# Patient Record
Sex: FEMALE | Race: WHITE | Employment: OTHER | URBAN - METROPOLITAN AREA
[De-identification: names, ages, dates, MRNs, and addresses within clinical notes are randomized per-mention and may not be internally consistent; named-entity substitution may affect disease eponyms.]

---

## 2017-06-21 ENCOUNTER — HOSPITAL ENCOUNTER (OUTPATIENT)
Dept: CT IMAGING | Age: 57
Discharge: HOME | End: 2017-06-21
Attending: FAMILY MEDICINE
Payer: COMMERCIAL

## 2017-06-21 DIAGNOSIS — R22.1 NECK MASS: ICD-10-CM

## 2017-06-21 PROCEDURE — 74011636320 HC RX REV CODE- 636/320: Performed by: FAMILY MEDICINE

## 2017-06-21 PROCEDURE — 70491 CT SOFT TISSUE NECK W/DYE: CPT

## 2017-06-21 RX ADMIN — IOPAMIDOL 83 ML: 612 INJECTION, SOLUTION INTRAVENOUS at 14:08

## 2018-09-05 ENCOUNTER — HOSPITAL ENCOUNTER (EMERGENCY)
Age: 58
Discharge: HOME | End: 2018-09-05
Attending: EMERGENCY MEDICINE
Payer: COMMERCIAL

## 2018-09-05 ENCOUNTER — APPOINTMENT (OUTPATIENT)
Dept: CT IMAGING | Age: 58
End: 2018-09-05
Attending: EMERGENCY MEDICINE
Payer: COMMERCIAL

## 2018-09-05 VITALS
OXYGEN SATURATION: 98 % | BODY MASS INDEX: 42.49 KG/M2 | WEIGHT: 255 LBS | DIASTOLIC BLOOD PRESSURE: 93 MMHG | SYSTOLIC BLOOD PRESSURE: 151 MMHG | RESPIRATION RATE: 18 BRPM | TEMPERATURE: 97.3 F | HEIGHT: 65 IN | HEART RATE: 75 BPM

## 2018-09-05 DIAGNOSIS — R51.9 ACUTE NONINTRACTABLE HEADACHE, UNSPECIFIED HEADACHE TYPE: Primary | ICD-10-CM

## 2018-09-05 LAB
ALBUMIN SERPL-MCNC: 4.4 G/DL (ref 3.4–5)
ALBUMIN/GLOB SERPL: 1.2 {RATIO} (ref 0.8–1.7)
ALP SERPL-CCNC: 135 U/L (ref 45–117)
ALT SERPL-CCNC: 32 U/L (ref 13–56)
ANION GAP SERPL CALC-SCNC: 11 MMOL/L (ref 3–18)
AST SERPL-CCNC: 26 U/L (ref 15–37)
BASOPHILS # BLD: 0 K/UL (ref 0–0.1)
BASOPHILS NFR BLD: 0 % (ref 0–2)
BILIRUB SERPL-MCNC: 0.6 MG/DL (ref 0.2–1)
BUN SERPL-MCNC: 21 MG/DL (ref 7–18)
BUN/CREAT SERPL: 21 (ref 12–20)
CALCIUM SERPL-MCNC: 10 MG/DL (ref 8.5–10.1)
CHLORIDE SERPL-SCNC: 104 MMOL/L (ref 100–108)
CO2 SERPL-SCNC: 26 MMOL/L (ref 21–32)
CREAT SERPL-MCNC: 1.02 MG/DL (ref 0.6–1.3)
DIFFERENTIAL METHOD BLD: ABNORMAL
EOSINOPHIL # BLD: 0.1 K/UL (ref 0–0.4)
EOSINOPHIL NFR BLD: 0 % (ref 0–5)
ERYTHROCYTE [DISTWIDTH] IN BLOOD BY AUTOMATED COUNT: 13.6 % (ref 11.6–14.5)
GLOBULIN SER CALC-MCNC: 3.7 G/DL (ref 2–4)
GLUCOSE SERPL-MCNC: 147 MG/DL (ref 74–99)
HCT VFR BLD AUTO: 47.4 % (ref 35–45)
HGB BLD-MCNC: 16.2 G/DL (ref 12–16)
LYMPHOCYTES # BLD: 5.3 K/UL (ref 0.9–3.6)
LYMPHOCYTES NFR BLD: 31 % (ref 21–52)
MCH RBC QN AUTO: 30.5 PG (ref 24–34)
MCHC RBC AUTO-ENTMCNC: 34.2 G/DL (ref 31–37)
MCV RBC AUTO: 89.3 FL (ref 74–97)
MONOCYTES # BLD: 1 K/UL (ref 0.05–1.2)
MONOCYTES NFR BLD: 6 % (ref 3–10)
NEUTS SEG # BLD: 10.7 K/UL (ref 1.8–8)
NEUTS SEG NFR BLD: 63 % (ref 40–73)
PLATELET # BLD AUTO: 356 K/UL (ref 135–420)
PMV BLD AUTO: 10.2 FL (ref 9.2–11.8)
POTASSIUM SERPL-SCNC: 3.6 MMOL/L (ref 3.5–5.5)
PROT SERPL-MCNC: 8.1 G/DL (ref 6.4–8.2)
RBC # BLD AUTO: 5.31 M/UL (ref 4.2–5.3)
SODIUM SERPL-SCNC: 141 MMOL/L (ref 136–145)
WBC # BLD AUTO: 17.1 K/UL (ref 4.6–13.2)

## 2018-09-05 PROCEDURE — 70450 CT HEAD/BRAIN W/O DYE: CPT

## 2018-09-05 PROCEDURE — 99283 EMERGENCY DEPT VISIT LOW MDM: CPT

## 2018-09-05 PROCEDURE — 96375 TX/PRO/DX INJ NEW DRUG ADDON: CPT

## 2018-09-05 PROCEDURE — 80053 COMPREHEN METABOLIC PANEL: CPT

## 2018-09-05 PROCEDURE — 85025 COMPLETE CBC W/AUTO DIFF WBC: CPT

## 2018-09-05 PROCEDURE — 74011250636 HC RX REV CODE- 250/636: Performed by: EMERGENCY MEDICINE

## 2018-09-05 PROCEDURE — 96374 THER/PROPH/DIAG INJ IV PUSH: CPT

## 2018-09-05 RX ORDER — ESCITALOPRAM OXALATE 20 MG/1
20 TABLET ORAL DAILY
COMMUNITY
End: 2018-09-18

## 2018-09-05 RX ORDER — KETOROLAC TROMETHAMINE 30 MG/ML
30 INJECTION, SOLUTION INTRAMUSCULAR; INTRAVENOUS
Status: COMPLETED | OUTPATIENT
Start: 2018-09-05 | End: 2018-09-05

## 2018-09-05 RX ORDER — PREGABALIN 50 MG/1
50 CAPSULE ORAL 3 TIMES DAILY
COMMUNITY
End: 2018-09-18

## 2018-09-05 RX ORDER — METOCLOPRAMIDE HYDROCHLORIDE 5 MG/ML
10 INJECTION INTRAMUSCULAR; INTRAVENOUS
Status: COMPLETED | OUTPATIENT
Start: 2018-09-05 | End: 2018-09-05

## 2018-09-05 RX ADMIN — METOCLOPRAMIDE 10 MG: 5 INJECTION, SOLUTION INTRAMUSCULAR; INTRAVENOUS at 12:59

## 2018-09-05 RX ADMIN — KETOROLAC TROMETHAMINE 30 MG: 30 INJECTION, SOLUTION INTRAMUSCULAR at 12:58

## 2018-09-05 RX ADMIN — SODIUM CHLORIDE 1000 ML: 900 INJECTION, SOLUTION INTRAVENOUS at 12:58

## 2018-09-05 NOTE — DISCHARGE INSTRUCTIONS

## 2018-09-05 NOTE — ED NOTES
I have reviewed discharge instructions with the patient. The patient verbalized understanding. Patient has no further questions. Patient was ambulatory upon discharge. Patient received no prescription. Patient armband removed and shredded.

## 2018-09-05 NOTE — ED PROVIDER NOTES
EMERGENCY DEPARTMENT HISTORY AND PHYSICAL EXAM 
 
1:07 PM 
 
 
Date: 9/5/2018 Patient Name: Zander West History of Presenting Illness Chief Complaint Patient presents with  
 Headache  Vomiting  Nausea History Provided By: Patient Chief Complaint: Headache Duration:  Hours Timing:  Acute Location: Left parietal 
Quality: Stabbing Severity: Severe Modifying Factors: None Associated Symptoms: N/V Additional History (Context): Zander West is a 60 y.o. female with a h/o HTN and COPD who presents to the ED via EMS complaining of a severe, acute, stabbing left parietal headache that began this morning with associated N/V. The patient explains that her HA began suddenly while sitting on the toilet. She states that she has never had a HA like this before. EMS notes that the patient reported taking her HTN medication. Patient's daughter notes that her mother has a h/o migraines. No exacerbating or alleviating factors noted. No other complaints or concerns at this time. Pt is crying and reluctant to answer questions. PCP: Cj Pagan MD 
 
Current Outpatient Prescriptions Medication Sig Dispense Refill  escitalopram oxalate (LEXAPRO) 20 mg tablet Take 20 mg by mouth daily.  pregabalin (LYRICA) 50 mg capsule Take 50 mg by mouth three (3) times daily.  HYDROcodone-acetaminophen (NORCO) 5-325 mg per tablet Take 1 Tab by mouth every eight (8) hours as needed for Pain. 10 Tab 0  
 fluticasone-salmeterol (ADVAIR DISKUS) 250-50 mcg/dose diskus inhaler Take 1 Puff by inhalation every twelve (12) hours.  omega-3 fatty acids-vitamin e (FISH OIL) 1,000 mg cap Take 1 Cap by mouth.  cyclobenzaprine (FLEXERIL) 10 mg tablet Take  by mouth three (3) times daily as needed.  losartan-hydrochlorothiazide (HYZAAR) 100-25 mg per tablet Take 1 Tab by mouth daily.  ALPRAZolam (XANAX) 0.5 mg tablet Take  by mouth.  ondansetron (ZOFRAN ODT) 8 mg disintegrating tablet Take 1 Tab by mouth every eight (8) hours as needed for Nausea. 12 Tab none Past History Past Medical History: 
Past Medical History:  
Diagnosis Date  Abdominal pain  Anxiety  COPD (chronic obstructive pulmonary disease) (HCC)  DJD (degenerative joint disease) of cervical spine  Elevated cholesterol  GERD (gastroesophageal reflux disease) H/O--NOT ON MEDICATION AT PRESENT  
 Hypertension  Nausea and vomiting Past Surgical History: 
Past Surgical History:  
Procedure Laterality Date  EXCISION TUMOR SOFT TISSUE FOOT/TOE SUBQ <1.5CM  2011  
 2 mccartney neuroma right foot  HX CARPAL TUNNEL RELEASE  2002  HX CHOLECYSTECTOMY  10/14/2013  HX HYSTERECTOMY  1994  
 vaginal (ovaries still in) [address]  CT REMOVAL 1ST/CERVICAL RIB  1984  
 right side 1st rib  REMOVAL OF RIB(S)  1976  
 left side 1st rib Family History: 
Family History Problem Relation Age of Onset  Heart Disease Mother  Hypertension Mother  Diabetes Mother  Hypertension Maternal Aunt  Diabetes Maternal Aunt  Thyroid Disease Sister Social History: 
Social History Substance Use Topics  Smoking status: Former Smoker  Smokeless tobacco: Never Used Comment: stop 4/2013; uses electronic cigarette  Alcohol use No  
 
 
Allergies: Allergies Allergen Reactions  Lisinopril Cough Review of Systems Review of Systems Constitutional: Negative for chills and fever. Respiratory: Negative for shortness of breath. Cardiovascular: Negative for chest pain. Gastrointestinal: Positive for nausea and vomiting. Neurological: Positive for headaches. All other systems reviewed and are negative. Physical Exam  
 
Visit Vitals  BP (!) 151/93  Pulse 75  Temp 97.3 °F (36.3 °C)  Resp 18  Ht 5' 5\" (1.651 m)  Wt 115.7 kg (255 lb)  SpO2 98%  BMI 42.43 kg/m2 Physical Exam  
Constitutional: She is oriented to person, place, and time. She appears well-developed and well-nourished. She appears distressed. HENT:  
Head: Normocephalic and atraumatic. Eyes: Conjunctivae and EOM are normal. Right eye exhibits no discharge. Left eye exhibits no discharge. No scleral icterus. Neck: Normal range of motion. Neck supple. No tracheal deviation present. Cardiovascular: Normal rate, regular rhythm and normal heart sounds. No murmur heard. Pulmonary/Chest: Effort normal and breath sounds normal. No respiratory distress. She has no wheezes. She has no rales. Abdominal: Soft. She exhibits no distension. There is no tenderness. There is no rebound and no guarding. Musculoskeletal: Normal range of motion. She exhibits no edema or deformity. Neurological: She is alert and oriented to person, place, and time. No cranial nerve deficit. Skin: Skin is warm and dry. She is not diaphoretic. Psychiatric: Judgment and thought content normal.  
 
 
 
Diagnostic Study Results Labs - Recent Results (from the past 12 hour(s)) CBC WITH AUTOMATED DIFF Collection Time: 09/05/18 12:55 PM  
Result Value Ref Range WBC 17.1 (H) 4.6 - 13.2 K/uL  
 RBC 5.31 (H) 4.20 - 5.30 M/uL  
 HGB 16.2 (H) 12.0 - 16.0 g/dL HCT 47.4 (H) 35.0 - 45.0 % MCV 89.3 74.0 - 97.0 FL  
 MCH 30.5 24.0 - 34.0 PG  
 MCHC 34.2 31.0 - 37.0 g/dL  
 RDW 13.6 11.6 - 14.5 % PLATELET 398 524 - 925 K/uL MPV 10.2 9.2 - 11.8 FL  
 NEUTROPHILS 63 40 - 73 % LYMPHOCYTES 31 21 - 52 % MONOCYTES 6 3 - 10 % EOSINOPHILS 0 0 - 5 % BASOPHILS 0 0 - 2 %  
 ABS. NEUTROPHILS 10.7 (H) 1.8 - 8.0 K/UL  
 ABS. LYMPHOCYTES 5.3 (H) 0.9 - 3.6 K/UL  
 ABS. MONOCYTES 1.0 0.05 - 1.2 K/UL  
 ABS. EOSINOPHILS 0.1 0.0 - 0.4 K/UL  
 ABS. BASOPHILS 0.0 0.0 - 0.1 K/UL  
 DF AUTOMATED METABOLIC PANEL, COMPREHENSIVE Collection Time: 09/05/18 12:55 PM  
Result Value Ref Range Sodium 141 136 - 145 mmol/L Potassium 3.6 3.5 - 5.5 mmol/L Chloride 104 100 - 108 mmol/L  
 CO2 26 21 - 32 mmol/L Anion gap 11 3.0 - 18 mmol/L Glucose 147 (H) 74 - 99 mg/dL BUN 21 (H) 7.0 - 18 MG/DL Creatinine 1.02 0.6 - 1.3 MG/DL  
 BUN/Creatinine ratio 21 (H) 12 - 20 GFR est AA >60 >60 ml/min/1.73m2 GFR est non-AA 55 (L) >60 ml/min/1.73m2 Calcium 10.0 8.5 - 10.1 MG/DL Bilirubin, total 0.6 0.2 - 1.0 MG/DL  
 ALT (SGPT) 32 13 - 56 U/L  
 AST (SGOT) 26 15 - 37 U/L Alk. phosphatase 135 (H) 45 - 117 U/L Protein, total 8.1 6.4 - 8.2 g/dL Albumin 4.4 3.4 - 5.0 g/dL Globulin 3.7 2.0 - 4.0 g/dL A-G Ratio 1.2 0.8 - 1.7 Radiologic Studies -  
CT HEAD WO CONT Final Result IMPRESSION: 
 
No acute intracranial abnormalities. Medical Decision Making I am the first provider for this patient. I reviewed the vital signs, available nursing notes, past medical history, past surgical history, family history and social history. Vital Signs-Reviewed the patient's vital signs. Pulse Oximetry Analysis -  98% on room air (Interpretation) WNL Records Reviewed: Nursing Notes and Old Medical Records (Time of Review: 1:07 PM) ED Course: Progress Notes, Reevaluation, and Consults: 
 
Provider Notes (Medical Decision Making): Patient with acute HA and elevated WBC, which is chronic for her. She has seen a hematologist and had a complete workup for leukocytosis which revealed no pathology. HA is improved after medication and no acute findings on labs or imaging. [facility] She will be d/c home with PCP follow up. Diagnosis Clinical Impression: 1. Acute nonintractable headache, unspecified headache type Disposition: Discharged Follow-up Information Follow up With Details Comments Contact Info Rock Washington MD Schedule an appointment as soon as possible for a visit  [address] [place] [id number] [phone] [facility] EMERGENCY DEPT  If symptoms worsen [address] [facility]. 
[phone] Discharge Medication List as of 9/5/2018  2:51 PM  
  
CONTINUE these medications which have NOT CHANGED Details  
escitalopram oxalate (LEXAPRO) 20 mg tablet Take 20 mg by mouth daily. , Historical Med  
  
pregabalin (LYRICA) 50 mg capsule Take 50 mg by mouth three (3) times daily. , Historical Med HYDROcodone-acetaminophen (NORCO) 5-325 mg per tablet Take 1 Tab by mouth every eight (8) hours as needed for Pain. Print, 1 Tab, Disp-10 Tab, R-0  
  
fluticasone-salmeterol (ADVAIR DISKUS) 250-50 mcg/dose diskus inhaler Not for PRN use  Not for use as a rescue inhaler. Take 1 Puff by inhalation every twelve (12) hours. Historical Med, 1 Puff  
  
omega-3 fatty acids-vitamin e (FISH OIL) 1,000 mg cap Take 1 Cap by mouth. Historical Med, 1 Cap  
  
cyclobenzaprine (FLEXERIL) 10 mg tablet Take  by mouth three (3) times daily as needed. Historical Med  
  
losartan-hydrochlorothiazide (HYZAAR) 100-25 mg per tablet Take 1 Tab by mouth daily. Historical Med, 1 Tab ALPRAZolam (XANAX) 0.5 mg tablet Take  by mouth. Historical Med  
  
ondansetron (ZOFRAN ODT) 8 mg disintegrating tablet Take 1 Tab by mouth every eight (8) hours as needed for Nausea. Print, 8 mg, Disp-12 Tab, R-none  
  
  
 
_______________________________ Attestations: 
Scribe Attestation Odella Schwab acting as a scribe for and in the presence of Brinda Almaguer MD     
September 05, 2018 at 1:07 PM 
    
Provider Attestation:     
I personally performed the services described in the documentation, reviewed the documentation, as recorded by the scribe in my presence, and it accurately and completely records my words and actions. September 05, 2018 at 1:07 PM - Brinda Almaguer MD   
_______________________________

## 2018-09-05 NOTE — ED TRIAGE NOTES
Patient states she had this sudden onset of the headache today, arrived to the ED via EMS, patient was dry heaving, did vomit some emesis. Per EMS the patient did take her BP meds today.

## 2018-09-05 NOTE — ED NOTES
Per the daughters, the patient does have a Hx of Migraines but to their knowledge she does not take any specific medication for the migraines.

## 2018-09-05 NOTE — ED TRIAGE NOTES
Patient moaning very loudly, \"please help me, my head hurts\", nurse in the room with the patient obtaining an IV

## 2018-09-09 ENCOUNTER — APPOINTMENT (OUTPATIENT)
Dept: CT IMAGING | Age: 58
DRG: 064 | End: 2018-09-09
Attending: EMERGENCY MEDICINE
Payer: COMMERCIAL

## 2018-09-09 ENCOUNTER — APPOINTMENT (OUTPATIENT)
Dept: GENERAL RADIOLOGY | Age: 58
DRG: 064 | End: 2018-09-09
Attending: EMERGENCY MEDICINE
Payer: COMMERCIAL

## 2018-09-09 ENCOUNTER — APPOINTMENT (OUTPATIENT)
Dept: GENERAL RADIOLOGY | Age: 58
DRG: 064 | End: 2018-09-09
Attending: INTERNAL MEDICINE
Payer: COMMERCIAL

## 2018-09-09 ENCOUNTER — APPOINTMENT (OUTPATIENT)
Dept: MRI IMAGING | Age: 58
DRG: 064 | End: 2018-09-09
Attending: HOSPITALIST
Payer: COMMERCIAL

## 2018-09-09 ENCOUNTER — APPOINTMENT (OUTPATIENT)
Dept: MRI IMAGING | Age: 58
DRG: 064 | End: 2018-09-09
Attending: PSYCHIATRY & NEUROLOGY
Payer: COMMERCIAL

## 2018-09-09 ENCOUNTER — HOSPITAL ENCOUNTER (INPATIENT)
Age: 58
LOS: 9 days | Discharge: NURSING, REHAB OR LONG-TERM CARE | DRG: 064 | End: 2018-09-18
Attending: EMERGENCY MEDICINE | Admitting: HOSPITALIST
Payer: COMMERCIAL

## 2018-09-09 DIAGNOSIS — I67.83 PRES (POSTERIOR REVERSIBLE ENCEPHALOPATHY SYNDROME): ICD-10-CM

## 2018-09-09 DIAGNOSIS — G40.901 STATUS EPILEPTICUS (HCC): ICD-10-CM

## 2018-09-09 DIAGNOSIS — R41.82 ALTERED MENTAL STATUS, UNSPECIFIED ALTERED MENTAL STATUS TYPE: Primary | ICD-10-CM

## 2018-09-09 PROBLEM — R82.5 POSITIVE URINE DRUG SCREEN: Status: ACTIVE | Noted: 2018-09-09

## 2018-09-09 PROBLEM — D72.820 LYMPHOCYTOSIS: Status: ACTIVE | Noted: 2018-09-09

## 2018-09-09 PROBLEM — N17.9 ACUTE KIDNEY FAILURE (HCC): Status: ACTIVE | Noted: 2018-09-09

## 2018-09-09 PROBLEM — D75.1 ERYTHROCYTOSIS: Status: ACTIVE | Noted: 2018-09-09

## 2018-09-09 PROBLEM — R56.9 POSTICTAL STATE (HCC): Status: ACTIVE | Noted: 2018-09-09

## 2018-09-09 PROBLEM — F41.9 ANXIETY: Status: ACTIVE | Noted: 2018-09-09

## 2018-09-09 PROBLEM — K21.9 GERD (GASTROESOPHAGEAL REFLUX DISEASE): Status: ACTIVE | Noted: 2018-09-09

## 2018-09-09 PROBLEM — R00.0 TACHYCARDIA: Status: ACTIVE | Noted: 2018-09-09

## 2018-09-09 LAB
ALBUMIN SERPL-MCNC: 4.4 G/DL (ref 3.4–5)
ALBUMIN/GLOB SERPL: 1.2 {RATIO} (ref 0.8–1.7)
ALP SERPL-CCNC: 131 U/L (ref 45–117)
ALT SERPL-CCNC: 33 U/L (ref 13–56)
AMPHET UR QL SCN: NEGATIVE
ANION GAP SERPL CALC-SCNC: 14 MMOL/L (ref 3–18)
APPEARANCE CSF: CLEAR
APPEARANCE CSF: CLEAR
APPEARANCE UR: CLEAR
APTT PPP: 27.7 SEC (ref 23–36.4)
AST SERPL-CCNC: 24 U/L (ref 15–37)
BARBITURATES UR QL SCN: NEGATIVE
BASOPHILS # BLD: 0 K/UL (ref 0–0.1)
BASOPHILS NFR BLD: 0 % (ref 0–3)
BENZODIAZ UR QL: POSITIVE
BILIRUB SERPL-MCNC: 0.7 MG/DL (ref 0.2–1)
BILIRUB UR QL: NEGATIVE
BUN SERPL-MCNC: 19 MG/DL (ref 7–18)
BUN/CREAT SERPL: 13 (ref 12–20)
CALCIUM SERPL-MCNC: 10.3 MG/DL (ref 8.5–10.1)
CANNABINOIDS UR QL SCN: POSITIVE
CHLORIDE SERPL-SCNC: 104 MMOL/L (ref 100–108)
CK SERPL-CCNC: 68 U/L (ref 26–192)
CO2 SERPL-SCNC: 21 MMOL/L (ref 21–32)
COCAINE UR QL SCN: NEGATIVE
COLOR CSF: COLORLESS
COLOR CSF: COLORLESS
COLOR SPUN CSF: COLORLESS
COLOR SPUN CSF: COLORLESS
COLOR UR: YELLOW
CREAT SERPL-MCNC: 1.49 MG/DL (ref 0.6–1.3)
CRYPTOC AG CSF QL LA: NEGATIVE
DIFFERENTIAL METHOD BLD: ABNORMAL
EOSINOPHIL # BLD: 0.7 K/UL (ref 0–0.4)
EOSINOPHIL NFR BLD: 3 % (ref 0–5)
ERYTHROCYTE [DISTWIDTH] IN BLOOD BY AUTOMATED COUNT: 13.1 % (ref 11.6–14.5)
ETHANOL SERPL-MCNC: <3 MG/DL (ref 0–3)
GLOBULIN SER CALC-MCNC: 3.8 G/DL (ref 2–4)
GLUCOSE CSF-MCNC: 71 MG/DL (ref 40–70)
GLUCOSE SERPL-MCNC: 162 MG/DL (ref 74–99)
GLUCOSE UR STRIP.AUTO-MCNC: NEGATIVE MG/DL
HCT VFR BLD AUTO: 49.9 % (ref 35–45)
HDSCOM,HDSCOM: ABNORMAL
HGB BLD-MCNC: 17 G/DL (ref 12–16)
HGB UR QL STRIP: NEGATIVE
INR PPP: 0.9 (ref 0.8–1.2)
KETONES UR QL STRIP.AUTO: NEGATIVE MG/DL
LEUKOCYTE ESTERASE UR QL STRIP.AUTO: NEGATIVE
LYMPHOCYTES # BLD: 14.2 K/UL (ref 0.8–3.5)
LYMPHOCYTES NFR BLD: 59 % (ref 20–51)
MAGNESIUM SERPL-MCNC: 2.1 MG/DL (ref 1.6–2.6)
MCH RBC QN AUTO: 30.6 PG (ref 24–34)
MCHC RBC AUTO-ENTMCNC: 34.1 G/DL (ref 31–37)
MCV RBC AUTO: 89.9 FL (ref 74–97)
METHADONE UR QL: NEGATIVE
MONOCYTES # BLD: 2.2 K/UL (ref 0–1)
MONOCYTES NFR BLD: 9 % (ref 2–9)
MYELOCYTES NFR BLD MANUAL: 1 %
NEUTS SEG # BLD: 6.7 K/UL (ref 1.8–8)
NEUTS SEG NFR BLD: 28 % (ref 42–75)
NITRITE UR QL STRIP.AUTO: NEGATIVE
OPIATES UR QL: NEGATIVE
PCP UR QL: NEGATIVE
PH UR STRIP: 5 [PH] (ref 5–8)
PLATELET # BLD AUTO: 389 K/UL (ref 135–420)
PMV BLD AUTO: 10.6 FL (ref 9.2–11.8)
POTASSIUM SERPL-SCNC: 5 MMOL/L (ref 3.5–5.5)
PROT CSF-MCNC: 40 MG/DL (ref 15–45)
PROT SERPL-MCNC: 8.2 G/DL (ref 6.4–8.2)
PROT UR STRIP-MCNC: NEGATIVE MG/DL
PROTHROMBIN TIME: 12.1 SEC (ref 11.5–15.2)
RBC # BLD AUTO: 5.55 M/UL (ref 4.2–5.3)
RBC # CSF: 111 /CU MM
RBC # CSF: 21 /CU MM
RBC MORPH BLD: ABNORMAL
SODIUM SERPL-SCNC: 139 MMOL/L (ref 136–145)
SP GR UR REFRACTOMETRY: 1.01 (ref 1–1.03)
T4 FREE SERPL-MCNC: 1.3 NG/DL (ref 0.7–1.5)
TROPONIN I SERPL-MCNC: <0.02 NG/ML (ref 0–0.04)
TSH SERPL DL<=0.05 MIU/L-ACNC: 4.9 UIU/ML (ref 0.36–3.74)
TUBE # CSF: 3
TUBE # CSF: 4
UROBILINOGEN UR QL STRIP.AUTO: 0.2 EU/DL (ref 0.2–1)
VALPROATE SERPL-MCNC: 64 UG/ML (ref 50–100)
WBC # BLD AUTO: 24.1 K/UL (ref 4.6–13.2)
WBC # CSF: 0 /CU MM
WBC # CSF: 0 /CU MM

## 2018-09-09 PROCEDURE — 80164 ASSAY DIPROPYLACETIC ACD TOT: CPT | Performed by: PSYCHIATRY & NEUROLOGY

## 2018-09-09 PROCEDURE — 74011250636 HC RX REV CODE- 250/636

## 2018-09-09 PROCEDURE — 88108 CYTOPATH CONCENTRATE TECH: CPT | Performed by: HOSPITALIST

## 2018-09-09 PROCEDURE — 80307 DRUG TEST PRSMV CHEM ANLYZR: CPT | Performed by: EMERGENCY MEDICINE

## 2018-09-09 PROCEDURE — 87327 CRYPTOCOCCUS NEOFORM AG IA: CPT | Performed by: PSYCHIATRY & NEUROLOGY

## 2018-09-09 PROCEDURE — 84443 ASSAY THYROID STIM HORMONE: CPT | Performed by: EMERGENCY MEDICINE

## 2018-09-09 PROCEDURE — 99285 EMERGENCY DEPT VISIT HI MDM: CPT

## 2018-09-09 PROCEDURE — 80053 COMPREHEN METABOLIC PANEL: CPT | Performed by: EMERGENCY MEDICINE

## 2018-09-09 PROCEDURE — 74011000250 HC RX REV CODE- 250: Performed by: INTERNAL MEDICINE

## 2018-09-09 PROCEDURE — 74011250636 HC RX REV CODE- 250/636: Performed by: INTERNAL MEDICINE

## 2018-09-09 PROCEDURE — A9575 INJ GADOTERATE MEGLUMI 0.1ML: HCPCS | Performed by: HOSPITALIST

## 2018-09-09 PROCEDURE — 87529 HSV DNA AMP PROBE: CPT | Performed by: PSYCHIATRY & NEUROLOGY

## 2018-09-09 PROCEDURE — 85025 COMPLETE CBC W/AUTO DIFF WBC: CPT | Performed by: EMERGENCY MEDICINE

## 2018-09-09 PROCEDURE — 74011000258 HC RX REV CODE- 258: Performed by: HOSPITALIST

## 2018-09-09 PROCEDURE — 85730 THROMBOPLASTIN TIME PARTIAL: CPT | Performed by: EMERGENCY MEDICINE

## 2018-09-09 PROCEDURE — 009U3ZX DRAINAGE OF SPINAL CANAL, PERCUTANEOUS APPROACH, DIAGNOSTIC: ICD-10-PCS | Performed by: HOSPITALIST

## 2018-09-09 PROCEDURE — 77030037878 HC DRSG MEPILEX >48IN BORD MOLN -B

## 2018-09-09 PROCEDURE — 82945 GLUCOSE OTHER FLUID: CPT | Performed by: PSYCHIATRY & NEUROLOGY

## 2018-09-09 PROCEDURE — 85610 PROTHROMBIN TIME: CPT | Performed by: EMERGENCY MEDICINE

## 2018-09-09 PROCEDURE — 84157 ASSAY OF PROTEIN OTHER: CPT | Performed by: PSYCHIATRY & NEUROLOGY

## 2018-09-09 PROCEDURE — 96374 THER/PROPH/DIAG INJ IV PUSH: CPT

## 2018-09-09 PROCEDURE — 74011250636 HC RX REV CODE- 250/636: Performed by: HOSPITALIST

## 2018-09-09 PROCEDURE — 87070 CULTURE OTHR SPECIMN AEROBIC: CPT | Performed by: PSYCHIATRY & NEUROLOGY

## 2018-09-09 PROCEDURE — 87116 MYCOBACTERIA CULTURE: CPT | Performed by: PSYCHIATRY & NEUROLOGY

## 2018-09-09 PROCEDURE — 86255 FLUORESCENT ANTIBODY SCREEN: CPT | Performed by: INTERNAL MEDICINE

## 2018-09-09 PROCEDURE — 88185 FLOWCYTOMETRY/TC ADD-ON: CPT | Performed by: HOSPITALIST

## 2018-09-09 PROCEDURE — 74011000250 HC RX REV CODE- 250: Performed by: PSYCHIATRY & NEUROLOGY

## 2018-09-09 PROCEDURE — 74011000258 HC RX REV CODE- 258: Performed by: PSYCHIATRY & NEUROLOGY

## 2018-09-09 PROCEDURE — 80184 ASSAY OF PHENOBARBITAL: CPT | Performed by: INTERNAL MEDICINE

## 2018-09-09 PROCEDURE — 94664 DEMO&/EVAL PT USE INHALER: CPT

## 2018-09-09 PROCEDURE — 81003 URINALYSIS AUTO W/O SCOPE: CPT | Performed by: EMERGENCY MEDICINE

## 2018-09-09 PROCEDURE — 89050 BODY FLUID CELL COUNT: CPT | Performed by: PSYCHIATRY & NEUROLOGY

## 2018-09-09 PROCEDURE — 81001 URINALYSIS AUTO W/SCOPE: CPT | Performed by: HOSPITALIST

## 2018-09-09 PROCEDURE — 65610000009 HC RM ICU NEURO

## 2018-09-09 PROCEDURE — 87102 FUNGUS ISOLATION CULTURE: CPT | Performed by: PSYCHIATRY & NEUROLOGY

## 2018-09-09 PROCEDURE — 70450 CT HEAD/BRAIN W/O DYE: CPT

## 2018-09-09 PROCEDURE — 84439 ASSAY OF FREE THYROXINE: CPT | Performed by: EMERGENCY MEDICINE

## 2018-09-09 PROCEDURE — 71045 X-RAY EXAM CHEST 1 VIEW: CPT

## 2018-09-09 PROCEDURE — 82550 ASSAY OF CK (CPK): CPT | Performed by: EMERGENCY MEDICINE

## 2018-09-09 PROCEDURE — 74011250636 HC RX REV CODE- 250/636: Performed by: EMERGENCY MEDICINE

## 2018-09-09 PROCEDURE — 74011250636 HC RX REV CODE- 250/636: Performed by: PSYCHIATRY & NEUROLOGY

## 2018-09-09 PROCEDURE — 96361 HYDRATE IV INFUSION ADD-ON: CPT

## 2018-09-09 PROCEDURE — 36415 COLL VENOUS BLD VENIPUNCTURE: CPT | Performed by: INTERNAL MEDICINE

## 2018-09-09 PROCEDURE — 96376 TX/PRO/DX INJ SAME DRUG ADON: CPT

## 2018-09-09 PROCEDURE — 74011000250 HC RX REV CODE- 250: Performed by: HOSPITALIST

## 2018-09-09 PROCEDURE — 74018 RADEX ABDOMEN 1 VIEW: CPT

## 2018-09-09 PROCEDURE — 96375 TX/PRO/DX INJ NEW DRUG ADDON: CPT

## 2018-09-09 PROCEDURE — 88184 FLOWCYTOMETRY/ TC 1 MARKER: CPT | Performed by: HOSPITALIST

## 2018-09-09 PROCEDURE — 94640 AIRWAY INHALATION TREATMENT: CPT

## 2018-09-09 PROCEDURE — 83735 ASSAY OF MAGNESIUM: CPT | Performed by: EMERGENCY MEDICINE

## 2018-09-09 PROCEDURE — 0DH673Z INSERTION OF INFUSION DEVICE INTO STOMACH, VIA NATURAL OR ARTIFICIAL OPENING: ICD-10-PCS | Performed by: HOSPITALIST

## 2018-09-09 PROCEDURE — 70544 MR ANGIOGRAPHY HEAD W/O DYE: CPT

## 2018-09-09 PROCEDURE — 93005 ELECTROCARDIOGRAM TRACING: CPT

## 2018-09-09 PROCEDURE — 84484 ASSAY OF TROPONIN QUANT: CPT | Performed by: EMERGENCY MEDICINE

## 2018-09-09 PROCEDURE — 70553 MRI BRAIN STEM W/O & W/DYE: CPT

## 2018-09-09 PROCEDURE — 77010033678 HC OXYGEN DAILY

## 2018-09-09 RX ORDER — LABETALOL HCL 20 MG/4 ML
20 SYRINGE (ML) INTRAVENOUS
Status: DISCONTINUED | OUTPATIENT
Start: 2018-09-09 | End: 2018-09-18 | Stop reason: HOSPADM

## 2018-09-09 RX ORDER — LEVETIRACETAM 10 MG/ML
INJECTION INTRAVASCULAR
Status: COMPLETED
Start: 2018-09-09 | End: 2018-09-09

## 2018-09-09 RX ORDER — FOSPHENYTOIN SODIUM 50 MG/ML
15 INJECTION, SOLUTION INTRAMUSCULAR; INTRAVENOUS ONCE
Status: DISCONTINUED | OUTPATIENT
Start: 2018-09-09 | End: 2018-09-09 | Stop reason: DRUGHIGH

## 2018-09-09 RX ORDER — PREGABALIN 50 MG/1
50 CAPSULE ORAL 3 TIMES DAILY
Status: CANCELLED | OUTPATIENT
Start: 2018-09-09

## 2018-09-09 RX ORDER — GADOTERATE MEGLUMINE 376.9 MG/ML
20 INJECTION INTRAVENOUS
Status: COMPLETED | OUTPATIENT
Start: 2018-09-09 | End: 2018-09-09

## 2018-09-09 RX ORDER — ALPRAZOLAM 0.5 MG/1
0.5 TABLET ORAL
Status: CANCELLED | OUTPATIENT
Start: 2018-09-09

## 2018-09-09 RX ORDER — LORAZEPAM 2 MG/ML
2 INJECTION INTRAMUSCULAR
Status: COMPLETED | OUTPATIENT
Start: 2018-09-09 | End: 2018-09-09

## 2018-09-09 RX ORDER — PROPOFOL 10 MG/ML
0-50 VIAL (ML) INTRAVENOUS
Status: DISCONTINUED | OUTPATIENT
Start: 2018-09-09 | End: 2018-09-09

## 2018-09-09 RX ORDER — ROCURONIUM BROMIDE 10 MG/ML
100 INJECTION, SOLUTION INTRAVENOUS
Status: DISCONTINUED | OUTPATIENT
Start: 2018-09-09 | End: 2018-09-09

## 2018-09-09 RX ORDER — SODIUM CHLORIDE 0.9 % (FLUSH) 0.9 %
5-10 SYRINGE (ML) INJECTION AS NEEDED
Status: DISCONTINUED | OUTPATIENT
Start: 2018-09-09 | End: 2018-09-18 | Stop reason: HOSPADM

## 2018-09-09 RX ORDER — HEPARIN SODIUM 5000 [USP'U]/ML
5000 INJECTION, SOLUTION INTRAVENOUS; SUBCUTANEOUS EVERY 8 HOURS
Status: DISCONTINUED | OUTPATIENT
Start: 2018-09-09 | End: 2018-09-09

## 2018-09-09 RX ORDER — ETOMIDATE 2 MG/ML
15 INJECTION INTRAVENOUS
Status: DISCONTINUED | OUTPATIENT
Start: 2018-09-09 | End: 2018-09-09

## 2018-09-09 RX ORDER — SODIUM CHLORIDE 0.9 % (FLUSH) 0.9 %
5-10 SYRINGE (ML) INJECTION EVERY 8 HOURS
Status: DISCONTINUED | OUTPATIENT
Start: 2018-09-09 | End: 2018-09-14

## 2018-09-09 RX ORDER — PHENOBARBITAL SODIUM 130 MG/ML
1500 INJECTION INTRAMUSCULAR ONCE
Status: DISCONTINUED | OUTPATIENT
Start: 2018-09-09 | End: 2018-09-09

## 2018-09-09 RX ORDER — LORAZEPAM 2 MG/ML
INJECTION INTRAMUSCULAR
Status: COMPLETED
Start: 2018-09-09 | End: 2018-09-09

## 2018-09-09 RX ORDER — LORAZEPAM 2 MG/ML
1 INJECTION INTRAMUSCULAR AS NEEDED
Status: DISCONTINUED | OUTPATIENT
Start: 2018-09-09 | End: 2018-09-11

## 2018-09-09 RX ORDER — SODIUM CHLORIDE 0.9 % (FLUSH) 0.9 %
5-10 SYRINGE (ML) INJECTION EVERY 8 HOURS
Status: DISCONTINUED | OUTPATIENT
Start: 2018-09-09 | End: 2018-09-18 | Stop reason: HOSPADM

## 2018-09-09 RX ORDER — ROCURONIUM BROMIDE 10 MG/ML
INJECTION, SOLUTION INTRAVENOUS
Status: DISPENSED
Start: 2018-09-09 | End: 2018-09-09

## 2018-09-09 RX ORDER — FLUTICASONE PROPIONATE AND SALMETEROL 250; 50 UG/1; UG/1
1 POWDER RESPIRATORY (INHALATION) EVERY 12 HOURS
Status: CANCELLED | OUTPATIENT
Start: 2018-09-09

## 2018-09-09 RX ORDER — ACETAMINOPHEN 650 MG/1
650 SUPPOSITORY RECTAL
Status: DISCONTINUED | OUTPATIENT
Start: 2018-09-09 | End: 2018-09-18 | Stop reason: HOSPADM

## 2018-09-09 RX ORDER — HEPARIN SODIUM 5000 [USP'U]/ML
5000 INJECTION, SOLUTION INTRAVENOUS; SUBCUTANEOUS EVERY 8 HOURS
Status: CANCELLED | OUTPATIENT
Start: 2018-09-09

## 2018-09-09 RX ORDER — PHENOBARBITAL SODIUM 130 MG/ML
130 INJECTION INTRAMUSCULAR EVERY EVENING
Status: DISCONTINUED | OUTPATIENT
Start: 2018-09-10 | End: 2018-09-10

## 2018-09-09 RX ORDER — BUDESONIDE 0.5 MG/2ML
500 INHALANT ORAL
Status: DISCONTINUED | OUTPATIENT
Start: 2018-09-09 | End: 2018-09-18 | Stop reason: HOSPADM

## 2018-09-09 RX ORDER — LORAZEPAM 2 MG/ML
2 INJECTION INTRAMUSCULAR ONCE
Status: COMPLETED | OUTPATIENT
Start: 2018-09-09 | End: 2018-09-09

## 2018-09-09 RX ORDER — ACETAMINOPHEN 325 MG/1
650 TABLET ORAL
Status: CANCELLED | OUTPATIENT
Start: 2018-09-09

## 2018-09-09 RX ORDER — ESCITALOPRAM OXALATE 20 MG/1
20 TABLET ORAL DAILY
Status: CANCELLED | OUTPATIENT
Start: 2018-09-09

## 2018-09-09 RX ORDER — DEXTROSE MONOHYDRATE AND SODIUM CHLORIDE 5; .9 G/100ML; G/100ML
75 INJECTION, SOLUTION INTRAVENOUS CONTINUOUS
Status: CANCELLED | OUTPATIENT
Start: 2018-09-09

## 2018-09-09 RX ORDER — DEXTROSE MONOHYDRATE AND SODIUM CHLORIDE 5; .9 G/100ML; G/100ML
100 INJECTION, SOLUTION INTRAVENOUS CONTINUOUS
Status: DISCONTINUED | OUTPATIENT
Start: 2018-09-09 | End: 2018-09-12

## 2018-09-09 RX ORDER — ARFORMOTEROL TARTRATE 15 UG/2ML
15 SOLUTION RESPIRATORY (INHALATION)
Status: DISCONTINUED | OUTPATIENT
Start: 2018-09-09 | End: 2018-09-18 | Stop reason: HOSPADM

## 2018-09-09 RX ORDER — DIPHENHYDRAMINE HYDROCHLORIDE 50 MG/ML
50 INJECTION, SOLUTION INTRAMUSCULAR; INTRAVENOUS
Status: COMPLETED | OUTPATIENT
Start: 2018-09-09 | End: 2018-09-09

## 2018-09-09 RX ADMIN — Medication 10 ML: at 22:29

## 2018-09-09 RX ADMIN — DEXTROSE MONOHYDRATE AND SODIUM CHLORIDE 100 ML/HR: 5; .9 INJECTION, SOLUTION INTRAVENOUS at 08:54

## 2018-09-09 RX ADMIN — Medication 10 ML: at 14:31

## 2018-09-09 RX ADMIN — FOSPHENYTOIN SODIUM 1650 MG PE: 50 INJECTION, SOLUTION INTRAMUSCULAR; INTRAVENOUS at 11:44

## 2018-09-09 RX ADMIN — SODIUM CHLORIDE 750 MG: 900 INJECTION, SOLUTION INTRAVENOUS at 20:53

## 2018-09-09 RX ADMIN — SODIUM CHLORIDE 750 MG: 900 INJECTION, SOLUTION INTRAVENOUS at 14:31

## 2018-09-09 RX ADMIN — Medication 10 ML: at 10:12

## 2018-09-09 RX ADMIN — ACYCLOVIR SODIUM 570 MG: 500 INJECTION, POWDER, LYOPHILIZED, FOR SOLUTION INTRAVENOUS at 14:31

## 2018-09-09 RX ADMIN — ACYCLOVIR SODIUM 570 MG: 500 INJECTION, POWDER, LYOPHILIZED, FOR SOLUTION INTRAVENOUS at 22:29

## 2018-09-09 RX ADMIN — SODIUM CHLORIDE 1000 ML: 900 INJECTION, SOLUTION INTRAVENOUS at 05:25

## 2018-09-09 RX ADMIN — LABETALOL 20 MG/4 ML (5 MG/ML) INTRAVENOUS SYRINGE 20 MG: at 22:29

## 2018-09-09 RX ADMIN — LORAZEPAM 2 MG: 2 INJECTION, SOLUTION INTRAMUSCULAR; INTRAVENOUS at 07:16

## 2018-09-09 RX ADMIN — LORAZEPAM 2 MG: 2 INJECTION, SOLUTION INTRAMUSCULAR; INTRAVENOUS at 05:09

## 2018-09-09 RX ADMIN — LORAZEPAM 2 MG: 2 INJECTION, SOLUTION INTRAMUSCULAR; INTRAVENOUS at 05:00

## 2018-09-09 RX ADMIN — LORAZEPAM 2 MG: 2 INJECTION INTRAMUSCULAR at 05:09

## 2018-09-09 RX ADMIN — BUDESONIDE 500 MCG: 0.5 INHALANT RESPIRATORY (INHALATION) at 19:41

## 2018-09-09 RX ADMIN — PHENOBARBITAL SODIUM 1500 MG: 130 INJECTION INTRAMUSCULAR; INTRAVENOUS at 10:04

## 2018-09-09 RX ADMIN — SODIUM CHLORIDE 1000 ML: 900 INJECTION, SOLUTION INTRAVENOUS at 05:30

## 2018-09-09 RX ADMIN — DEXTROSE MONOHYDRATE AND SODIUM CHLORIDE 100 ML/HR: 5; .9 INJECTION, SOLUTION INTRAVENOUS at 08:57

## 2018-09-09 RX ADMIN — DIPHENHYDRAMINE HYDROCHLORIDE 50 MG: 50 INJECTION, SOLUTION INTRAMUSCULAR; INTRAVENOUS at 05:10

## 2018-09-09 RX ADMIN — LORAZEPAM 2 MG: 2 INJECTION, SOLUTION INTRAMUSCULAR; INTRAVENOUS at 07:55

## 2018-09-09 RX ADMIN — ACYCLOVIR SODIUM 570 MG: 500 INJECTION, POWDER, LYOPHILIZED, FOR SOLUTION INTRAVENOUS at 10:03

## 2018-09-09 RX ADMIN — GADOTERATE MEGLUMINE 20 ML: 376.9 INJECTION INTRAVENOUS at 13:15

## 2018-09-09 RX ADMIN — SODIUM CHLORIDE 1000 ML: 900 INJECTION, SOLUTION INTRAVENOUS at 05:10

## 2018-09-09 RX ADMIN — ARFORMOTEROL TARTRATE 15 MCG: 15 SOLUTION RESPIRATORY (INHALATION) at 19:41

## 2018-09-09 RX ADMIN — LEVETIRACETAM 2000 MG: 10 INJECTION INTRAVENOUS at 08:32

## 2018-09-09 RX ADMIN — SODIUM CHLORIDE 1500 MG: 900 INJECTION, SOLUTION INTRAVENOUS at 08:58

## 2018-09-09 NOTE — ED NOTES
MRI contacted, per the provider hold off on MRI until the patient stabilizes and is no longer seizing

## 2018-09-09 NOTE — IP AVS SNAPSHOT
[address] Patient: Don Hernández MRN: [id number] A check maría indicates which time of day the medication should be taken. My Medications START taking these medications Instructions Each Dose to Equal  
 Morning Noon Evening Bedtime  
 amLODIPine 10 mg tablet Commonly known as:  Anjelica Colon Your last dose was: Your next dose is: Take 1 Tab by mouth daily. 10 mg  
    
   
   
   
  
 divalproex  mg tablet Commonly known as:  DEPAKOTE Your last dose was: Your next dose is: Take 3 Tabs by mouth three (3) times daily. 750 mg  
    
   
   
   
  
 hydroCHLOROthiazide 50 mg tablet Commonly known as:  HYDRODIURIL Your last dose was: Your next dose is: Take 1 Tab by mouth daily. 50 mg  
    
   
   
   
  
 lacosamide 100 mg Tab tablet Commonly known as:  VIMPAT Your last dose was: Your next dose is: Take 1 Tab by mouth two (2) times a day. Max Daily Amount: 200 mg.  
 100 mg  
    
   
   
   
  
 pantoprazole 40 mg tablet Commonly known as:  PROTONIX Your last dose was: Your next dose is: Take 1 Tab by mouth Before breakfast and dinner. 40 mg CHANGE how you take these medications Instructions Each Dose to Equal  
 Morning Noon Evening Bedtime HYDROcodone-acetaminophen 5-325 mg per tablet Commonly known as:  Shai Garcia What changed:   
- when to take this 
- reasons to take this Your last dose was: Your next dose is: Take 1 Tab by mouth every four (4) hours as needed. Max Daily Amount: 6 Tabs. Indications: Pain 1 Tab CONTINUE taking these medications Instructions Each Dose to Equal  
 Morning Noon Evening Bedtime ADVAIR DISKUS 250-50 mcg/dose diskus inhaler Generic drug:  fluticasone-salmeterol Your last dose was: Your next dose is: Take 1 Puff by inhalation every twelve (12) hours. 1 Puff FISH OIL 1,000 mg Cap Generic drug:  omega-3 fatty acids-vitamin e Your last dose was: Your next dose is: Take 1 Cap by mouth. 1 Cap STOP taking these medications   
 escitalopram oxalate 20 mg tablet Commonly known as:  Gabriella Tony FLEXERIL 10 mg tablet Generic drug:  cyclobenzaprine  
   
  
 losartan-hydroCHLOROthiazide 100-25 mg per tablet Commonly known as:  HYZAAR  
   
  
 LYRICA 50 mg capsule Generic drug:  pregabalin  
   
  
 ondansetron 8 mg disintegrating tablet Commonly known as:  ZOFRAN ODT  
   
  
 XANAX 0.5 mg tablet Generic drug:  ALPRAZolam  
   
  
  
  
Where to Get Your Medications Information on where to get these meds will be given to you by the nurse or doctor. ! Ask your nurse or doctor about these medications  
  amLODIPine 10 mg tablet  
 divalproex  mg tablet  
 hydroCHLOROthiazide 50 mg tablet HYDROcodone-acetaminophen 5-325 mg per tablet  
 lacosamide 100 mg Tab tablet  
 pantoprazole 40 mg tablet

## 2018-09-09 NOTE — ED NOTES
Pt diaphoretic and agitated. Tachycardic and tachypenic. Pt not following commands. Repetitive speech. Dr Percy Phelan at bedside.

## 2018-09-09 NOTE — CONSULTS
[facility]  Pulmonary, Critical Care, and Sleep Medicine     Name: Daniel Apodaca MRN: [id number]   : 1960 Hospital: [address] [place] [address]   Date of Service: 2018        Critical Care Consult          Consult requesting physician: Dr. Fan Pate  Reason for Consult: altered mental status/postictal state, airway management    HISTORY OF PRESENT ILLNESS:     This 60 y.o.  female is seen in consultation at the request of Dr. Christie Colón recommendations on further evaluation and management of altered mental status. The patient presented in status epilepticus and appears to currently be in a postictal state. She is obtunded. She is starting to move her extremities to noxious stimuli. She initially presented with acute onset headache that she apparently described as a \"migraine\" but as being much more severe than normal. She recently presented to the ER with similar complaints that were reportedly triggered by straining to move her bowels. No nausea or vomiting. No visual changes. No chest pain. No dyspnea. She apparently does have some chronic muscular symptoms, including cramping, for which she was recently started on Lyrica. She also takes Xanax. The patient is critically ill and cannot provide additional history due to Unconsciousness. Review of Systems:  Review of systems not obtained due to patient factors.      Allergies   Allergen Reactions    Lisinopril Cough          PAST MEDICAL/SOCIAL/FAMILY HISTORIES     Past Medical History:   Diagnosis Date    Abdominal pain     Anxiety     COPD (chronic obstructive pulmonary disease) (HCC)     DJD (degenerative joint disease) of cervical spine     Elevated cholesterol     GERD (gastroesophageal reflux disease)     H/O--NOT ON MEDICATION AT PRESENT    Hypertension     Nausea and vomiting       Past Surgical History:   Procedure Laterality Date    EXCISION TUMOR SOFT TISSUE FOOT/TOE SUBQ <1.5CM  2011    2 mccartney neuroma right foot    HX CARPAL TUNNEL RELEASE  2002    HX CHOLECYSTECTOMY  10/14/2013    HX HYSTERECTOMY  1994    vaginal (ovaries still in)    HX TONSILLECTOMY  1972    NH REMOVAL 1ST/CERVICAL RIB  1984    right side 1st rib    REMOVAL OF RIB(S)  1976    left side 1st rib      Social History   Substance Use Topics    Smoking status: Former Smoker    Smokeless tobacco: Never Used      Comment: stop 4/2013; uses electronic cigarette    Alcohol use No      Family History   Problem Relation Age of Onset    Heart Disease Mother     Hypertension Mother     Diabetes Mother     Hypertension Maternal Aunt     Diabetes Maternal Aunt     Thyroid Disease Sister       Prior to Admission medications    Medication Sig Start Date End Date Taking? Authorizing Provider   escitalopram oxalate (LEXAPRO) 20 mg tablet Take 20 mg by mouth daily. Andrew Treadwell MD   pregabalin (LYRICA) 50 mg capsule Take 50 mg by mouth three (3) times daily. Andrew Treadwell MD   HYDROcodone-acetaminophen (NORCO) 5-325 mg per tablet Take 1 Tab by mouth every eight (8) hours as needed for Pain. 10/10/13   Melissa Flores PA-C   ondansetron (ZOFRAN ODT) 8 mg disintegrating tablet Take 1 Tab by mouth every eight (8) hours as needed for Nausea. 10/10/13   Melissa Flores PA-C   fluticasone-salmeterol (ADVAIR DISKUS) 250-50 mcg/dose diskus inhaler Take 1 Puff by inhalation every twelve (12) hours. Historical Provider   omega-3 fatty acids-vitamin e (FISH OIL) 1,000 mg cap Take 1 Cap by mouth. Historical Provider   cyclobenzaprine (FLEXERIL) 10 mg tablet Take  by mouth three (3) times daily as needed. Historical Provider   losartan-hydrochlorothiazide (HYZAAR) 100-25 mg per tablet Take 1 Tab by mouth daily. Historical Provider   ALPRAZolam Estil Massa) 0.5 mg tablet Take  by mouth.     Historical Provider     Current Facility-Administered Medications   Medication Dose Route Frequency    levETIRAcetam (KEPPRA) 2,000 mg in 0.9% sodium chloride 100 mL IVPB  2,000 mg IntraVENous ONCE    acyclovir sodium (ZOVIRAX) 570 mg in 0.9% sodium chloride 100 mL IVPB  10 mg/kg (Ideal) IntraVENous Q8H    dextrose 5% and 0.9% NaCl infusion  100 mL/hr IntraVENous CONTINUOUS    rocuronium (ZEMURON) 10 mg/mL injection        sodium chloride (NS) flush 5-10 mL  5-10 mL IntraVENous Q8H    sodium chloride (NS) flush 5-10 mL  5-10 mL IntraVENous Q8H    heparin (porcine) injection 5,000 Units  5,000 Units SubCUTAneous Q8H    fosphenytoin (CEREBYX) soln 1,666.5 mg PE  15 mg PE/kg IntraVENous ONCE       OBJECTIVE:     Current Facility-Administered Medications   Medication Dose Route Frequency    levETIRAcetam (KEPPRA) 2,000 mg in 0.9% sodium chloride 100 mL IVPB  2,000 mg IntraVENous ONCE    acyclovir sodium (ZOVIRAX) 570 mg in 0.9% sodium chloride 100 mL IVPB  10 mg/kg (Ideal) IntraVENous Q8H    dextrose 5% and 0.9% NaCl infusion  100 mL/hr IntraVENous CONTINUOUS    rocuronium (ZEMURON) 10 mg/mL injection        sodium chloride (NS) flush 5-10 mL  5-10 mL IntraVENous Q8H    sodium chloride (NS) flush 5-10 mL  5-10 mL IntraVENous PRN    sodium chloride (NS) flush 5-10 mL  5-10 mL IntraVENous Q8H    sodium chloride (NS) flush 5-10 mL  5-10 mL IntraVENous PRN    acetaminophen (TYLENOL) suppository 650 mg  650 mg Rectal Q6H PRN    heparin (porcine) injection 5,000 Units  5,000 Units SubCUTAneous Q8H    fosphenytoin (CEREBYX) soln 1,666.5 mg PE  15 mg PE/kg IntraVENous ONCE       Intake/Output:   Last shift:           Last 3 shifts:      No intake or output data in the 24 hours ending 09/09/18 1040    Last 3 Recorded Weights in this Encounter    09/09/18 0500   Weight: 111.1 kg (245 lb)       Physical Exam:  Vital Signs:    Visit Vitals    /58    Pulse 90    Temp 99.2 °F (37.3 °C)    Resp 28    Wt 111.1 kg (245 lb)    SpO2 92%    BMI 40.77 kg/m2       O2 Device: Non-rebreather mask   O2 Flow Rate (L/min): 15 l/min   Temp (24hrs), Av.6 °F (37 °C), Min:98 °F (36.7 °C), Max:99.2 °F (37.3 °C)       General: alert, awake and oriented to time, person, place. Cooperative. No acute distress. Head: atraumatic, normocephalic  Eye: PERRLA, EOM intact, no scleral icterus, no pallor, no cyanosis  Nose: Nares are patent. No polyps. No exudate. No sinus tenderness. Throat: No oral thrush. No exudate. Mucous membranes are moist. No tonsillar enlargement. Neck: supple, no thyromegaly, no JVD, no lymphadenopathy. Trachea midline  Lung: Symmetric in development and expansion. Good air entry. No crackles. No wheezes. Heart: Regular S1, S2 without murmur, rub or gallop. Abdomen: soft, nontender, nondistended. Normoactive bowel sounds. No rebound. No guarding. Extremities: no pedal edema, no cyanosis, no clubbing, 2+ peripheral pulses in DP  Lymphatic: no cervical/axillary/inguinal lymphadenopathy  Neurologic: Cranial nerves II-XII are grossly symmetric and physiologic. Babinski negative. No sensory deficit. No motor deficit. [email or web address]. No cerebellar signs. Gait was not assessed. Skin: No rash or lesion. Data:     Recent Results (from the past 24 hour(s))   CBC WITH AUTOMATED DIFF    Collection Time: 18  4:45 AM   Result Value Ref Range    WBC 24.1 (H) 4.6 - 13.2 K/uL    RBC 5.55 (H) 4.20 - 5.30 M/uL    HGB 17.0 (H) 12.0 - 16.0 g/dL    HCT 49.9 (H) 35.0 - 45.0 %    MCV 89.9 74.0 - 97.0 FL    MCH 30.6 24.0 - 34.0 PG    MCHC 34.1 31.0 - 37.0 g/dL    RDW 13.1 11.6 - 14.5 %    PLATELET 186 512 - 595 K/uL    MPV 10.6 9.2 - 11.8 FL    NEUTROPHILS 28 (L) 42 - 75 %    LYMPHOCYTES 59 (H) 20 - 51 %    MONOCYTES 9 2 - 9 %    EOSINOPHILS 3 0 - 5 %    BASOPHILS 0 0 - 3 %    MYELOCYTES 1 (H) 0 %    ABS. NEUTROPHILS 6.7 1.8 - 8.0 K/UL    ABS. LYMPHOCYTES 14.2 (H) 0.8 - 3.5 K/UL    ABS. MONOCYTES 2.2 (H) 0 - 1.0 K/UL    ABS. EOSINOPHILS 0.7 (H) 0.0 - 0.4 K/UL    ABS.  BASOPHILS 0.0 0.0 - 0.1 K/UL    RBC COMMENTS NORMOCYTIC, NORMOCHROMIC      DF MANUAL     METABOLIC PANEL, COMPREHENSIVE    Collection Time: 09/09/18  4:45 AM   Result Value Ref Range    Sodium 139 136 - 145 mmol/L    Potassium 5.0 3.5 - 5.5 mmol/L    Chloride 104 100 - 108 mmol/L    CO2 21 21 - 32 mmol/L    Anion gap 14 3.0 - 18 mmol/L    Glucose 162 (H) 74 - 99 mg/dL    BUN 19 (H) 7.0 - 18 MG/DL    Creatinine 1.49 (H) 0.6 - 1.3 MG/DL    BUN/Creatinine ratio 13 12 - 20      GFR est AA 43 (L) >60 ml/min/1.73m2    GFR est non-AA 36 (L) >60 ml/min/1.73m2    Calcium 10.3 (H) 8.5 - 10.1 MG/DL    Bilirubin, total 0.7 0.2 - 1.0 MG/DL    ALT (SGPT) 33 13 - 56 U/L    AST (SGOT) 24 15 - 37 U/L    Alk.  phosphatase 131 (H) 45 - 117 U/L    Protein, total 8.2 6.4 - 8.2 g/dL    Albumin 4.4 3.4 - 5.0 g/dL    Globulin 3.8 2.0 - 4.0 g/dL    A-G Ratio 1.2 0.8 - 1.7     TROPONIN I    Collection Time: 09/09/18  4:45 AM   Result Value Ref Range    Troponin-I, Qt. <0.02 0.0 - 0.045 NG/ML   PROTHROMBIN TIME + INR    Collection Time: 09/09/18  4:45 AM   Result Value Ref Range    Prothrombin time 12.1 11.5 - 15.2 sec    INR 0.9 0.8 - 1.2     PTT    Collection Time: 09/09/18  4:45 AM   Result Value Ref Range    aPTT 27.7 23.0 - 36.4 SEC   ETHYL ALCOHOL    Collection Time: 09/09/18  4:45 AM   Result Value Ref Range    ALCOHOL(ETHYL),SERUM <3 0 - 3 MG/DL   CK    Collection Time: 09/09/18  4:45 AM   Result Value Ref Range    CK 68 26 - 192 U/L   MAGNESIUM    Collection Time: 09/09/18  4:45 AM   Result Value Ref Range    Magnesium 2.1 1.6 - 2.6 mg/dL   TSH 3RD GENERATION    Collection Time: 09/09/18  4:45 AM   Result Value Ref Range    TSH 4.90 (H) 0.36 - 3.74 uIU/mL   T4, FREE    Collection Time: 09/09/18  4:45 AM   Result Value Ref Range    T4, Free 1.3 0.7 - 1.5 NG/DL   EKG, 12 LEAD, INITIAL    Collection Time: 09/09/18  5:09 AM   Result Value Ref Range    Ventricular Rate 157 BPM    Atrial Rate 157 BPM    P-R Interval 120 ms    QRS Duration 90 ms    Q-T Interval 292 ms    QTC Calculation (Bezet) 472 ms    Calculated R Axis -82 degrees    Calculated T Axis 70 degrees    Diagnosis       Sinus tachycardia  Left anterior fascicular block  Junctional ST depression, probably normal  Abnormal ECG  When compared with ECG of 12-OCT-2013 09:03,  Vent.  rate has increased BY  97 BPM     URINALYSIS W/ RFLX MICROSCOPIC    Collection Time: 09/09/18  6:00 AM   Result Value Ref Range    Color YELLOW      Appearance CLEAR      Specific gravity 1.011 1.005 - 1.030      pH (UA) 5.0 5.0 - 8.0      Protein NEGATIVE  NEG mg/dL    Glucose NEGATIVE  NEG mg/dL    Ketone NEGATIVE  NEG mg/dL    Bilirubin NEGATIVE  NEG      Blood NEGATIVE  NEG      Urobilinogen 0.2 0.2 - 1.0 EU/dL    Nitrites NEGATIVE  NEG      Leukocyte Esterase NEGATIVE  NEG     DRUG SCREEN, URINE    Collection Time: 09/09/18  6:00 AM   Result Value Ref Range    BENZODIAZEPINES POSITIVE (A) NEG      BARBITURATES NEGATIVE  NEG      THC (TH-CANNABINOL) POSITIVE (A) NEG      OPIATES NEGATIVE  NEG      PCP(PHENCYCLIDINE) NEGATIVE  NEG      COCAINE NEGATIVE  NEG      AMPHETAMINES NEGATIVE  NEG      METHADONE NEGATIVE  NEG      HDSCOM (NOTE)    EKG, 12 LEAD, SUBSEQUENT    Collection Time: 09/09/18  6:26 AM   Result Value Ref Range    Ventricular Rate 133 BPM    Atrial Rate 133 BPM    P-R Interval 142 ms    QRS Duration 84 ms    Q-T Interval 300 ms    QTC Calculation (Bezet) 446 ms    Calculated P Axis 64 degrees    Calculated R Axis -91 degrees    Calculated T Axis 71 degrees    Diagnosis       Sinus tachycardia  Possible Left atrial enlargement  Right superior axis deviation  Pulmonary disease pattern  RSR' or QR pattern in V1 suggests right ventricular conduction delay  Abnormal ECG  When compared with ECG of 09-SEP-2018 05:09,  No significant change was found               Recent Labs      09/09/18   0445   WBC  24.1*   HGB  17.0*   HCT  49.9*   PLT  389     Recent Labs      09/09/18   0445   NA  139   K  5.0   CL  104   CO2  21   GLU  162*   BUN  19*   CREA  1.49*   CA 10.3*   MG  2.1   ALB  4.4   SGOT  24   ALT  33   INR  0.9       Lab Results   Component Value Date/Time    Color YELLOW 09/09/2018 06:00 AM    Appearance CLEAR 09/09/2018 06:00 AM    Specific gravity 1.011 09/09/2018 06:00 AM    pH (UA) 5.0 09/09/2018 06:00 AM    Protein NEGATIVE  09/09/2018 06:00 AM    Glucose NEGATIVE  09/09/2018 06:00 AM    Ketone NEGATIVE  09/09/2018 06:00 AM    Bilirubin NEGATIVE  09/09/2018 06:00 AM    Urobilinogen 0.2 09/09/2018 06:00 AM    Nitrites NEGATIVE  09/09/2018 06:00 AM    Leukocyte Esterase NEGATIVE  09/09/2018 06:00 AM       Telemetry: sinus tachycardia    Imaging:  [x] I have personally reviewed the patients radiographs  [] Radiographs reviewed with radiologist  [] No CXR study available for review today  [] No change from prior study, tubes and lines are in adequate position  [] Improved   []Worsening    Ct Head Wo Cont    Result Date: 9/9/2018  IMPRESSION: 1. Moderately motion limited examination. Within the limitations of motion artifact, no acute intracranial abnormality or significant interval change from recent prior scan performed 9/5/2018. Note: Preliminary report sent to the Emergency Department by the radiology resident at the time of the study. Xr Chest Port    Result Date: 9/9/2018  Impression: Rotated projection of the chest without superimposed acute radiographic cardiopulmonary abnormality.       ASSESSMENT:   Principal Problem:    Status epilepticus ([facility] [address] (9/9/2018)    Active Problems:    Postictal state ([address] (9/9/2018)      Tachycardia (9/9/2018)      Positive urine drug screen (9/9/2018)      Overview: THC      Acute kidney failure ([address] (9/9/2018)      Erythrocytosis (9/9/2018)      Lymphocytosis (9/9/2018)      GERD (gastroesophageal reflux disease) (9/9/2018)      Overview: H/O--NOT ON MEDICATION AT PRESENT      HTN (hypertension) (9/27/2013)      COPD (chronic obstructive pulmonary disease) ([facility] [address] (9/27/2013)      Anxiety (9/9/2018)      CODE STATUS: FULL CODE      PLAN/RECOMMENDATIONS:   · Admit to ICU  · Appreciate Dr. Elina Blackwell input. LP pending. Defer antiepileptic therapy to Dr. Marit Collet. Currently, on fosphenytoin/levetiracetam/phenobarbital/valproate. · Close monitoring of airway  · Follow CBC. Erythrocytosis and leukocytosis with lymphocytosis may warrant bone marrow evaluation. No risk factors or findings to raise concern for tuberculosis  · Brovana/Pulmicort  · Takes Hyzaar 100/25 at home for blood pressure control. Will use labetalol PRN for now. · Nutrition: NPO for now  · Replace electrolytes per ICU electrolyte replacement protocol  · HOB >=30 degree, aggressive pulmonary toileting, incentive spirometry  · PT/OT eval and treat  · GI Prophylaxis with Protonix  · DVT Prophylaxis with heparin  · Further recommendations will be based on the patient's response to recommended treatment and results of the investigation ordered. The patient is: [x] acutely ill Risk of deterioration: [] moderate    [x] critically ill  [x] high     My assessment, plan of care, findings, medications, side effects, etc were discussed with:  [x] Nurse [] PT/OT    [x] Respiratory therapy [x] Dr. Marit Collet, Dr. Carl Hector   [x] Family [x] Patient: answered all questions to satisfaction   [] Pharmacist []      [x] Case & management strategies discussed today on multidisciplinary rounds    [x] Total critical care time spent: 45 minutes, reviewing the case, medical record, data, notes, EMR, patient examination, documentation, coordinating care with nurse/consultants, exclusive of procedures (with complex decision making performed and > 50% time spent in face to face evaluation). Zulema Walter MD  Board Certified by the [employer], Pulmonary Diseases & Critical Care Medicine  9/9/2018       ADDENDUM (9152): MRI/MRA images reviewed. Findings raise suspicion for PRES and subarachnoid and/or subpial hemorrhage. Images reviewed with Dr. Angelo Neri (Radiology) by phone.  Will stop heparin for jillian.    Toery Quiles Brain Wo Cont    Result Date: 9/9/2018  IMPRESSION: 1. Motion degraded evaluation. 2.  Possible stenoses involving the origins of the superior and inferior divisions of the left middle cerebral artery but the severity of stenosis is likely exaggerated by motion and could also be entirely artifactual. 3.  Otherwise, unremarkable evaluation. STENOSIS KEY: Mild = less than 25% Mild to moderate = 25-50% Moderate = 40-60% Moderate to severe = 50-75% Severe = greater than 75%    Mri Brain W Wo Cont    Result Date: 9/9/2018  IMPRESSION: 1. Patchy areas of T2 prolongation predominantly involving the parietal occipital regions but extending along the frontoparietal convexities and also minimally involving the cerebellum. Given the patient's presentation and above constellation of findings, the appearance is most suggestive of posterior reversible encephalopathic syndrome (PRES). However, atypical infectious and inflammatory processes would also be in the differential. 2.  Minimal subarachnoid and/or subcutaneous hemorrhage along the high right frontal convexity, grossly unchanged in correlation with the previously obtained and motion degraded CT. 3.  No evidence of acute infarct. Critical results pertaining to the presence of subarachnoid/subpial hemorrhage along the right frontal convexity were communicated directly to Dr. Live Dye at the time of interpretation (2:30 PM). Ct Head Wo Cont    Result Date: 9/9/2018  IMPRESSION: 1. Moderately motion limited examination. Within the limitations of motion artifact, no acute intracranial abnormality or significant interval change from recent prior scan performed 9/5/2018. Note: Preliminary report sent to the Emergency Department by the radiology resident at the time of the study. Xr Chest Port    Result Date: 9/9/2018  Impression: Rotated projection of the chest without superimposed acute radiographic cardiopulmonary abnormality.

## 2018-09-09 NOTE — CONSULTS
Neurology Consult  9/9/2018     HPI: This is a 60y.o. -year-old female, presented to [facility] ED 9/5 with headache during defecation. Dx as migraine; head CT was negative. Improved in ED and pt was was discharged. Thurs 9/6 and Fri 9/7 identical scenario recurred. Awakened 4 am 9/9 by severe headache. Called daughter, reported shaking during phone call. Brought to ED. Repeat CT negative;  Began having focal seizures affecting left arm and leg around 6 am followed by generalized seizures with head and eye deviation to the left, rigidity of both legs, jerking of left arm and leg;  Right arm tone remained normal without shaking during these seizures. Episodes lasted about 1.5 minute each  And were  by intervals of 5 minutes or so. Failed to respond to 2 g IV Keppra followed by 1500 mg IV Depakon; Controlled by loading dose of 1500 mg iv PB. PMH:     Past Medical History:   Diagnosis Date    Abdominal pain     Anxiety     COPD (chronic obstructive pulmonary disease) (HCC)     DJD (degenerative joint disease) of cervical spine     Elevated cholesterol     GERD (gastroesophageal reflux disease)     H/O--NOT ON MEDICATION AT PRESENT    Hypertension     Nausea and vomiting        SH: UDS positive for THC. FH: is negative for inherited neurologic diseases. ROS: is not possible    PE: on physical examination, the general examination revealed no significant skin lesions. There were no palpable nodes. The thyroid was not not enlarged. There are no carotid or vertebral bruits. The chest is clear to auscultation and percussion. Examination of the heart revealed S1 S2 without murmur or gallop. The abdomen soft nontender with normally active bowel sounds. There is no hepatosplenomegaly or mass. The extremities were unremarkable. There was no clubbing, cyanosis or edema. Neurologically, the patient was starting to wake up, using right hand slightly.   Pupils are reactive from 4 mm to 2 mm bilaterally. Funduscopic examination revealed flat disks and normal vasculature bilaterally. Eyes were midposition and conjugate,     Motor examination revealed normal tone and bulk throughout. Tendon reflexes were tr + and symmetric. Plantar responses were flexor. Pt crossed her ankles at one point. Findings: nonfocal post-ictal neurologic examination Impression:     Assessment/Plan:    New onset of focal status epilepticus:  History raises question of SAH;  CT scan today is suboptimal quality but raises question of focus of hemorrhage high right hemisphere. Given focality of seizures, drug toxicity or metabolic causes are less likely and focal brain injury due to bleeding, infarction, infection, autoimmunity, more likely. LP performed at L4-5 without difficulty.  mm water. Fluid clear after first tube. Tube 1  Cell count, crypto antigen, NMDA receptor antibodies, paraneoplastic panel, reserve fluid for [facility] panel  Tube 2:  Cytology, flow cytometry 8 cc  Tube 3:  8 cc culture bact, TB, fungal, PCR for HSV 1/2  Tube 4:  Cell count, prot, glucose 8 cc    MRI/MRA ordered    EEG in AM  Addendum:  CSF negative tubes 1 & 4;  MRI suggests small area of subarachnoid hemorrhage on cortical surface right frontal lobe plus patchy brain edema, possibly due to malignant hypertension. Will continue depakote and phenobarbital, check EEG on Monday.

## 2018-09-09 NOTE — PROGRESS NOTES
4476: Pt arrived to ICU from ED. Seizing, unresponsive. 1015: Phenobarbital infusing at a faster rate than 233.1ml/hr per dr. Elham Peralta. 1022: Pt snoring-nasal trumpet placed in L nare to try to protect airway 1025: Dr. Reena Trinh, Dr. Josh Perdomo, Dr. Elham Peralta at bedside with patient discussing patient care. 1030: Dr. Elham Peralta preparing for lumbar puncture at bedside-consent signed. 1049: Assisting with lumbar puncture with dr Poli Hardy. Two other RN's to help position patient during procedure 1250: Pt in MRI-VSS. O2 Nasal cannula 6L/min 97%, 138/56.  
1400: Arrived back in ICU-pt VSS stable on non-rebreather. No family present at time. 1800: Placed NGT per ICU MD. Verified by XR. 
1900: Bedside shift change report given to Melo Bray RN (oncoming nurse) by Yasmin Taylor RN (offgoing nurse). Report included the following information SBAR, Kardex and Recent Results.

## 2018-09-09 NOTE — ED NOTES
Received bedside report from nurse, patient in the bed with tremors throughout the body. Patient on cardiac monitor. Patient diaphoretic, placed clean gown on the patient, placed dry sheets and chucks under the patient, replaced the monitor leads. Family at the bedside. Daughter states she has had 4 \"episodes\" since Wednesday, Patient presently has her head to the left and is gazing to the left. Did suction the patient

## 2018-09-09 NOTE — H&P
Internal Medicine History and Physical 
   
 
 
Subjective HPI: Antonio Molina is a 60 y.o. female with a PMHx of COPD, anxiety, HTN, GERD who presented to the ED with complaints of severe headache and muscle spasms. She was stable in the ED and had tele-neuro consult with plans for observation and MRI. Unfortunately, when I arrived at the ED to evaluate the patient she was actively seizing w/ tonic-clonic activity and eye deviation to the left. She was given a total of 8 mg of ativan but was refractory. Tele-neuro was consulted again in the ED and evaluated her and recommended keppra load of 2g. She was refractory to this as well and local neurology was consulted. They recommended depakote load of 1500mg followed by phenobarbital if ineffective. Neurology was going to come in to do LP as well given negative CT head. She has history of isolated leukocytosis (baseline around 19), but her WBCs today were 24. Her daughter was at the bedside and stated she had been complaining about left sided numbness this week to her. She was recently started on lyrica and takes flexeril as well. She takes xanax nightly apparently, but daughter unsure if she ran out of this or not. The daughter denies that her mother is a heavy drinker. She has no history of seizure activity in the past. Dr. Christina Gonzalez was present in the ED during the initial management of patient. Also, apparently her SBP was close to or above 200 on arrival, but highest I see was 196 PMHx: 
Past Medical History:  
Diagnosis Date  Abdominal pain  Anxiety  COPD (chronic obstructive pulmonary disease) (HCC)  DJD (degenerative joint disease) of cervical spine  Elevated cholesterol  GERD (gastroesophageal reflux disease) H/O--NOT ON MEDICATION AT PRESENT  
 Hypertension  Nausea and vomiting PSurgHx: 
Past Surgical History:  
Procedure Laterality Date  EXCISION TUMOR SOFT TISSUE FOOT/TOE SUBQ <1.5CM  2011 2 mccartney neuroma right foot  HX CARPAL TUNNEL RELEASE  2002  HX CHOLECYSTECTOMY  10/14/2013  HX HYSTERECTOMY  1994  
 vaginal (ovaries still in) [address]  SC REMOVAL 1ST/CERVICAL RIB  1984  
 right side 1st rib  REMOVAL OF RIB(S)  1976  
 left side 1st rib SocialHx: 
Social History Social History  Marital status: SINGLE Spouse name: N/A  
 Number of children: N/A  
 Years of education: N/A Occupational History  Not on file. Social History Main Topics  Smoking status: Former Smoker  Smokeless tobacco: Never Used Comment: stop 4/2013; uses electronic cigarette  Alcohol use No  
 Drug use: No  
 Sexual activity: Not on file Other Topics Concern  Not on file Social History Narrative Allergies: Allergies Allergen Reactions  Lisinopril Cough FamilyHx: 
Family History Problem Relation Age of Onset  Heart Disease Mother  Hypertension Mother  Diabetes Mother  Hypertension Maternal Aunt  Diabetes Maternal Aunt  Thyroid Disease Sister Prior to Admission Medications Prescriptions Last Dose Informant Patient Reported? Taking? ALPRAZolam (XANAX) 0.5 mg tablet   Yes No  
Sig: Take  by mouth. HYDROcodone-acetaminophen (NORCO) 5-325 mg per tablet   No No  
Sig: Take 1 Tab by mouth every eight (8) hours as needed for Pain. cyclobenzaprine (FLEXERIL) 10 mg tablet   Yes No  
Sig: Take  by mouth three (3) times daily as needed. escitalopram oxalate (LEXAPRO) 20 mg tablet   Yes No  
Sig: Take 20 mg by mouth daily. fluticasone-salmeterol (ADVAIR DISKUS) 250-50 mcg/dose diskus inhaler   Yes No  
Sig: Take 1 Puff by inhalation every twelve (12) hours. losartan-hydrochlorothiazide (HYZAAR) 100-25 mg per tablet   Yes No  
Sig: Take 1 Tab by mouth daily. omega-3 fatty acids-vitamin e (FISH OIL) 1,000 mg cap   Yes No  
Sig: Take 1 Cap by mouth. ondansetron (ZOFRAN ODT) 8 mg disintegrating tablet   No No  
Sig: Take 1 Tab by mouth every eight (8) hours as needed for Nausea. pregabalin (LYRICA) 50 mg capsule   Yes No  
Sig: Take 50 mg by mouth three (3) times daily. Facility-Administered Medications: None Review of Systems: 
Unable to assess 2/2 mentation Objective Visit Vitals  /58  Pulse 90  Temp 98 °F (36.7 °C)  Resp 28  Wt 111.1 kg (245 lb)  SpO2 92%  BMI 40.77 kg/m2 Physical Exam: 
General Appearance: Non-conversant w/ tonic-clonic seizures, diaphoretic HENT: normocephalic/atraumatic, Eyes deviated to L Lungs: CTA with normal respiratory effort Cardiovascular: Tachycardic w/ RR, no m/r/g, capillary refill < 2 sec, B/L DP/PT pulses +3/4 Abdomen: soft, non-tender, normal bowel sounds Extremities: no cyanosis, trace peripheral edema Neuro: unresponsive, eye deviation L, oculocephalic reflex intact Psych: not agitated, unresponsive Laboratory Studies: 
CMP:  
Lab Results Component Value Date/Time  09/09/2018 04:45 AM  
 K 5.0 09/09/2018 04:45 AM  
  09/09/2018 04:45 AM  
 CO2 21 09/09/2018 04:45 AM  
 AGAP 14 09/09/2018 04:45 AM  
  (H) 09/09/2018 04:45 AM  
 BUN 19 (H) 09/09/2018 04:45 AM  
 CREA 1.49 (H) 09/09/2018 04:45 AM  
 GFRAA 43 (L) 09/09/2018 04:45 AM  
 GFRNA 36 (L) 09/09/2018 04:45 AM  
 CA 10.3 (H) 09/09/2018 04:45 AM  
 MG 2.1 09/09/2018 04:45 AM  
 ALB 4.4 09/09/2018 04:45 AM  
 TP 8.2 09/09/2018 04:45 AM  
 GLOB 3.8 09/09/2018 04:45 AM  
 AGRAT 1.2 09/09/2018 04:45 AM  
 SGOT 24 09/09/2018 04:45 AM  
 ALT 33 09/09/2018 04:45 AM  
 
CBC:  
Lab Results Component Value Date/Time WBC 24.1 (H) 09/09/2018 04:45 AM  
 HGB 17.0 (H) 09/09/2018 04:45 AM  
 HCT 49.9 (H) 09/09/2018 04:45 AM  
  09/09/2018 04:45 AM  
 
 
Imaging Reviewed: 
Ct Head Wo Cont Result Date: 9/9/2018 EXAM: CT head INDICATION: Severe headache COMPARISON: Head CT performed 9/5/2018 TECHNIQUE: Axial CT imaging of the head was performed without intravenous contrast. One or more dose reduction techniques were used on this CT: automated exposure control, adjustment of the mAs and/or kVp according to patient's size, and iterative reconstruction techniques. The specific techniques utilized on this CT exam have been documented in the patient's electronic medical record. _______________ FINDINGS: Anatomic detail is examination is moderately limited by motion artifact which is present throughout the imaged volume. BRAIN AND POSTERIOR FOSSA: Cortical sulci volume appears stable from prior examination. Ventricular size and configuration is similar to prior. Basilar cisterns are patent. Within the limitations of motion artifact, no discrete intra-axial fluid collection. No mass effect or midline shift. EXTRA-AXIAL SPACES AND MENINGES: Within the limitations of motion, no discrete extra-axial fluid collection. CALVARIUM: No acute osseous abnormality. SINUSES: Imaged paranasal sinuses and mastoid air cells are clear. OTHER: Atherosclerotic vascular calcifications of the carotid siphons are present bilaterally. _______________ IMPRESSION: 1. Moderately motion limited examination. Within the limitations of motion artifact, no acute intracranial abnormality or significant interval change from recent prior scan performed 9/5/2018. Note: Preliminary report sent to the Emergency Department by the radiology resident at the time of the study. Xr Chest [facility] Result Date: 9/9/2018 Chest, single view Indication: Headache Comparison: Several prior chest radiographs, most recently 1/16/2014 Findings:  Portable upright AP view of the chest was obtained. The patient is rotated on the provided projection with associated foreshortening of the right hemithorax. No focal pneumonic consolidation, pneumothorax, or pleural effusion. Radiodensities projecting over the peripheral right upper lung, unchanged. Normal cardiac size and mediastinal contours. No acute osseous abnormality. Impression: Rotated projection of the chest without superimposed acute radiographic cardiopulmonary abnormality. Assessment/Plan Active Hospital Problems Diagnosis Date Noted  Tachycardia 09/09/2018  Status epilepticus ([facility] 09/09/2018  Anxiety 09/09/2018  GERD (gastroesophageal reflux disease) 09/09/2018 H/O--NOT ON MEDICATION AT PRESENT  Acute kidney failure ([facility] 09/09/2018  COPD (chronic obstructive pulmonary disease) ([facility] 09/27/2013  
 HTN (hypertension) 09/27/2013  
 
- Ativan PRN 
- Received depakote, keppra loads 
- Neuro consulted, plan for LP 
- LP labs in place, f/u results - Acyclovir started per teleneuro - Intubation if needed w/ propofol 
- Continuous EEG 
- Consulted ICU 
- NPO 
- IVFs - Trend BMP 
- Echo 
- Seizure precautions 
- DVT protocol I have personally reviewed all pertinent labs, films and EKGs that have officially resulted. I reviewed available electronic documentation outlining the initial presentation as well as the emergency room physician's encounter. Mj Schultz, DO Internal Medicine, Hospitalist 
Pager: [phone] [facility]

## 2018-09-09 NOTE — PROGRESS NOTES
Patient signed out to me as needing admission to the hospital, concern for serotonin syndrome. Came to ED to evaluate for admission. Patient actively seizing in ED. Asked nursing to give 2mg IV ativan STAT. Discussed case with Dr. Lauri Bello, who the case was signed over to as well. Pt will need additional IV meds as appears to be in status. Teleneuro to be consulted again. Marnie Stauffer, DO Internal Medicine, Hospitalist 
Pager: [phone] [facility]

## 2018-09-09 NOTE — IP AVS SNAPSHOT
[address] [address] [address] Patient: Ezequiel Andino MRN: [id number] About your hospitalization You were admitted on:  September 9, 2018 You last received care in the:  [facility] 2E GEN SURG You were discharged on:  September 18, 2018 Why you were hospitalized Your primary diagnosis was:  Status Epilepticus (Hcc) Your diagnoses also included: Tachycardia, Copd (Chronic Obstructive Pulmonary Disease) (Hcc), Htn (Hypertension), Anxiety, Gerd (Gastroesophageal Reflux Disease), Acute Kidney Failure (Hcc), Erythrocytosis, Lymphocytosis, Positive Urine Drug Screen, Postictal State (Hcc), Sah (Subarachnoid Hemorrhage) (Hcc), Pres (Posterior Reversible Encephalopathy Syndrome) Follow-up Information Follow up With Details Comments Contact Info Omid Nugent MD In 1 week Reveiw recent hospitalization [address] [id number] [phone] Hero Arriaga MD Call in 1 month Reveiw recent hospitalization [address] [facility] [id number] 
[phone] Discharge Orders None A check maría indicates which time of day the medication should be taken. My Medications START taking these medications Instructions Each Dose to Equal  
 Morning Noon Evening Bedtime  
 amLODIPine 10 mg tablet Commonly known as:  Clune Blade Your last dose was: Your next dose is: Take 1 Tab by mouth daily. 10 mg  
    
   
   
   
  
 divalproex  mg tablet Commonly known as:  DEPAKOTE Your last dose was: Your next dose is: Take 3 Tabs by mouth three (3) times daily. 750 mg  
    
   
   
   
  
 hydroCHLOROthiazide 50 mg tablet Commonly known as:  HYDRODIURIL Your last dose was: Your next dose is: Take 1 Tab by mouth daily.   
 50 mg  
    
 lacosamide 100 mg Tab tablet Commonly known as:  VIMPAT Your last dose was: Your next dose is: Take 1 Tab by mouth two (2) times a day. Max Daily Amount: 200 mg.  
 100 mg  
    
   
   
   
  
 pantoprazole 40 mg tablet Commonly known as:  PROTONIX Your last dose was: Your next dose is: Take 1 Tab by mouth Before breakfast and dinner. 40 mg CHANGE how you take these medications Instructions Each Dose to Equal  
 Morning Noon Evening Bedtime HYDROcodone-acetaminophen 5-325 mg per tablet Commonly known as:  Joi Ledesma What changed:   
- when to take this 
- reasons to take this Your last dose was: Your next dose is: Take 1 Tab by mouth every four (4) hours as needed. Max Daily Amount: 6 Tabs. Indications: Pain 1 Tab CONTINUE taking these medications Instructions Each Dose to Equal  
 Morning Noon Evening Bedtime ADVAIR DISKUS 250-50 mcg/dose diskus inhaler Generic drug:  fluticasone-salmeterol Your last dose was: Your next dose is: Take 1 Puff by inhalation every twelve (12) hours. 1 Puff FISH OIL 1,000 mg Cap Generic drug:  omega-3 fatty acids-vitamin e Your last dose was: Your next dose is: Take 1 Cap by mouth. 1 Cap STOP taking these medications   
 escitalopram oxalate 20 mg tablet Commonly known as:  Dakotah Oseguera FLEXERIL 10 mg tablet Generic drug:  cyclobenzaprine  
   
  
 losartan-hydroCHLOROthiazide 100-25 mg per tablet Commonly known as:  HYZAAR  
   
  
 LYRICA 50 mg capsule Generic drug:  pregabalin  
   
  
 ondansetron 8 mg disintegrating tablet Commonly known as:  ZOFRAN ODT  
   
  
 XANAX 0.5 mg tablet Generic drug:  ALPRAZolam  
   
  
  
  
Where to Get Your Medications Information on where to get these meds will be given to you by the nurse or doctor. ! Ask your nurse or doctor about these medications  
  amLODIPine 10 mg tablet  
 divalproex  mg tablet  
 hydroCHLOROthiazide 50 mg tablet HYDROcodone-acetaminophen 5-325 mg per tablet  
 lacosamide 100 mg Tab tablet  
 pantoprazole 40 mg tablet Opioid Education Prescription Opioids: What You Need to Know: 
 
 
 
F-face looks uneven A-arms unable to move or move unevenly S-speech slurred or non-existent T-time-call 911 as soon as signs and symptoms begin-DO NOT go Back to bed or wait to see if you get better-TIME IS BRAIN. Warning Signs of HEART ATTACK Call 911 if you have these symptoms: 
? Chest discomfort. Most heart attacks involve discomfort in the center of the chest that lasts more than a few minutes, or that goes away and comes back. It can feel like uncomfortable pressure, squeezing, fullness, or pain. ? Discomfort in other areas of the upper body. Symptoms can include pain or discomfort in one or both arms, the back, neck, jaw, or stomach. ? Shortness of breath with or without chest discomfort. ? Other signs may include breaking out in a cold sweat, nausea, or lightheadedness. Don't wait more than five minutes to call [address]! Fast action can save your life.  Calling 911 is almost always the fastest way to get lifesaving treatment. Emergency Medical Services staff can begin treatment when they arrive  up to an hour sooner than if someone gets to the hospital by car. The discharge information has been reviewed with the patient. The patient verbalized understanding. Discharge medications reviewed with the patient and appropriate educational materials and side effects teaching were provided.' Seizure: Care Instructions Your Care Instructions Seizures are caused by abnormal patterns of electrical signals in the brain. They are different for each person. Seizures can affect movement, speech, vision, or awareness. Some people have only slight shaking of a hand and do not pass out. Other people may pass out and have violent shaking of the whole body. Some people appear to stare into space. They are awake, but they can't respond normally. Later, they may not remember what happened. You may need tests to identify the type and cause of the seizures. A seizure may occur only once, or you may have them more than one time. Taking medicines as directed and following up with your doctor may help keep you from having more seizures. The doctor has checked you carefully, but problems can develop later. If you notice any problems or new symptoms, get medical treatment right away. Follow-up care is a key part of your treatment and safety. Be sure to make and go to all appointments, and call your doctor if you are having problems. It's also a good idea to know your test results and keep a list of the medicines you take. How can you care for yourself at home? · Be safe with medicines. Take your medicines exactly as prescribed. Call your doctor if you think you are having a problem with your medicine. · Do not do any activity that could be dangerous to you or others until your doctor says it is safe to do so. For example, do not drive a car, operate machinery, swim, or climb ladders. · Be sure that anyone treating you for any health problem knows that you have had a seizure and what medicines you are taking for it. · Identify and avoid things that may make you more likely to have a seizure. These may include lack of sleep, alcohol or drug use, stress, or not eating. · Make sure you go to your follow-up appointment. When should you call for help? Call 911 anytime you think you may need emergency care. For example, call if: 
  · You have another seizure.  
  · You have more than one seizure in 24 hours.  
  · You have new symptoms, such as trouble walking, speaking, or thinking clearly.  
 Call your doctor now or seek immediate medical care if: 
  · You are not acting normally.  
 Watch closely for changes in your health, and be sure to contact your doctor if you have any problems. Where can you learn more? Go to http://lyla-shirlene.info/. Enter T561 in the search box to learn more about \"Seizure: Care Instructions. \" Current as of: October 9, 2017 Content Version: 11.7 © 5676-3713 [employer]. Care instructions adapted under license by [employer] (which disclaims liability or warranty for this information). If you have questions about a medical condition or this instruction, always ask your healthcare professional. [address] any warranty or liability for your use of this information. ___________________________________________________________________________________________________________________________________ [employer] Announcement We are excited to announce that we are making your provider's discharge notes available to you in REMOTVt. You will see these notes when they are completed and signed by the physician that discharged you from your recent hospital stay.   If you have any questions or concerns about any information you see in [employer], please call the [employer] Department where you were seen or reach out to your Primary Care Provider for more information about your plan of care. Introducing [facility]! Dear Lito Astorga: 
Thank you for requesting a Manifest account. Our records indicate that you already have an active Manifest account. You can access your account anytime at [email or web address] Did you know that you can access your hospital and ER discharge instructions at any time in Manifest? You can also review all of your test results from your hospital stay or ER visit. Additional Information If you have questions, please visit the Frequently Asked Questions section of the Manifest website at [email or web address] Remember, Manifest is NOT to be used for urgent needs. For medical emergencies, dial 911. Now available from your iPhone and Android! Introducing Vinnie Ng As a [employer] patient, I wanted to make you aware of our electronic visit tool called Vinnie Ng. [employer] 24/7 allows you to connect within minutes with a medical provider 24 hours a day, seven days a week via a mobile device or tablet or logging into a secure website from your computer. You can access Vinnie Ng from anywhere in the [place]. A virtual visit might be right for you when you have a simple condition and feel like you just dont want to get out of bed, or cant get away from work for an appointment, when your regular [employer] provider is not available (evenings, weekends or holidays), or when youre out of town and need minor care. Electronic visits cost only $49 and if the [employer] 24/7 provider determines a prescription is needed to treat your condition, one can be electronically transmitted to a nearby pharmacy*. Please take a moment to enroll today if you have not already done so. The enrollment process is free and takes just a few minutes.   To enroll, please download the [employer] 24/7 sharif to your tablet or phone, or visit [email or web address] to enroll on your computer. And, as an [address] [place] [address] Street patient with a [employer] account, the results of your visits will be scanned into your electronic medical record and your primary care provider will be able to view the scanned results. We urge you to continue to see your regular [employer] provider for your ongoing medical care. And while your primary care provider may not be the one available when you seek a [employer] virtual visit, the peace of mind you get from getting a real diagnosis real time can be priceless. For more information on [employer], view our Frequently Asked Questions (FAQs) at [email or web address]. Sincerely, 
 
Bridgette Whyte MD 
Chief Medical Officer [facility] Karo Musa *:  certain medications cannot be prescribed via [employer] Unresulted Labs-Please follow up with your PCP about these lab tests Order Current Status AFB CULTURE + SMEAR W/RFLX PCR AND ID Preliminary result CULTURE, FUNGUS Preliminary result Providers Seen During Your Hospitalization Provider Specialty Primary office phone Shelby Paiz MD Emergency Medicine [phone] Brice Meigs, MD Internal Medicine [phone] Kamini Cartagena DO Internal Medicine [phone] Your Primary Care Physician (PCP) Primary Care Physician Office Phone Office Fax Tova Lopez [phone] You are allergic to the following Allergen Reactions Lisinopril Cough Recent Documentation Height Weight BMI OB Status Smoking Status 1.651 m 104 kg 38.15 kg/m2 Postmenopausal Former Smoker Emergency Contacts Name Discharge Info Relation Home Work Mobile [facility] DISCHARGE CAREGIVER [3] Daughter [21] [phone] [facility] DISCHARGE CAREGIVER [3] Daughter [21] [phone] Patient Belongings The following personal items are in your possession at time of discharge: 
  Dental Appliances: None  Visual Aid: None      Home Medications: None   Jewelry: None  Clothing: None    Other Valuables: None Please provide this summary of care documentation to your next provider. Signatures-by signing, you are acknowledging that this After Visit Summary has been reviewed with you and you have received a copy. Patient Signature:  ____________________________________________________________ Date:  ____________________________________________________________  
  
[facility] Provider Signature:  ____________________________________________________________ Date:  ____________________________________________________________

## 2018-09-09 NOTE — ED PROVIDER NOTES
EMERGENCY DEPARTMENT HISTORY AND PHYSICAL EXAM 
 
6:22 AM 
 
 
Date: 9/9/2018 Patient Name: Daniel Apodaca History of Presenting Illness Chief Complaint Patient presents with  
 Headache Patient with history of hypertension, anxiety COPD reflux chronic back pain presents via EMS with headache, acute onset today top of the head, she was here recently for the exact same type of headache but that occurred when moving her bowels. She denies any visual changes admits to nausea no vomiting she does have diarrhea for the past 3 days denies any chest pain in his to shortness of breath no fever or cough, denies any urinary symptoms, no weakness of arms or legs but does report that everything is becoming numb and has diffuse pain all over her body, reports that her hands are cramping. She did take her Xanax today, recently started lyrica PCP: Bryan Pal MD 
 
Current Outpatient Prescriptions Medication Sig Dispense Refill  escitalopram oxalate (LEXAPRO) 20 mg tablet Take 20 mg by mouth daily.  pregabalin (LYRICA) 50 mg capsule Take 50 mg by mouth three (3) times daily.  HYDROcodone-acetaminophen (NORCO) 5-325 mg per tablet Take 1 Tab by mouth every eight (8) hours as needed for Pain. 10 Tab 0  
 ondansetron (ZOFRAN ODT) 8 mg disintegrating tablet Take 1 Tab by mouth every eight (8) hours as needed for Nausea. 12 Tab none  fluticasone-salmeterol (ADVAIR DISKUS) 250-50 mcg/dose diskus inhaler Take 1 Puff by inhalation every twelve (12) hours.  omega-3 fatty acids-vitamin e (FISH OIL) 1,000 mg cap Take 1 Cap by mouth.  cyclobenzaprine (FLEXERIL) 10 mg tablet Take  by mouth three (3) times daily as needed.  losartan-hydrochlorothiazide (HYZAAR) 100-25 mg per tablet Take 1 Tab by mouth daily.  ALPRAZolam (XANAX) 0.5 mg tablet Take  by mouth. Past History Past Medical History: 
Past Medical History:  
Diagnosis Date  Abdominal pain  Anxiety  COPD (chronic obstructive pulmonary disease) (HCC)  DJD (degenerative joint disease) of cervical spine  Elevated cholesterol  GERD (gastroesophageal reflux disease) H/O--NOT ON MEDICATION AT PRESENT  
 Hypertension  Nausea and vomiting Past Surgical History: 
Past Surgical History:  
Procedure Laterality Date  EXCISION TUMOR SOFT TISSUE FOOT/TOE SUBQ <1.5CM  2011  
 2 mccartney neuroma right foot  HX CARPAL TUNNEL RELEASE  2002  HX CHOLECYSTECTOMY  10/14/2013  HX HYSTERECTOMY  1994  
 vaginal (ovaries still in) [address]  [place] REMOVAL 1ST/CERVICAL RIB  1984  
 right side 1st rib  REMOVAL OF RIB(S)  1976  
 left side 1st rib Family History: 
Family History Problem Relation Age of Onset  Heart Disease Mother  Hypertension Mother  Diabetes Mother  Hypertension Maternal Aunt  Diabetes Maternal Aunt  Thyroid Disease Sister Social History: 
Social History Substance Use Topics  Smoking status: Former Smoker  Smokeless tobacco: Never Used Comment: stop 4/2013; uses electronic cigarette  Alcohol use No  
 
 
Allergies: Allergies Allergen Reactions  Lisinopril Cough Review of Systems Review of Systems Constitutional: Negative for fever. HENT: Negative for nosebleeds. Eyes: Positive for visual disturbance. Respiratory: Positive for shortness of breath. Cardiovascular: Negative for chest pain. Gastrointestinal: Positive for diarrhea. Negative for blood in stool and vomiting. Genitourinary: Negative for dysuria. Musculoskeletal: Positive for myalgias. Skin: Negative for rash. Neurological: Positive for numbness and headaches. All other systems reviewed and are negative. Visit Vitals  /77  Pulse (!) 109  Temp 98 °F (36.7 °C)  Resp 22  Wt 111.1 kg (245 lb)  SpO2 96%  BMI 40.77 kg/m2 Physical Exam / Medical Decision Making I am the first provider for this patient. I reviewed the vital signs, available nursing notes, past medical history, past surgical history, family history and social history. Vital Signs-Reviewed the patient's vital signs. Physical exam: 
General: Obese, diaphoretic Head:  Normocephalic atraumatic. Eyes:  Pupils 4 mm round reactive  extraocular movements intact. No pallor or conjunctival injection. Nose:  No rhinorrhea, inspection grossly normal.   
Ears:  Grossly normal to inspection, no discharge. Mouth:  Mucous membranes moist, no appreciable intraoral lesion. Neck/Back:  Trachea midline, no asymmetry. Chest:  Grossly normal inspection, symmetric chest rise. Pulmonary:  Clear to auscultation bilaterally no wheezes rhonchi or rales. Cardiovascular:  S1-S2 no murmurs rubs or gallops. Abdomen: Soft, nontender, nondistended no guarding rebound or peritoneal signs. Extremities: In spasm Neurologic:  Alert and oriented no appreciable focal neurologic deficit Skin:  Warm and dry Psychiatric:  Grossly normal mood and affect Nursing note reviewed, vital signs reviewed. ED course: 
Patient presents with whole body spasm, headache acute onset at 3 AM today top of the head no visual changes was recently evaluated for the same headache and spasm with negative head CT. She is seen immediately upon arrival, do not think this is a strokelike syndrome and doubtful seizure but we'll treat with Ativan, consult on neurology CT of the head and monitor here. Diaphoretic tachycardic hypertensive and dilated pupils has a sympathomimetic toxidrome but no exposure by her report, has SSRI but no other change in medication to suggest serotonergic crisis. Patient had 2 mg of Ativan and continues to have significant symptoms tachycardic at 160 hypertensive is given a repeat dose of Ativan and Benadryl Reevaluated at 0540 hrs.: Now more sedated, heart rate still elevated in the 110s, blood pressure normalized saturation normal 
 
CT of the head per radiology motion degraded no clear abnormality Chest x-ray no pneumothorax or infiltrate Monitor sinus tachycardia EKG done at 0509 hrs. sinus tachycardia heart rate 157 intervals within normal limits no clear ST changes seems more like artifact, there is a very prominent and tall R in aVR could be cocaine related. Consult:  Discussed care with Dr. Michael Finn. Standard discussion; including history of patients chief complaint, available diagnostic results, and treatment course. Recommends admission, consider MRI and lumbar puncture if neurologic status does not improve Labs elevated white blood cell count, history of leukocytosis in the past.  His evaluate by hematology cord the prior ER note, hypercalcemia 10.3 troponin negative CK negative alcohol negative, thyroid studies pending Patient's presentation, history, physical exam and laboratory evaluations were reviewed. I felt the patient would benefit from inpatient management and treatment. Consult:  Discussed care with Dr. Carol Kraft. Standard discussion; including history of patients chief complaint, available diagnostic results, and treatment course. Patient was accepted to their service. Patient's daughter reports that she had just started Flexeril, given her other serotonergic agent, this could be serotonin syndrome. She has a heart rate in the 110s, seems more comfortable, will continue with admission Disposition: 
 
Admitted to medicine service Portions of this chart were created with Dragon medical speech to text program.   Unrecognized errors may be present. Diagnostic Study Results Labs - Recent Results (from the past 12 hour(s)) CBC WITH AUTOMATED DIFF Collection Time: 09/09/18  4:45 AM  
Result Value Ref Range WBC 24.1 (H) 4.6 - 13.2 K/uL RBC 5.55 (H) 4.20 - 5.30 M/uL  
 HGB 17.0 (H) 12.0 - 16.0 g/dL HCT 49.9 (H) 35.0 - 45.0 % MCV 89.9 74.0 - 97.0 FL  
 MCH 30.6 24.0 - 34.0 PG  
 MCHC 34.1 31.0 - 37.0 g/dL  
 RDW 13.1 11.6 - 14.5 % PLATELET 663 417 - 240 K/uL MPV 10.6 9.2 - 11.8 FL  
 NEUTROPHILS 28 (L) 42 - 75 % LYMPHOCYTES 59 (H) 20 - 51 % MONOCYTES 9 2 - 9 % EOSINOPHILS 3 0 - 5 % BASOPHILS 0 0 - 3 % MYELOCYTES 1 (H) 0 %  
 ABS. NEUTROPHILS 6.7 1.8 - 8.0 K/UL  
 ABS. LYMPHOCYTES 14.2 (H) 0.8 - 3.5 K/UL  
 ABS. MONOCYTES 2.2 (H) 0 - 1.0 K/UL  
 ABS. EOSINOPHILS 0.7 (H) 0.0 - 0.4 K/UL  
 ABS. BASOPHILS 0.0 0.0 - 0.1 K/UL  
 RBC COMMENTS NORMOCYTIC, NORMOCHROMIC    
 DF MANUAL METABOLIC PANEL, COMPREHENSIVE Collection Time: 09/09/18  4:45 AM  
Result Value Ref Range Sodium 139 136 - 145 mmol/L Potassium 5.0 3.5 - 5.5 mmol/L Chloride 104 100 - 108 mmol/L  
 CO2 21 21 - 32 mmol/L Anion gap 14 3.0 - 18 mmol/L Glucose 162 (H) 74 - 99 mg/dL BUN 19 (H) 7.0 - 18 MG/DL Creatinine 1.49 (H) 0.6 - 1.3 MG/DL  
 BUN/Creatinine ratio 13 12 - 20 GFR est AA 43 (L) >60 ml/min/1.73m2 GFR est non-AA 36 (L) >60 ml/min/1.73m2 Calcium 10.3 (H) 8.5 - 10.1 MG/DL Bilirubin, total 0.7 0.2 - 1.0 MG/DL  
 ALT (SGPT) 33 13 - 56 U/L  
 AST (SGOT) 24 15 - 37 U/L Alk. phosphatase 131 (H) 45 - 117 U/L Protein, total 8.2 6.4 - 8.2 g/dL Albumin 4.4 3.4 - 5.0 g/dL Globulin 3.8 2.0 - 4.0 g/dL A-G Ratio 1.2 0.8 - 1.7    
TROPONIN I Collection Time: 09/09/18  4:45 AM  
Result Value Ref Range Troponin-I, Qt. <0.02 0.0 - 0.045 NG/ML  
PROTHROMBIN TIME + INR Collection Time: 09/09/18  4:45 AM  
Result Value Ref Range Prothrombin time 12.1 11.5 - 15.2 sec INR 0.9 0.8 - 1.2 PTT Collection Time: 09/09/18  4:45 AM  
Result Value Ref Range aPTT 27.7 23.0 - 36.4 SEC  
ETHYL ALCOHOL Collection Time: 09/09/18  4:45 AM  
Result Value Ref Range  ALCOHOL(ETHYL),SERUM <3 0 - 3 MG/DL  
CK  
 Collection Time: 09/09/18  4:45 AM  
Result Value Ref Range CK 68 26 - 192 U/L  
MAGNESIUM Collection Time: 09/09/18  4:45 AM  
Result Value Ref Range Magnesium 2.1 1.6 - 2.6 mg/dL TSH 3RD GENERATION Collection Time: 09/09/18  4:45 AM  
Result Value Ref Range TSH 4.90 (H) 0.36 - 3.74 uIU/mL T4, FREE Collection Time: 09/09/18  4:45 AM  
Result Value Ref Range T4, Free 1.3 0.7 - 1.5 NG/DL  
EKG, 12 LEAD, INITIAL Collection Time: 09/09/18  5:09 AM  
Result Value Ref Range Ventricular Rate 157 BPM  
 Atrial Rate 157 BPM  
 P-R Interval 120 ms QRS Duration 90 ms Q-T Interval 292 ms QTC Calculation (Bezet) 472 ms Calculated R Axis -82 degrees Calculated T Axis 70 degrees Diagnosis Sinus tachycardia Left anterior fascicular block Junctional ST depression, probably normal 
Abnormal ECG When compared with ECG of 12-OCT-2013 09:03, 
Vent. rate has increased BY  97 BPM 
  
URINALYSIS W/ RFLX MICROSCOPIC Collection Time: 09/09/18  6:00 AM  
Result Value Ref Range Color YELLOW Appearance CLEAR Specific gravity 1.011 1.005 - 1.030    
 pH (UA) 5.0 5.0 - 8.0 Protein NEGATIVE  NEG mg/dL Glucose NEGATIVE  NEG mg/dL Ketone NEGATIVE  NEG mg/dL Bilirubin NEGATIVE  NEG Blood NEGATIVE  NEG Urobilinogen 0.2 0.2 - 1.0 EU/dL Nitrites NEGATIVE  NEG Leukocyte Esterase NEGATIVE  NEG    
DRUG SCREEN, URINE Collection Time: 09/09/18  6:00 AM  
Result Value Ref Range BENZODIAZEPINES POSITIVE (A) NEG    
 BARBITURATES NEGATIVE  NEG    
 THC (TH-CANNABINOL) POSITIVE (A) NEG    
 OPIATES NEGATIVE  NEG    
 PCP(PHENCYCLIDINE) NEGATIVE  NEG    
 COCAINE NEGATIVE  NEG    
 AMPHETAMINES NEGATIVE  NEG METHADONE NEGATIVE  NEG HDSCOM (NOTE) EKG, 12 LEAD, SUBSEQUENT Collection Time: 09/09/18  6:26 AM  
Result Value Ref Range Ventricular Rate 133 BPM  
 Atrial Rate 133 BPM  
 P-R Interval 142 ms QRS Duration 84 ms Q-T Interval 300 ms QTC Calculation (Bezet) 446 ms Calculated P Axis 64 degrees Calculated R Axis -91 degrees Calculated T Axis 71 degrees Diagnosis Sinus tachycardia Possible Left atrial enlargement Right superior axis deviation Pulmonary disease pattern RSR' or QR pattern in V1 suggests right ventricular conduction delay Abnormal ECG When compared with ECG of 09-SEP-2018 05:09, No significant change was found Radiologic Studies -  
CT HEAD WO CONT    (Results Pending) XR CHEST PORT    (Results Pending) Diagnosis Clinical Impression: 1. Altered mental status, unspecified altered mental status type Follow-up Information None Patient's Medications Start Taking No medications on file Continue Taking ALPRAZOLAM (XANAX) 0.5 MG TABLET    Take  by mouth. CYCLOBENZAPRINE (FLEXERIL) 10 MG TABLET    Take  by mouth three (3) times daily as needed. ESCITALOPRAM OXALATE (LEXAPRO) 20 MG TABLET    Take 20 mg by mouth daily. FLUTICASONE-SALMETEROL (ADVAIR DISKUS) 250-50 MCG/DOSE DISKUS INHALER    Take 1 Puff by inhalation every twelve (12) hours. HYDROCODONE-ACETAMINOPHEN (NORCO) 5-325 MG PER TABLET    Take 1 Tab by mouth every eight (8) hours as needed for Pain. LOSARTAN-HYDROCHLOROTHIAZIDE (HYZAAR) 100-25 MG PER TABLET    Take 1 Tab by mouth daily. OMEGA-3 FATTY ACIDS-VITAMIN E (FISH OIL) 1,000 MG CAP    Take 1 Cap by mouth. ONDANSETRON (ZOFRAN ODT) 8 MG DISINTEGRATING TABLET    Take 1 Tab by mouth every eight (8) hours as needed for Nausea. PREGABALIN (LYRICA) 50 MG CAPSULE    Take 50 mg by mouth three (3) times daily. These Medications have changed No medications on file Stop Taking No medications on file  
 
_______________________________

## 2018-09-09 NOTE — PROGRESS NOTES
Problem: Discharge Planning Goal: *Discharge to safe environment Outcome: Progressing Towards Goal 
Discharge plan is pending.

## 2018-09-09 NOTE — PROGRESS NOTES
Reason for Admission:   Headache, seizure RRAT Score:    2 Plan for utilizing home health:      TDB. Pending hospital course. Likelihood of Readmission:   Green/low Transition of Care Plan:            Karel Marquez patient's daughter at bedside, Randi Pham, as patient is unable to participate. Verified demographics listed on face sheet; all information correct. Patient PCP is Dr. Valerie Dykes, seen last week . Patient lives alone. Patient's NOK are two daughters - Randi Pham and Fei Patel, both local.  According to the daughters, pt has no POA or advance directive . Patient independent with ADLs prior to admission. No use of DME prior to admission. Discharge plan is pending hospital course, CM will continue to follow. Patient has designated Inder Bermudez participate in his/her discharge plan and to receive any needed information. NOK - two adult daughters Name: Randi Pham Phone number: [phone] Name: Fei Patel Phone number: [phone] Care Management Interventions PCP Verified by CM: Yes (Dr. Valerie Dykes, saw his office last week) Palliative Care Criteria Met (RRAT>21 & CHF Dx)?: No 
Transition of Care Consult (CM Consult): Discharge Planning Current Support Network: Lives Alone Plan discussed with Pt/Family/Caregiver:  Yes

## 2018-09-10 ENCOUNTER — APPOINTMENT (OUTPATIENT)
Dept: CT IMAGING | Age: 58
DRG: 064 | End: 2018-09-10
Attending: PSYCHIATRY & NEUROLOGY
Payer: COMMERCIAL

## 2018-09-10 ENCOUNTER — APPOINTMENT (OUTPATIENT)
Dept: NON INVASIVE DIAGNOSTICS | Age: 58
DRG: 064 | End: 2018-09-10
Attending: HOSPITALIST
Payer: COMMERCIAL

## 2018-09-10 LAB
APPEARANCE UR: CLEAR
ATRIAL RATE: 133 BPM
ATRIAL RATE: 157 BPM
BACTERIA URNS QL MICRO: NEGATIVE /HPF
BASOPHILS # BLD: 0 K/UL (ref 0–0.1)
BASOPHILS NFR BLD: 0 % (ref 0–2)
BILIRUB UR QL: NEGATIVE
CALCULATED P AXIS, ECG09: 64 DEGREES
CALCULATED R AXIS, ECG10: -82 DEGREES
CALCULATED R AXIS, ECG10: -91 DEGREES
CALCULATED T AXIS, ECG11: 70 DEGREES
CALCULATED T AXIS, ECG11: 71 DEGREES
COLOR UR: YELLOW
DIAGNOSIS, 93000: NORMAL
DIAGNOSIS, 93000: NORMAL
DIFFERENTIAL METHOD BLD: ABNORMAL
EOSINOPHIL # BLD: 0.1 K/UL (ref 0–0.4)
EOSINOPHIL NFR BLD: 1 % (ref 0–5)
EPITH CASTS URNS QL MICRO: NORMAL /LPF (ref 0–5)
ERYTHROCYTE [DISTWIDTH] IN BLOOD BY AUTOMATED COUNT: 13.5 % (ref 11.6–14.5)
GLUCOSE BLD STRIP.AUTO-MCNC: 134 MG/DL (ref 70–110)
GLUCOSE UR STRIP.AUTO-MCNC: 100 MG/DL
HCT VFR BLD AUTO: 38.5 % (ref 35–45)
HGB BLD-MCNC: 12.9 G/DL (ref 12–16)
HGB UR QL STRIP: NEGATIVE
KETONES UR QL STRIP.AUTO: ABNORMAL MG/DL
LEUKOCYTE ESTERASE UR QL STRIP.AUTO: ABNORMAL
LYMPHOCYTES # BLD: 4 K/UL (ref 0.9–3.6)
LYMPHOCYTES NFR BLD: 23 % (ref 21–52)
MCH RBC QN AUTO: 30 PG (ref 24–34)
MCHC RBC AUTO-ENTMCNC: 33.5 G/DL (ref 31–37)
MCV RBC AUTO: 89.5 FL (ref 74–97)
MONOCYTES # BLD: 1.2 K/UL (ref 0.05–1.2)
MONOCYTES NFR BLD: 7 % (ref 3–10)
NEUTS SEG # BLD: 12 K/UL (ref 1.8–8)
NEUTS SEG NFR BLD: 69 % (ref 40–73)
NITRITE UR QL STRIP.AUTO: NEGATIVE
P-R INTERVAL, ECG05: 120 MS
P-R INTERVAL, ECG05: 142 MS
PERIPHERAL SMEAR,PSM: NORMAL
PH UR STRIP: 7 [PH] (ref 5–8)
PLATELET # BLD AUTO: 275 K/UL (ref 135–420)
PMV BLD AUTO: 10.1 FL (ref 9.2–11.8)
PROT UR STRIP-MCNC: NEGATIVE MG/DL
Q-T INTERVAL, ECG07: 292 MS
Q-T INTERVAL, ECG07: 300 MS
QRS DURATION, ECG06: 84 MS
QRS DURATION, ECG06: 90 MS
QTC CALCULATION (BEZET), ECG08: 446 MS
QTC CALCULATION (BEZET), ECG08: 472 MS
RBC # BLD AUTO: 4.3 M/UL (ref 4.2–5.3)
RBC #/AREA URNS HPF: NEGATIVE /HPF (ref 0–5)
SP GR UR REFRACTOMETRY: 1.02 (ref 1–1.03)
UROBILINOGEN UR QL STRIP.AUTO: 0.2 EU/DL (ref 0.2–1)
VENTRICULAR RATE, ECG03: 133 BPM
VENTRICULAR RATE, ECG03: 157 BPM
WBC # BLD AUTO: 17.4 K/UL (ref 4.6–13.2)
WBC URNS QL MICRO: NORMAL /HPF (ref 0–4)

## 2018-09-10 PROCEDURE — 70496 CT ANGIOGRAPHY HEAD: CPT

## 2018-09-10 PROCEDURE — 82962 GLUCOSE BLOOD TEST: CPT

## 2018-09-10 PROCEDURE — 74011000258 HC RX REV CODE- 258: Performed by: HOSPITALIST

## 2018-09-10 PROCEDURE — 95822 EEG COMA OR SLEEP ONLY: CPT

## 2018-09-10 PROCEDURE — 74011000258 HC RX REV CODE- 258: Performed by: PSYCHIATRY & NEUROLOGY

## 2018-09-10 PROCEDURE — 74011250637 HC RX REV CODE- 250/637: Performed by: NURSE PRACTITIONER

## 2018-09-10 PROCEDURE — 74011250636 HC RX REV CODE- 250/636: Performed by: PSYCHIATRY & NEUROLOGY

## 2018-09-10 PROCEDURE — 77030037878 HC DRSG MEPILEX >48IN BORD MOLN -B

## 2018-09-10 PROCEDURE — 77030011256 HC DRSG MEPILEX <16IN NO BORD MOLN -A

## 2018-09-10 PROCEDURE — C9113 INJ PANTOPRAZOLE SODIUM, VIA: HCPCS | Performed by: INTERNAL MEDICINE

## 2018-09-10 PROCEDURE — 95816 EEG AWAKE AND DROWSY: CPT | Performed by: PSYCHIATRY & NEUROLOGY

## 2018-09-10 PROCEDURE — 74011250636 HC RX REV CODE- 250/636: Performed by: HOSPITALIST

## 2018-09-10 PROCEDURE — 94640 AIRWAY INHALATION TREATMENT: CPT

## 2018-09-10 PROCEDURE — 65610000009 HC RM ICU NEURO

## 2018-09-10 PROCEDURE — 94762 N-INVAS EAR/PLS OXIMTRY CONT: CPT

## 2018-09-10 PROCEDURE — 74011250636 HC RX REV CODE- 250/636

## 2018-09-10 PROCEDURE — 85025 COMPLETE CBC W/AUTO DIFF WBC: CPT | Performed by: INTERNAL MEDICINE

## 2018-09-10 PROCEDURE — 74011000250 HC RX REV CODE- 250: Performed by: PSYCHIATRY & NEUROLOGY

## 2018-09-10 PROCEDURE — 77010033678 HC OXYGEN DAILY

## 2018-09-10 PROCEDURE — 74011636320 HC RX REV CODE- 636/320: Performed by: HOSPITALIST

## 2018-09-10 PROCEDURE — 74011250636 HC RX REV CODE- 250/636: Performed by: NURSE PRACTITIONER

## 2018-09-10 PROCEDURE — 94664 DEMO&/EVAL PT USE INHALER: CPT

## 2018-09-10 PROCEDURE — 36415 COLL VENOUS BLD VENIPUNCTURE: CPT | Performed by: INTERNAL MEDICINE

## 2018-09-10 PROCEDURE — 74011250636 HC RX REV CODE- 250/636: Performed by: INTERNAL MEDICINE

## 2018-09-10 PROCEDURE — 74011000250 HC RX REV CODE- 250: Performed by: INTERNAL MEDICINE

## 2018-09-10 RX ORDER — HYDROCHLOROTHIAZIDE 25 MG/1
25 TABLET ORAL DAILY
Status: DISCONTINUED | OUTPATIENT
Start: 2018-09-11 | End: 2018-09-10

## 2018-09-10 RX ORDER — HYDRALAZINE HYDROCHLORIDE 20 MG/ML
10 INJECTION INTRAMUSCULAR; INTRAVENOUS
Status: DISCONTINUED | OUTPATIENT
Start: 2018-09-10 | End: 2018-09-10

## 2018-09-10 RX ORDER — HYDROCHLOROTHIAZIDE 25 MG/1
25 TABLET ORAL DAILY
Status: DISCONTINUED | OUTPATIENT
Start: 2018-09-10 | End: 2018-09-13

## 2018-09-10 RX ORDER — SODIUM CHLORIDE 9 MG/ML
500 INJECTION, SOLUTION INTRAVENOUS ONCE
Status: COMPLETED | OUTPATIENT
Start: 2018-09-10 | End: 2018-09-10

## 2018-09-10 RX ORDER — SODIUM CHLORIDE 9 MG/ML
100 INJECTION, SOLUTION INTRAVENOUS ONCE
Status: COMPLETED | OUTPATIENT
Start: 2018-09-10 | End: 2018-09-10

## 2018-09-10 RX ORDER — PHENOBARBITAL SODIUM 130 MG/ML
75 INJECTION INTRAMUSCULAR EVERY EVENING
Status: DISCONTINUED | OUTPATIENT
Start: 2018-09-10 | End: 2018-09-11

## 2018-09-10 RX ORDER — LOSARTAN POTASSIUM 50 MG/1
100 TABLET ORAL DAILY
Status: DISCONTINUED | OUTPATIENT
Start: 2018-09-10 | End: 2018-09-10

## 2018-09-10 RX ORDER — HYDRALAZINE HYDROCHLORIDE 20 MG/ML
20 INJECTION INTRAMUSCULAR; INTRAVENOUS
Status: DISCONTINUED | OUTPATIENT
Start: 2018-09-10 | End: 2018-09-18 | Stop reason: HOSPADM

## 2018-09-10 RX ORDER — LABETALOL HCL 20 MG/4 ML
SYRINGE (ML) INTRAVENOUS
Status: DISPENSED
Start: 2018-09-10 | End: 2018-09-10

## 2018-09-10 RX ORDER — HYDRALAZINE HYDROCHLORIDE 20 MG/ML
INJECTION INTRAMUSCULAR; INTRAVENOUS
Status: COMPLETED
Start: 2018-09-10 | End: 2018-09-10

## 2018-09-10 RX ORDER — LABETALOL HCL 20 MG/4 ML
20 SYRINGE (ML) INTRAVENOUS
Status: COMPLETED | OUTPATIENT
Start: 2018-09-10 | End: 2018-09-10

## 2018-09-10 RX ADMIN — SODIUM CHLORIDE 750 MG: 900 INJECTION, SOLUTION INTRAVENOUS at 19:51

## 2018-09-10 RX ADMIN — Medication 10 ML: at 13:53

## 2018-09-10 RX ADMIN — SODIUM CHLORIDE 750 MG: 900 INJECTION, SOLUTION INTRAVENOUS at 02:07

## 2018-09-10 RX ADMIN — ACYCLOVIR SODIUM 570 MG: 500 INJECTION, POWDER, LYOPHILIZED, FOR SOLUTION INTRAVENOUS at 13:53

## 2018-09-10 RX ADMIN — BUDESONIDE 500 MCG: 0.5 INHALANT RESPIRATORY (INHALATION) at 07:45

## 2018-09-10 RX ADMIN — LABETALOL 20 MG/4 ML (5 MG/ML) INTRAVENOUS SYRINGE 20 MG: at 05:38

## 2018-09-10 RX ADMIN — HYDRALAZINE HYDROCHLORIDE 20 MG: 20 INJECTION INTRAMUSCULAR; INTRAVENOUS at 10:44

## 2018-09-10 RX ADMIN — LABETALOL 20 MG/4 ML (5 MG/ML) INTRAVENOUS SYRINGE 20 MG: at 02:38

## 2018-09-10 RX ADMIN — Medication 10 ML: at 06:14

## 2018-09-10 RX ADMIN — SODIUM CHLORIDE 750 MG: 900 INJECTION, SOLUTION INTRAVENOUS at 08:05

## 2018-09-10 RX ADMIN — BUDESONIDE 500 MCG: 0.5 INHALANT RESPIRATORY (INHALATION) at 20:02

## 2018-09-10 RX ADMIN — HYDRALAZINE HYDROCHLORIDE 20 MG: 20 INJECTION INTRAMUSCULAR; INTRAVENOUS at 15:34

## 2018-09-10 RX ADMIN — IOPAMIDOL 71 ML: 755 INJECTION, SOLUTION INTRAVENOUS at 15:01

## 2018-09-10 RX ADMIN — ARFORMOTEROL TARTRATE 15 MCG: 15 SOLUTION RESPIRATORY (INHALATION) at 07:45

## 2018-09-10 RX ADMIN — SODIUM CHLORIDE 40 MG: 9 INJECTION, SOLUTION INTRAMUSCULAR; INTRAVENOUS; SUBCUTANEOUS at 08:11

## 2018-09-10 RX ADMIN — ACYCLOVIR SODIUM 570 MG: 500 INJECTION, POWDER, LYOPHILIZED, FOR SOLUTION INTRAVENOUS at 06:14

## 2018-09-10 RX ADMIN — ARFORMOTEROL TARTRATE 15 MCG: 15 SOLUTION RESPIRATORY (INHALATION) at 20:02

## 2018-09-10 RX ADMIN — PHENOBARBITAL SODIUM 75 MG: 130 INJECTION INTRAMUSCULAR; INTRAVENOUS at 18:22

## 2018-09-10 RX ADMIN — SODIUM CHLORIDE 500 ML: 900 INJECTION, SOLUTION INTRAVENOUS at 17:00

## 2018-09-10 RX ADMIN — HYDRALAZINE HYDROCHLORIDE 10 MG: 20 INJECTION INTRAMUSCULAR; INTRAVENOUS at 09:05

## 2018-09-10 RX ADMIN — DEXTROSE MONOHYDRATE AND SODIUM CHLORIDE 100 ML/HR: 5; .9 INJECTION, SOLUTION INTRAVENOUS at 00:24

## 2018-09-10 RX ADMIN — LABETALOL 20 MG/4 ML (5 MG/ML) INTRAVENOUS SYRINGE 20 MG: at 16:34

## 2018-09-10 RX ADMIN — SODIUM CHLORIDE 750 MG: 900 INJECTION, SOLUTION INTRAVENOUS at 13:53

## 2018-09-10 RX ADMIN — SODIUM CHLORIDE 72 ML: 900 INJECTION, SOLUTION INTRAVENOUS at 15:02

## 2018-09-10 RX ADMIN — LABETALOL 20 MG/4 ML (5 MG/ML) INTRAVENOUS SYRINGE 20 MG: at 07:59

## 2018-09-10 RX ADMIN — HYDROCHLOROTHIAZIDE 25 MG: 25 TABLET ORAL at 11:39

## 2018-09-10 NOTE — PROGRESS NOTES
[address] [place], [place] [address]ist Division Inpatient Daily Progress Note Patient: Stephanie Lemus MRN: [id number]  CSN: [id number] YOB: 1960  Age: 60 y.o. Sex: female DOA: 9/9/2018 LOS:  LOS: 1 day Chief Complaint:  H/A, muscle spasms Interval History: PMHx of COPD, anxiety, HTN, GERD; presented to ED with c/o severe H/A and muscle spasms. Pt was stable in ED with teleneuro consult with plans for observation and MRI. Pt started to have actively seizure w/ tonic-clonic activity and eye deviation to the left. 8mg of ativan given, but refractory. Keppra load refractory. Depakote load of 1500mg; 1500 mg PB. Neurology consulted. LP completed-CSF negative tubes 1&4. CT head negative. History of isolated WBCs. Recently c/o left sided numbness per pt's daughter-had been prescribed lyrica and flexeril. Takes xanax nightly. No h/o seizure. SBP elevated in ED. MRI c/w small area of subarachnoid hemorrhage on cortical surface right frontal lobe plus patchy brain edema, possible 2/2 malignant hypertension. 9/10/18: EEG completed-results pending. Family updated. SBP < 140-prn increased; added cozaar. Await neurology recommendations. Need to address feeding-TF? -will defer ICU. Subjective:  
  
Family at bedside. Pt moans, but no command following. Objective:  
  
Visit Vitals  /68  Pulse 86  Temp 99.1 °F (37.3 °C)  Resp 22  Wt 104.1 kg (229 lb 8 oz)  SpO2 97%  BMI 38.19 kg/m2 Physical Exam: 
General appearance: eyes closed, moans to voice Lungs: clear to auscultation bilaterally Heart: regular rate and rhythm, S1, S2 normal 
Abdomen: soft, non tender, non distended. Normoactive bowel sounds. Extremities: extremities normal, atraumatic, no cyanosis or edema Skin: Skin color, texture, turgor normal. No rashes or lesions Neurologic: No command following, PERRL-2mm Intake and Output: 
Current Shift:  09/10 0701 - 09/10 1900 In: 300 [I.V.:300] Out: 100 [Urine:100] Last three shifts:  09/08 1901 - 09/10 0700 In: 9420 [I.V.:2593] Out: Phuc Augustin [[id number]] Recent Results (from the past 24 hour(s)) CELL COUNT, CSF Collection Time: 09/09/18 10:58 AM  
Result Value Ref Range CSF TUBE NO. 3    
 CSF COLOR COLORLESS   
 SPUN COLOR COLORLESS   
 CSF APPEARANCE CLEAR   
 CSF RBCS 21 (H) 0 /cu mm  
 CSF WBCS 0 <5 /cu mm CELL COUNT, CSF Collection Time: 09/09/18 10:58 AM  
Result Value Ref Range CSF TUBE NO. 4    
 CSF COLOR COLORLESS   
 SPUN COLOR COLORLESS   
 CSF APPEARANCE CLEAR   
 CSF RBCS 111 (H) 0 /cu mm  
 CSF WBCS 0 <5 /cu mm CRYPTOCOCCAL AG, CSF W/REFLEX TITER Collection Time: 09/09/18 10:58 AM  
Result Value Ref Range Cryptococcus Ag, CSF NEGATIVE CULTURE, CSF W GRAM STAIN Collection Time: 09/09/18 10:58 AM  
Result Value Ref Range Special Requests: NO SPECIAL REQUESTS    
 GRAM STAIN NO WBC'S SEEN    
 GRAM STAIN NO ORGANISMS SEEN Culture result: NO GROWTH AFTER 20 HOURS    
CULTURE, FUNGUS Collection Time: 09/09/18 10:58 AM  
Result Value Ref Range Special Requests: NO SPECIAL REQUESTS FUNGUS SMEAR NO FUNGAL ELEMENTS SEEN Culture result: PENDING   
GLUCOSE, CSF Collection Time: 09/09/18 10:58 AM  
Result Value Ref Range Tube No. 4    
 Glucose,CSF 71 (H) 40 - 70 MG/DL PROTEIN, CSF Collection Time: 09/09/18 10:58 AM  
Result Value Ref Range Tube No. 4    
 Protein,CSF 40 15 - 45 MG/DL  
VALPROIC ACID Collection Time: 09/09/18  2:36 PM  
Result Value Ref Range Valproic acid 64 50 - 100 ug/ml URINALYSIS W/ RFLX MICROSCOPIC Collection Time: 09/09/18 11:20 PM  
Result Value Ref Range Color YELLOW Appearance CLEAR Specific gravity 1.018 1.005 - 1.030    
 pH (UA) 7.0 5.0 - 8.0 Protein NEGATIVE  NEG mg/dL Glucose 100 (A) NEG mg/dL Ketone TRACE (A) NEG mg/dL Bilirubin NEGATIVE  NEG Blood NEGATIVE  NEG Urobilinogen 0.2 0.2 - 1.0 EU/dL Nitrites NEGATIVE  NEG Leukocyte Esterase TRACE (A) NEG URINE MICROSCOPIC ONLY Collection Time: 09/09/18 11:20 PM  
Result Value Ref Range WBC 0 to 3 0 - 4 /hpf  
 RBC NEGATIVE  0 - 5 /hpf Epithelial cells FEW 0 - 5 /lpf Bacteria NEGATIVE  NEG /hpf  
CBC WITH AUTOMATED DIFF Collection Time: 09/10/18  3:35 AM  
Result Value Ref Range WBC 17.4 (H) 4.6 - 13.2 K/uL  
 RBC 4.30 4.20 - 5.30 M/uL  
 HGB 12.9 12.0 - 16.0 g/dL HCT 38.5 35.0 - 45.0 % MCV 89.5 74.0 - 97.0 FL  
 MCH 30.0 24.0 - 34.0 PG  
 MCHC 33.5 31.0 - 37.0 g/dL  
 RDW 13.5 11.6 - 14.5 % PLATELET 518 841 - 702 K/uL MPV 10.1 9.2 - 11.8 FL  
 NEUTROPHILS 69 40 - 73 % LYMPHOCYTES 23 21 - 52 % MONOCYTES 7 3 - 10 % EOSINOPHILS 1 0 - 5 % BASOPHILS 0 0 - 2 %  
 ABS. NEUTROPHILS 12.0 (H) 1.8 - 8.0 K/UL  
 ABS. LYMPHOCYTES 4.0 (H) 0.9 - 3.6 K/UL  
 ABS. MONOCYTES 1.2 0.05 - 1.2 K/UL  
 ABS. EOSINOPHILS 0.1 0.0 - 0.4 K/UL  
 ABS. BASOPHILS 0.0 0.0 - 0.1 K/UL  
 DF AUTOMATED Lab Results Component Value Date/Time Glucose 162 (H) 09/09/2018 04:45 AM  
 Glucose 147 (H) 09/05/2018 12:55 PM  
 Glucose 113 (H) 10/13/2013 06:09 AM  
 Glucose 120 (H) 10/12/2013 05:40 AM  
 Glucose 112 (H) 10/11/2013 07:58 PM  
  
 
Assessment/Plan:  
 
Patient Active Problem List  
Diagnosis Code  DJD (degenerative joint disease) M19.90  
 HTN (hypertension) I10  
 COPD (chronic obstructive pulmonary disease) ([facility] J44.9  Status post laparoscopic cholecystectomy Z90.49  Tachycardia R00.0  Status epilepticus ([facility] G40.901  Anxiety F41.9  GERD (gastroesophageal reflux disease) K21.9  Acute kidney failure (HCC) N17.9  Erythrocytosis D75.1  Lymphocytosis D72.820  
 Positive urine drug screen R82.5  Postictal state ([facility] R56.9 A/P: 
 
Seizure Activity -neuro following-appreciate  
-PB, depakote -seizure precautions 
-CT head negative -EEG completed 9/10-results pending  
-MRI c/w mall area of subarachnoid hemorrhage on cortical surface right frontal lobe plus patchy brain edema, possible 2/2 malignant hypertension. -ativan prn  
-LP completed-CSF tubes 1 & 4 negative ; acyclovir prophlactically  
-will await further recommendations from neurology -ECHO pending Subarachnoid bleed 
-neurology following  
-MRI showed 
-SBP < 140  
-ECHO pending HTN 
-keep SBP < 140  
-increased prn, added scheduled hydrochlorathiazide  
-hold cozaar until kidney numbers improve COPD 
-PCCM following 
-pulmicort/brovana 
-monitor sats/airway (pt high risk for intubation w/ neuro status) GERD  
-protonix DVT prophylaxis 
-SCDs GI-protonix Melissa Dante Need to address feeding status SANGEETHA Núñez, NP-C [address] [place], [place] [address] Hospitalist Division IUORB:[id number] Office:  [phone]

## 2018-09-10 NOTE — PROGRESS NOTES
Physical Exam  
Skin: Skin is warm, dry and intact. Primary Nurse Jesus Flowers RN and Emmanuel Andino RN performed a dual skin assessment on this patient No impairment noted.

## 2018-09-10 NOTE — PROGRESS NOTES
Problem: Falls - Risk of 
Goal: *Absence of Falls Document Arielle Herrera Fall Risk and appropriate interventions in the flowsheet. Outcome: Progressing Towards Goal 
Fall Risk Interventions: 
  
 
  
 
Medication Interventions: Assess postural VS orthostatic hypotension, Bed/chair exit alarm, Evaluate medications/consider consulting pharmacy Elimination Interventions: Bed/chair exit alarm, Call light in reach, Elevated toilet seat Problem: Pressure Injury - Risk of 
Goal: *Prevention of pressure injury Document Glenn Scale and appropriate interventions in the flowsheet. Outcome: Progressing Towards Goal 
Pressure Injury Interventions:

## 2018-09-10 NOTE — PROGRESS NOTES
conducted a Follow up consultation and Spiritual Assessment for Daniel Apodaca, who is a 60 y.o.,female. Patient was resting. Patient's sister, daughter & her  were present at bedside.  was unable to assess patient.  offered spiritual support to family members.  also offered prayer and assurance of continued prayer for patient. Chaplains will continue to follow and provide pastoral care as needed or requested.  recommends bedside caregivers page  on duty if patient shows signs of acute spiritual or emotional distress. The Rev. Batsheva Messenger [address] Spiritual Care Services [address] [facility] [facility] [phone] / [facility] [phone]

## 2018-09-10 NOTE — PROGRESS NOTES
Progress Note Patient: Chika Johnson MRN: [id number]  SSN: [id number] YOB: 1960  Age: 60 y.o. Sex: female Admit Date: 9/9/2018 LOS: 1 day Subjective:  
 
61 y/o female admitted with right frontal seizures (shaking of left arm and leg, followed by GTC seizures), which ceased after she was loaded with Depakote and phenobarbital.  MRI of the brain showed a tiny subarachnoid hemorrhage on the right frontal cortical surface and some spotty edema, possibly due to malignant HTN. The patient reportedly had an SBP > 200 mmHg on admission. CSF studies were normal. 
 
9/5/18 CT head: nothing acute. 9/9/18 CT head: limited by motion artifact; nothing acute. 9/10/18 MRI brain with contrast: changes consistent with PRES. Minimal subarachnoid hemorrhage ghigh right frontal convexity. 9/10/18 MRA of the COW: motion degraded. Possible stenoses left MCA branches but high likelihood of artifact. Labs: creat 1.49. Depakote level 64 on 9/9/18. UDS positive for benzodiazepines and THC. CSF: Gram stain negative, bacterial culture negative so far. Fungal prep negative. Cryptococcal antigen negative. Tube 1: 0 WBCs and 21 RBCs. Tube 4: 0 WBCs and 111 RBCs. Protein 40, glucose 71. Pending CSF studies:  Paraneoplastic profile, NMDA receptor antibodies, HSV 1-2 PCR. Meds:  Acyclovir, phenobarbital 130 mg IV qhs, Depacon 750 mg IV qid. Objective:  
 
Vitals:  
 09/10/18 1100 09/10/18 1200 09/10/18 1230 09/10/18 1300 BP: (!) 133/104 132/77 123/54 131/63 Pulse: 97 99 95 96 Resp: 21 26 25 25 Temp:      
SpO2: 95% 95% 96% 95% Weight:      
Height:      
  
 
Physical Exam:  
Lying in bed, NG tube in. Patient moaned to sternal rub, but did not localize it. PERRL, but did not resist passive eye opening or blink to visual threat. Gaze conjugate and midline. Grimace appeared symmetric. She did not move the extremities to command. Plantar reflexes downgoing bilaterally. Moved legs slightly to noxious stim. Lab/Data Review: 
Recent Results (from the past 48 hour(s)) CBC WITH AUTOMATED DIFF Collection Time: 09/09/18  4:45 AM  
Result Value Ref Range WBC 24.1 (H) 4.6 - 13.2 K/uL  
 RBC 5.55 (H) 4.20 - 5.30 M/uL  
 HGB 17.0 (H) 12.0 - 16.0 g/dL HCT 49.9 (H) 35.0 - 45.0 % MCV 89.9 74.0 - 97.0 FL  
 MCH 30.6 24.0 - 34.0 PG  
 MCHC 34.1 31.0 - 37.0 g/dL  
 RDW 13.1 11.6 - 14.5 % PLATELET 920 049 - 774 K/uL MPV 10.6 9.2 - 11.8 FL  
 NEUTROPHILS 28 (L) 42 - 75 % LYMPHOCYTES 59 (H) 20 - 51 % MONOCYTES 9 2 - 9 % EOSINOPHILS 3 0 - 5 % BASOPHILS 0 0 - 3 % MYELOCYTES 1 (H) 0 %  
 ABS. NEUTROPHILS 6.7 1.8 - 8.0 K/UL  
 ABS. LYMPHOCYTES 14.2 (H) 0.8 - 3.5 K/UL  
 ABS. MONOCYTES 2.2 (H) 0 - 1.0 K/UL  
 ABS. EOSINOPHILS 0.7 (H) 0.0 - 0.4 K/UL  
 ABS. BASOPHILS 0.0 0.0 - 0.1 K/UL  
 RBC COMMENTS NORMOCYTIC, NORMOCHROMIC    
 DF MANUAL METABOLIC PANEL, COMPREHENSIVE Collection Time: 09/09/18  4:45 AM  
Result Value Ref Range Sodium 139 136 - 145 mmol/L Potassium 5.0 3.5 - 5.5 mmol/L Chloride 104 100 - 108 mmol/L  
 CO2 21 21 - 32 mmol/L Anion gap 14 3.0 - 18 mmol/L Glucose 162 (H) 74 - 99 mg/dL BUN 19 (H) 7.0 - 18 MG/DL Creatinine 1.49 (H) 0.6 - 1.3 MG/DL  
 BUN/Creatinine ratio 13 12 - 20 GFR est AA 43 (L) >60 ml/min/1.73m2 GFR est non-AA 36 (L) >60 ml/min/1.73m2 Calcium 10.3 (H) 8.5 - 10.1 MG/DL Bilirubin, total 0.7 0.2 - 1.0 MG/DL  
 ALT (SGPT) 33 13 - 56 U/L  
 AST (SGOT) 24 15 - 37 U/L Alk. phosphatase 131 (H) 45 - 117 U/L Protein, total 8.2 6.4 - 8.2 g/dL Albumin 4.4 3.4 - 5.0 g/dL Globulin 3.8 2.0 - 4.0 g/dL A-G Ratio 1.2 0.8 - 1.7    
TROPONIN I Collection Time: 09/09/18  4:45 AM  
Result Value Ref Range Troponin-I, Qt. <0.02 0.0 - 0.045 NG/ML  
PROTHROMBIN TIME + INR  Collection Time: 09/09/18  4:45 AM  
 Result Value Ref Range Prothrombin time 12.1 11.5 - 15.2 sec INR 0.9 0.8 - 1.2 PTT Collection Time: 09/09/18  4:45 AM  
Result Value Ref Range aPTT 27.7 23.0 - 36.4 SEC  
ETHYL ALCOHOL Collection Time: 09/09/18  4:45 AM  
Result Value Ref Range ALCOHOL(ETHYL),SERUM <3 0 - 3 MG/DL  
CK Collection Time: 09/09/18  4:45 AM  
Result Value Ref Range CK 68 26 - 192 U/L  
MAGNESIUM Collection Time: 09/09/18  4:45 AM  
Result Value Ref Range Magnesium 2.1 1.6 - 2.6 mg/dL TSH 3RD GENERATION Collection Time: 09/09/18  4:45 AM  
Result Value Ref Range TSH 4.90 (H) 0.36 - 3.74 uIU/mL T4, FREE Collection Time: 09/09/18  4:45 AM  
Result Value Ref Range T4, Free 1.3 0.7 - 1.5 NG/DL  
EKG, 12 LEAD, INITIAL Collection Time: 09/09/18  5:09 AM  
Result Value Ref Range Ventricular Rate 157 BPM  
 Atrial Rate 157 BPM  
 P-R Interval 120 ms QRS Duration 90 ms Q-T Interval 292 ms QTC Calculation (Bezet) 472 ms Calculated R Axis -82 degrees Calculated T Axis 70 degrees Diagnosis Sinus tachycardia Left anterior fascicular block Abnormal ECG When compared with ECG of 12-OCT-2013 09:03, 
Vent. rate has increased BY  97 BPM 
Confirmed by Holly Vizcaino ([id number]) on 9/10/2018 12:02:45 PM 
  
URINALYSIS W/ RFLX MICROSCOPIC Collection Time: 09/09/18  6:00 AM  
Result Value Ref Range Color YELLOW Appearance CLEAR Specific gravity 1.011 1.005 - 1.030    
 pH (UA) 5.0 5.0 - 8.0 Protein NEGATIVE  NEG mg/dL Glucose NEGATIVE  NEG mg/dL Ketone NEGATIVE  NEG mg/dL Bilirubin NEGATIVE  NEG Blood NEGATIVE  NEG Urobilinogen 0.2 0.2 - 1.0 EU/dL Nitrites NEGATIVE  NEG Leukocyte Esterase NEGATIVE  NEG    
DRUG SCREEN, URINE Collection Time: 09/09/18  6:00 AM  
Result Value Ref Range  BENZODIAZEPINES POSITIVE (A) NEG    
 BARBITURATES NEGATIVE  NEG    
 THC (TH-CANNABINOL) POSITIVE (A) NEG    
 OPIATES NEGATIVE  NEG    
 PCP(PHENCYCLIDINE) NEGATIVE  NEG    
 COCAINE NEGATIVE  NEG    
 AMPHETAMINES NEGATIVE  NEG METHADONE NEGATIVE  NEG HDSCOM (NOTE) EKG, 12 LEAD, SUBSEQUENT Collection Time: 09/09/18  6:26 AM  
Result Value Ref Range Ventricular Rate 133 BPM  
 Atrial Rate 133 BPM  
 P-R Interval 142 ms QRS Duration 84 ms Q-T Interval 300 ms QTC Calculation (Bezet) 446 ms Calculated P Axis 64 degrees Calculated R Axis -91 degrees Calculated T Axis 71 degrees Diagnosis Sinus tachycardia Possible Left atrial enlargement Right superior axis deviation Pulmonary disease pattern RSR' or QR pattern in V1 suggests right ventricular conduction delay Abnormal ECG When compared with ECG of 09-SEP-2018 05:09, No significant change was found CELL COUNT, CSF Collection Time: 09/09/18 10:58 AM  
Result Value Ref Range CSF TUBE NO. 3    
 CSF COLOR COLORLESS   
 SPUN COLOR COLORLESS   
 CSF APPEARANCE CLEAR   
 CSF RBCS 21 (H) 0 /cu mm  
 CSF WBCS 0 <5 /cu mm CELL COUNT, CSF Collection Time: 09/09/18 10:58 AM  
Result Value Ref Range CSF TUBE NO. 4    
 CSF COLOR COLORLESS   
 SPUN COLOR COLORLESS   
 CSF APPEARANCE CLEAR   
 CSF RBCS 111 (H) 0 /cu mm  
 CSF WBCS 0 <5 /cu mm CRYPTOCOCCAL AG, CSF W/REFLEX TITER Collection Time: 09/09/18 10:58 AM  
Result Value Ref Range Cryptococcus Ag, CSF NEGATIVE CULTURE, CSF W GRAM STAIN Collection Time: 09/09/18 10:58 AM  
Result Value Ref Range Special Requests: NO SPECIAL REQUESTS    
 GRAM STAIN NO WBC'S SEEN    
 GRAM STAIN NO ORGANISMS SEEN Culture result: NO GROWTH AFTER 20 HOURS    
CULTURE, FUNGUS Collection Time: 09/09/18 10:58 AM  
Result Value Ref Range Special Requests: NO SPECIAL REQUESTS FUNGUS SMEAR NO FUNGAL ELEMENTS SEEN Culture result: PENDING   
GLUCOSE, CSF Collection Time: 09/09/18 10:58 AM  
Result Value Ref Range  Tube No. 4    
 Glucose,CSF 71 (H) 40 - 70 MG/DL  
 PROTEIN, CSF Collection Time: 09/09/18 10:58 AM  
Result Value Ref Range Tube No. 4    
 Protein,CSF 40 15 - 45 MG/DL  
VALPROIC ACID Collection Time: 09/09/18  2:36 PM  
Result Value Ref Range Valproic acid 64 50 - 100 ug/ml URINALYSIS W/ RFLX MICROSCOPIC Collection Time: 09/09/18 11:20 PM  
Result Value Ref Range Color YELLOW Appearance CLEAR Specific gravity 1.018 1.005 - 1.030    
 pH (UA) 7.0 5.0 - 8.0 Protein NEGATIVE  NEG mg/dL Glucose 100 (A) NEG mg/dL Ketone TRACE (A) NEG mg/dL Bilirubin NEGATIVE  NEG Blood NEGATIVE  NEG Urobilinogen 0.2 0.2 - 1.0 EU/dL Nitrites NEGATIVE  NEG Leukocyte Esterase TRACE (A) NEG URINE MICROSCOPIC ONLY Collection Time: 09/09/18 11:20 PM  
Result Value Ref Range WBC 0 to 3 0 - 4 /hpf  
 RBC NEGATIVE  0 - 5 /hpf Epithelial cells FEW 0 - 5 /lpf Bacteria NEGATIVE  NEG /hpf  
CBC WITH AUTOMATED DIFF Collection Time: 09/10/18  3:35 AM  
Result Value Ref Range WBC 17.4 (H) 4.6 - 13.2 K/uL  
 RBC 4.30 4.20 - 5.30 M/uL  
 HGB 12.9 12.0 - 16.0 g/dL HCT 38.5 35.0 - 45.0 % MCV 89.5 74.0 - 97.0 FL  
 MCH 30.0 24.0 - 34.0 PG  
 MCHC 33.5 31.0 - 37.0 g/dL  
 RDW 13.5 11.6 - 14.5 % PLATELET 248 561 - 285 K/uL MPV 10.1 9.2 - 11.8 FL  
 NEUTROPHILS 69 40 - 73 % LYMPHOCYTES 23 21 - 52 % MONOCYTES 7 3 - 10 % EOSINOPHILS 1 0 - 5 % BASOPHILS 0 0 - 2 %  
 ABS. NEUTROPHILS 12.0 (H) 1.8 - 8.0 K/UL  
 ABS. LYMPHOCYTES 4.0 (H) 0.9 - 3.6 K/UL  
 ABS. MONOCYTES 1.2 0.05 - 1.2 K/UL  
 ABS. EOSINOPHILS 0.1 0.0 - 0.4 K/UL  
 ABS. BASOPHILS 0.0 0.0 - 0.1 K/UL  
 DF AUTOMATED    
GLUCOSE, POC Collection Time: 09/10/18 12:50 PM  
Result Value Ref Range Glucose (POC) 134 (H) 70 - 110 mg/dL Assessment:  
 
Principal Problem: 
  Status epilepticus ([facility] (9/9/2018) Active Problems: 
  HTN (hypertension) (9/27/2013) COPD (chronic obstructive pulmonary disease) ([facility] (9/27/2013) Tachycardia (2018) Anxiety (2018) GERD (gastroesophageal reflux disease) (2018) Overview: H/O--NOT ON MEDICATION AT PRESENT Acute kidney failure ([facility] (2018) Erythrocytosis (2018) Lymphocytosis (2018) Positive urine drug screen (2018) Overview: THC Postictal state ([facility] (2018) Plan: 1. MRI findings consistent with PRES, and patient with very high BP on admission. Control BP with goal SBP of 140 mmHg or less. 2.  Small subarachnoid hemorrhage. The patient had severe headaches prior to admission, with onset during a bowel movement. Additionally, the patient's father  of a ruptured cerebral hemorrhage. Therefore, need to check CT angiogram to exclude an aneurysm because the MRA was poor quality. Pt's creatinine rupali into abnormal range today, so will need to keep patient well hydrated. Discussed with patient's nurse. Check follow up BMP in the AM. If the patient suffered an aneurysmal SAH, that might have driven her BP very high. 3.  Seizures. Likely due to PRES but also could be due to [facility]. Will continue current dose of Depacon, start weaning phenobarbital, which is quite sedating. Check levels in the AM.  EEG done this AM: will review. 4.  The patient is on prophylactic acyclovir. Will stop that medication because CSF WBC count was 0, and CSF glucose and protein levels were normal.  This is extremely unlikely if HSV infection is present. Discussed the above with the patient's daughters at some length. Signed By: Madhuri Sinclair MD   
 September 10, 2018

## 2018-09-10 NOTE — DIABETES MGMT
NUTRITIONAL ASSESSMENT GLYCEMIC CONTROL/ PLAN OF CARE Josue Barros           60 y.o.           9/9/2018 1. Altered mental status, unspecified altered mental status type INTERVENTIONS/PLAN:  
Monitor clinical course and feeding status, labs and weights. ASSESSMENT:  
Pt is 183% ideal wt; pt appears well nourished. Nutrition Diagnoses:  
Inadequate oral food and beverage intake due to AMS/s/p seizures as evidenced by NPO status. Obesity due to excess energy intake as evidenced by BMI of 38.2 kg/m2. SUBJECTIVE/OBJECTIVE: Information obtained from: chart review, ICU rounds Pt admitted with seizures, ARF and PMHx including COPD, HTN, GERD, elevated cholesterol. Diet: NPO No data found. Medications: [x]                Reviewed IVF:  D5 NS at 100 ml/hr (408 calories/day) Most Recent POC Glucose:  
Recent Labs  
   09/09/18 
 0445 GLU  162* Labs:  
No results found for: HBA1C, HGBE8, [id number] Lab Results Component Value Date/Time Sodium 139 09/09/2018 04:45 AM  
 Potassium 5.0 09/09/2018 04:45 AM  
 Chloride 104 09/09/2018 04:45 AM  
 CO2 21 09/09/2018 04:45 AM  
 Anion gap 14 09/09/2018 04:45 AM  
 Glucose 162 (H) 09/09/2018 04:45 AM  
 BUN 19 (H) 09/09/2018 04:45 AM  
 Creatinine 1.49 (H) 09/09/2018 04:45 AM  
 Calcium 10.3 (H) 09/09/2018 04:45 AM  
 Magnesium 2.1 09/09/2018 04:45 AM  
 Albumin 4.4 09/09/2018 04:45 AM  
 
 
Anthropometrics: IBW : 56.7 kg (125 lb), % IBW (Calculated): 183.6 %, BMI (calculated): 38.2 Wt Readings from Last 1 Encounters:  
09/10/18 104.1 kg (229 lb 8 oz) Ht Readings from Last 1 Encounters:  
09/10/18 5' 5\" (1.651 m) Estimated Nutrition Needs:  4606 Kcals/day, Protein (g): 85 g Fluid (ml): 1500 ml Based on:   [x]          Actual BW    []          ABW   []            Adjusted BW   
    
 
Nutrition Interventions: 
None at this time- NPO Goal:  
Provision of adequate nutrition by 9/15/18. Weight maintenance (+/- 1-2 kg) or slow, gradual weight loss of 1-2 lbs/week by 9/20/18. Nutrition Monitoring and Evaluation []     Monitor po intake on meal rounds 
[x]     Continue inpatient monitoring and intervention 
[]     Other: 
 
 
Nutrition Risk:  []   High     [x]  Moderate    []  Minimal/Uncompromised Jett Chapa RD, CDE Office:  [phone] [place] Pager:  [phone]

## 2018-09-10 NOTE — PROGRESS NOTES
2000--Assessment completed. Neurovascular assessment completed. Noted to have decorticate posturing of right arm/hand with pressure to nailbeds. All other extremities withdraw to nailbed pressure. VSS. Clean and dry. Daughters at bedside. [place] care done. No distress noted. 2200--No changes noted in neuro status. Clean and dry. Monitoring blood pressure. No distress noted. 2229--Blood pressure 150/66. Labetolol 20mg ivp given per order. Will monitor. 2300--Blood pressure 143/70. Will monitor. No distress noted. 0000---Patient still not following commands but withdrawing from painful stimuli more vigorously. VSS. Clean and dry. No distress noted. 0200--No changes noted. Monitoring blood pressure. 0238--Blood pressure 145/64. Labetolol 20mg ivp given. No distress noted. 0300--Blood pressure 134/62. 0410-- Blood pressure 150/67. Dr. Lucía Cuevas paged. Neuro status unchanged. Clean and dry,. No distress noted. 0430--No return call from Dr. Lucía Cuevas. Called in sleep room and notified of Blood pressure 150/67 and order for labetolol 20mg ivp q4hr for >130/90 and that pt had dose at 2230 and 0230. States to give another dose at 0530. 0538--Labetolol 20mg ivp given for elevated blood pressure. Will monitor. No distress noted. 0600--No changes noted. Clean and dry. No distress noted.

## 2018-09-10 NOTE — PROGRESS NOTES
Physical Exam  
Skin:  
 
  
  
 
Primary Nurse Angelic Robison and Anh Luciano RN performed a dual skin assessment on this patient No impairment noted Glenn score is 20

## 2018-09-10 NOTE — PROGRESS NOTES
Problem: Falls - Risk of 
Goal: *Absence of Falls Document Angélica Hernandez Fall Risk and appropriate interventions in the flowsheet. Outcome: Progressing Towards Goal 
Fall Risk Interventions: 
  
 
  
 
Medication Interventions: Assess postural VS orthostatic hypotension, Evaluate medications/consider consulting pharmacy, Bed/chair exit alarm Elimination Interventions: Bed/chair exit alarm, Call light in reach, Elevated toilet seat

## 2018-09-11 ENCOUNTER — APPOINTMENT (OUTPATIENT)
Dept: GENERAL RADIOLOGY | Age: 58
DRG: 064 | End: 2018-09-11
Attending: INTERNAL MEDICINE
Payer: COMMERCIAL

## 2018-09-11 ENCOUNTER — APPOINTMENT (OUTPATIENT)
Dept: NON INVASIVE DIAGNOSTICS | Age: 58
DRG: 064 | End: 2018-09-11
Attending: HOSPITALIST
Payer: COMMERCIAL

## 2018-09-11 LAB
ANION GAP SERPL CALC-SCNC: 8 MMOL/L (ref 3–18)
BUN SERPL-MCNC: 10 MG/DL (ref 7–18)
BUN/CREAT SERPL: 16 (ref 12–20)
CALCIUM SERPL-MCNC: 8.1 MG/DL (ref 8.5–10.1)
CHLORIDE SERPL-SCNC: 105 MMOL/L (ref 100–108)
CO2 SERPL-SCNC: 30 MMOL/L (ref 21–32)
CREAT SERPL-MCNC: 0.62 MG/DL (ref 0.6–1.3)
ERYTHROCYTE [DISTWIDTH] IN BLOOD BY AUTOMATED COUNT: 13.6 % (ref 11.6–14.5)
GLUCOSE SERPL-MCNC: 91 MG/DL (ref 74–99)
HCT VFR BLD AUTO: 36.7 % (ref 35–45)
HGB BLD-MCNC: 12.4 G/DL (ref 12–16)
HSV1 DNA SPEC QL NAA+PROBE: NEGATIVE
HSV2 DNA SPEC QL NAA+PROBE: NEGATIVE
MAGNESIUM SERPL-MCNC: 1.8 MG/DL (ref 1.6–2.6)
MCH RBC QN AUTO: 30.2 PG (ref 24–34)
MCHC RBC AUTO-ENTMCNC: 33.8 G/DL (ref 31–37)
MCV RBC AUTO: 89.3 FL (ref 74–97)
PHENOBARB SERPL-MCNC: 19.9 MCG/ML (ref 15–35)
PHOSPHATE SERPL-MCNC: 1.5 MG/DL (ref 2.5–4.9)
PLATELET # BLD AUTO: 244 K/UL (ref 135–420)
PMV BLD AUTO: 10.5 FL (ref 9.2–11.8)
POTASSIUM SERPL-SCNC: 3 MMOL/L (ref 3.5–5.5)
RBC # BLD AUTO: 4.11 M/UL (ref 4.2–5.3)
SODIUM SERPL-SCNC: 143 MMOL/L (ref 136–145)
SPECIMEN SOURCE: NORMAL
VALPROATE SERPL-MCNC: 89 UG/ML (ref 50–100)
WBC # BLD AUTO: 16.8 K/UL (ref 4.6–13.2)

## 2018-09-11 PROCEDURE — 80048 BASIC METABOLIC PNL TOTAL CA: CPT | Performed by: NURSE PRACTITIONER

## 2018-09-11 PROCEDURE — 77030011943

## 2018-09-11 PROCEDURE — 74011000258 HC RX REV CODE- 258: Performed by: PSYCHIATRY & NEUROLOGY

## 2018-09-11 PROCEDURE — 36569 INSJ PICC 5 YR+ W/O IMAGING: CPT | Performed by: INTERNAL MEDICINE

## 2018-09-11 PROCEDURE — 77030037870 HC GLD SHT PREVALON SAGE -B

## 2018-09-11 PROCEDURE — 74011250636 HC RX REV CODE- 250/636: Performed by: PSYCHIATRY & NEUROLOGY

## 2018-09-11 PROCEDURE — 51798 US URINE CAPACITY MEASURE: CPT

## 2018-09-11 PROCEDURE — 77010033678 HC OXYGEN DAILY

## 2018-09-11 PROCEDURE — 36415 COLL VENOUS BLD VENIPUNCTURE: CPT | Performed by: NURSE PRACTITIONER

## 2018-09-11 PROCEDURE — C9113 INJ PANTOPRAZOLE SODIUM, VIA: HCPCS | Performed by: INTERNAL MEDICINE

## 2018-09-11 PROCEDURE — 80164 ASSAY DIPROPYLACETIC ACD TOT: CPT | Performed by: PSYCHIATRY & NEUROLOGY

## 2018-09-11 PROCEDURE — 74011250637 HC RX REV CODE- 250/637: Performed by: NURSE PRACTITIONER

## 2018-09-11 PROCEDURE — 74011250636 HC RX REV CODE- 250/636: Performed by: NURSE PRACTITIONER

## 2018-09-11 PROCEDURE — 87040 BLOOD CULTURE FOR BACTERIA: CPT | Performed by: INTERNAL MEDICINE

## 2018-09-11 PROCEDURE — C1751 CATH, INF, PER/CENT/MIDLINE: HCPCS

## 2018-09-11 PROCEDURE — 83735 ASSAY OF MAGNESIUM: CPT | Performed by: HOSPITALIST

## 2018-09-11 PROCEDURE — 94640 AIRWAY INHALATION TREATMENT: CPT

## 2018-09-11 PROCEDURE — 85027 COMPLETE CBC AUTOMATED: CPT | Performed by: NURSE PRACTITIONER

## 2018-09-11 PROCEDURE — 02HV33Z INSERTION OF INFUSION DEVICE INTO SUPERIOR VENA CAVA, PERCUTANEOUS APPROACH: ICD-10-PCS | Performed by: HOSPITALIST

## 2018-09-11 PROCEDURE — 74011000250 HC RX REV CODE- 250: Performed by: INTERNAL MEDICINE

## 2018-09-11 PROCEDURE — 80184 ASSAY OF PHENOBARBITAL: CPT | Performed by: NURSE PRACTITIONER

## 2018-09-11 PROCEDURE — 74011250636 HC RX REV CODE- 250/636: Performed by: INTERNAL MEDICINE

## 2018-09-11 PROCEDURE — C9254 INJECTION, LACOSAMIDE: HCPCS | Performed by: PSYCHIATRY & NEUROLOGY

## 2018-09-11 PROCEDURE — 74011250637 HC RX REV CODE- 250/637: Performed by: HOSPITALIST

## 2018-09-11 PROCEDURE — 65610000009 HC RM ICU NEURO

## 2018-09-11 PROCEDURE — 74011000258 HC RX REV CODE- 258: Performed by: INTERNAL MEDICINE

## 2018-09-11 PROCEDURE — 76937 US GUIDE VASCULAR ACCESS: CPT | Performed by: INTERNAL MEDICINE

## 2018-09-11 PROCEDURE — 77030038269 HC DRN EXT URIN PURWCK BARD -A

## 2018-09-11 PROCEDURE — 74011000250 HC RX REV CODE- 250: Performed by: PSYCHIATRY & NEUROLOGY

## 2018-09-11 PROCEDURE — 77030018719 HC DRSG PTCH ANTIMIC J&J -A

## 2018-09-11 PROCEDURE — 84100 ASSAY OF PHOSPHORUS: CPT | Performed by: HOSPITALIST

## 2018-09-11 RX ORDER — AMLODIPINE BESYLATE 5 MG/1
5 TABLET ORAL DAILY
Status: DISCONTINUED | OUTPATIENT
Start: 2018-09-11 | End: 2018-09-12

## 2018-09-11 RX ORDER — SODIUM CHLORIDE 0.9 % (FLUSH) 0.9 %
10-30 SYRINGE (ML) INJECTION AS NEEDED
Status: DISCONTINUED | OUTPATIENT
Start: 2018-09-11 | End: 2018-09-18 | Stop reason: HOSPADM

## 2018-09-11 RX ORDER — SODIUM CHLORIDE 0.9 % (FLUSH) 0.9 %
10 SYRINGE (ML) INJECTION EVERY 24 HOURS
Status: DISCONTINUED | OUTPATIENT
Start: 2018-09-11 | End: 2018-09-18 | Stop reason: HOSPADM

## 2018-09-11 RX ORDER — IPRATROPIUM BROMIDE AND ALBUTEROL SULFATE 2.5; .5 MG/3ML; MG/3ML
3 SOLUTION RESPIRATORY (INHALATION)
Status: DISCONTINUED | OUTPATIENT
Start: 2018-09-11 | End: 2018-09-18 | Stop reason: HOSPADM

## 2018-09-11 RX ORDER — SODIUM CHLORIDE 0.9 % (FLUSH) 0.9 %
10-40 SYRINGE (ML) INJECTION EVERY 8 HOURS
Status: DISCONTINUED | OUTPATIENT
Start: 2018-09-11 | End: 2018-09-18 | Stop reason: HOSPADM

## 2018-09-11 RX ORDER — BACITRACIN 500 UNIT/G
1 PACKET (EA) TOPICAL AS NEEDED
Status: DISCONTINUED | OUTPATIENT
Start: 2018-09-11 | End: 2018-09-18 | Stop reason: HOSPADM

## 2018-09-11 RX ORDER — SODIUM CHLORIDE 0.9 % (FLUSH) 0.9 %
10 SYRINGE (ML) INJECTION AS NEEDED
Status: DISCONTINUED | OUTPATIENT
Start: 2018-09-11 | End: 2018-09-18 | Stop reason: HOSPADM

## 2018-09-11 RX ORDER — LORAZEPAM 2 MG/ML
1 INJECTION INTRAMUSCULAR AS NEEDED
Status: DISCONTINUED | OUTPATIENT
Start: 2018-09-11 | End: 2018-09-14

## 2018-09-11 RX ADMIN — Medication 10 ML: at 00:58

## 2018-09-11 RX ADMIN — BUDESONIDE 500 MCG: 0.5 INHALANT RESPIRATORY (INHALATION) at 08:02

## 2018-09-11 RX ADMIN — Medication 10 ML: at 14:00

## 2018-09-11 RX ADMIN — HYDROCHLOROTHIAZIDE 25 MG: 25 TABLET ORAL at 09:37

## 2018-09-11 RX ADMIN — LABETALOL 20 MG/4 ML (5 MG/ML) INTRAVENOUS SYRINGE 20 MG: at 22:47

## 2018-09-11 RX ADMIN — LORAZEPAM 1 MG: 2 INJECTION, SOLUTION INTRAMUSCULAR; INTRAVENOUS at 13:32

## 2018-09-11 RX ADMIN — SODIUM CHLORIDE 750 MG: 900 INJECTION, SOLUTION INTRAVENOUS at 09:35

## 2018-09-11 RX ADMIN — AMLODIPINE BESYLATE 5 MG: 5 TABLET ORAL at 14:00

## 2018-09-11 RX ADMIN — SODIUM CHLORIDE 750 MG: 900 INJECTION, SOLUTION INTRAVENOUS at 01:57

## 2018-09-11 RX ADMIN — SODIUM CHLORIDE 40 MG: 9 INJECTION, SOLUTION INTRAMUSCULAR; INTRAVENOUS; SUBCUTANEOUS at 09:35

## 2018-09-11 RX ADMIN — SODIUM CHLORIDE 200 MG: 900 INJECTION, SOLUTION INTRAVENOUS at 17:53

## 2018-09-11 RX ADMIN — SODIUM CHLORIDE 750 MG: 900 INJECTION, SOLUTION INTRAVENOUS at 21:16

## 2018-09-11 RX ADMIN — CEFTRIAXONE 1 G: 1 INJECTION, POWDER, FOR SOLUTION INTRAMUSCULAR; INTRAVENOUS at 13:47

## 2018-09-11 RX ADMIN — ARFORMOTEROL TARTRATE 15 MCG: 15 SOLUTION RESPIRATORY (INHALATION) at 20:14

## 2018-09-11 RX ADMIN — HYDRALAZINE HYDROCHLORIDE 20 MG: 20 INJECTION INTRAMUSCULAR; INTRAVENOUS at 10:48

## 2018-09-11 RX ADMIN — ACETAMINOPHEN 650 MG: 650 SUPPOSITORY RECTAL at 21:16

## 2018-09-11 RX ADMIN — SODIUM CHLORIDE 100 MG: 900 INJECTION, SOLUTION INTRAVENOUS at 22:17

## 2018-09-11 RX ADMIN — SODIUM CHLORIDE 750 MG: 900 INJECTION, SOLUTION INTRAVENOUS at 14:00

## 2018-09-11 RX ADMIN — BUDESONIDE 500 MCG: 0.5 INHALANT RESPIRATORY (INHALATION) at 20:13

## 2018-09-11 RX ADMIN — SODIUM CHLORIDE: 900 INJECTION, SOLUTION INTRAVENOUS at 18:27

## 2018-09-11 RX ADMIN — ARFORMOTEROL TARTRATE 15 MCG: 15 SOLUTION RESPIRATORY (INHALATION) at 08:02

## 2018-09-11 RX ADMIN — Medication 10 ML: at 17:12

## 2018-09-11 NOTE — PROGRESS NOTES
1930 
Bedside SBAR report taken from [place] [id number]. Patient responding to painful stimuli only. Will not open her eyes. Pupils are equal and reactive. Does have spontaneous movements, unsure if they are purposeful. Vitals Stable. NGTube intact and on low intermittent suction with brownish red tent to the secretions it is suctioning. 0100 Have seen patient open her eyes once to painful stimuli. She will follow simple commands of squeezing my hand (but she will only squeeze with her right, no squeezing or purposeful movement seen from her left arm). She will wiggle her toes when asked and she moves her legs up and down the bed and across each other. Had to straight cath patient due to not urinating for over 6 hours. Bladder scan read 545, obtained 600mls when she was cathed. [facility] have been drawn via [facility] 
 
75[address] James Jacobo made aware of Maroon colored secretions from patients NGTube. Despite not being alert, she is very mobile in the bed, to the point of pushing her head into the rails, flinging her arms and legs over the rails and trying to lay onto her stomach. Made MD aware that H/H and platelets are stable, as well as vitals. [facility] Bedside SBAR report given to incoming RN Shivam Dueñas. Neuro and skin check performed together. Patient is waking up more and more. I asked her if she has restless leg syndrome and she said yes. She also says yes to the fact that she has chronic back pain and states the reason for being so restless is because she \"is trying to get comfortable\". She has been all over the bed. The Mepilex that was on her sacrum and heels have all rolled off. She has pulled out two IV's from trying to turn over in the bed. She has been washed up several times and new linens and gowns provided. The only thing that she has not done is pull on her NG tube.   It is still at the 60 maría and the more she is restless in the bed the brighter the maroon secretions get.

## 2018-09-11 NOTE — PROGRESS NOTES
Problem: Falls - Risk of 
Goal: *Absence of Falls Document Liza Newberry Fall Risk and appropriate interventions in the flowsheet. Outcome: Progressing Towards Goal 
Fall Risk Interventions: 
  
 
Mentation Interventions: Adequate sleep, hydration, pain control Medication Interventions: Assess postural VS orthostatic hypotension, Bed/chair exit alarm, Evaluate medications/consider consulting pharmacy Elimination Interventions: Bed/chair exit alarm, Patient to call for help with toileting needs Problem: Pressure Injury - Risk of 
Goal: *Prevention of pressure injury Document Glenn Scale and appropriate interventions in the flowsheet. Outcome: Progressing Towards Goal 
Pressure Injury Interventions: 
Sensory Interventions: Assess changes in LOC, Assess need for specialty bed Moisture Interventions: Absorbent underpads Activity Interventions: Assess need for specialty bed Mobility Interventions: Assess need for specialty bed Nutrition Interventions: Document food/fluid/supplement intake Friction and Shear Interventions: Apply protective barrier, creams and emollients

## 2018-09-11 NOTE — PROGRESS NOTES
[facility] Hospitalist Division Inpatient Daily Progress Note Patient: Robert Olivarez MRN: [id number]  CSN: [id number] YOB: 1960  Age: 60 y.o. Sex: female DOA: 9/9/2018 LOS:  LOS: 2 days Chief Complaint:  H/A, muscle spasms Interval History: PMHx of COPD, anxiety, HTN, GERD; presented to ED with c/o severe H/A and muscle spasms. Pt was stable in ED with teleneuro consult with plans for observation and MRI. Pt started to have actively seizure w/ tonic-clonic activity and eye deviation to the left. 8mg of ativan given, but refractory. Keppra load refractory. Depakote load of 1500mg; 1500 mg PB. Neurology consulted. LP completed-CSF negative tubes 1&4. CT head negative. History of isolated WBCs. Recently c/o left sided numbness per pt's daughter-had been prescribed lyrica and flexeril. Takes xanax nightly. No h/o seizure. SBP elevated in ED. MRI c/w small area of subarachnoid hemorrhage on cortical surface right frontal lobe plus patchy brain edema, possible 2/2 malignant hypertension. UDS + benzo, thc. Fungal prep negative. Cryptococcal antigen negative. Tube 1: 0 WBCs and 21 RBCs. Tube 4: 0 WBCs and 111 RBCs. Protein 40, glucose 71.  
9/10/18: EEG completed-results pending. Family updated. SBP < 140-prn increased; added cozaar. Await neurology recommendations. Need to address feeding-TF? -will defer ICU. Start phenobarbital wean and check levels. Acyclovir stopped 2/2 CSF WBC count 0, CSF glucose and protein levels normal.  
9/11/18: EEG completed-per neurology's reading-Abnormal recording revealing the presence of moderate slowing of the background activity consistent with encephalopathy. This is a nonspecific finding and may be due to toxic, metabolic or inflammatory causes. No epileptiform activity was noted. (per neuro reading).  Following commands today; able to verbalize, open eyes to name. Restless-sitter ordered. Pending CSF studies paraneoplastic profile, NMDA receptor antibodies, HSV1-2 PCR. WBCs w/ slight improvement. Replace K-lytes protocol added. Phenobarbital level check in. SBP < 140 (currently ok). Urinary retention-straight cath X2, bladder scan prn, place ken if needed (but would overall try to avoid w/ elevated white count and unclear etiology?). Will need PT/OT/Speech when improved for discharge planning. Assess feeding-has NG tube. Echo ordered, but not yet completed. Continue to follow neuro status-slowly improving. Subjective:  
  
Family at bedside. Following commands; opens eyes to voice Restless Objective:  
  
Visit Vitals  /51  Pulse 78  Temp 98.1 °F (36.7 °C)  Resp 18  Ht 5' 5\" (1.651 m)  Wt 104.1 kg (229 lb 8 oz)  SpO2 97%  BMI 38.19 kg/m2 Physical Exam: 
General appearance: restless Lungs: clear to auscultation bilaterally Heart: regular rate and rhythm, S1, S2 normal 
Abdomen: soft, non tender, non distended. Normoactive bowel sounds. Extremities: extremities normal, atraumatic, no cyanosis or edema Skin: Skin color, texture, turgor normal.  
Neurologic: Follows commands, PERRL-2mm, opens eyes to voice Intake and Output: 
Current Shift:    
Last three shifts:  09/09 1901 - 09/11 0700 In: 2096 [I.V.:4450] Out: 2602 [[facility]:[id number]] Recent Results (from the past 24 hour(s)) GLUCOSE, POC Collection Time: 09/10/18 12:50 PM  
Result Value Ref Range Glucose (POC) 134 (H) 70 - 110 mg/dL CBC W/O DIFF Collection Time: 09/11/18  3:45 AM  
Result Value Ref Range WBC 16.8 (H) 4.6 - 13.2 K/uL  
 RBC 4.11 (L) 4.20 - 5.30 M/uL  
 HGB 12.4 12.0 - 16.0 g/dL HCT 36.7 35.0 - 45.0 % MCV 89.3 74.0 - 97.0 FL  
 MCH 30.2 24.0 - 34.0 PG  
 MCHC 33.8 31.0 - 37.0 g/dL  
 RDW 13.6 11.6 - 14.5 % PLATELET 301 852 - 805 K/uL  MPV 10.5 9.2 - 95.6 FL  
METABOLIC PANEL, BASIC  
 Collection Time: 09/11/18  3:45 AM  
Result Value Ref Range Sodium 143 136 - 145 mmol/L Potassium 3.0 (L) 3.5 - 5.5 mmol/L Chloride 105 100 - 108 mmol/L  
 CO2 30 21 - 32 mmol/L Anion gap 8 3.0 - 18 mmol/L Glucose 91 74 - 99 mg/dL BUN 10 7.0 - 18 MG/DL Creatinine 0.62 0.6 - 1.3 MG/DL  
 BUN/Creatinine ratio 16 12 - 20 GFR est AA >60 >60 ml/min/1.73m2 GFR est non-AA >60 >60 ml/min/1.73m2 Calcium 8.1 (L) 8.5 - 10.1 MG/DL Lab Results Component Value Date/Time Glucose 91 09/11/2018 03:45 AM  
 Glucose 162 (H) 09/09/2018 04:45 AM  
 Glucose 147 (H) 09/05/2018 12:55 PM  
 Glucose 113 (H) 10/13/2013 06:09 AM  
 Glucose 120 (H) 10/12/2013 05:40 AM  
 EXAM: CTA HEAD AND NECK 
  INDICATION: Subarachnoid hemorrhage, possible aneurysm 
  
COMPARISON: MR brain 9/9/2018 
  
TECHNIQUE:  Multiple axial CT images of the neck were obtained extending from 
the level of the aortic arch to the skull base after the administration of the 
IV contrast utilizing a CTA protocol. Maximum intensity projection 
reconstructions were performed in multiple planes. Multiple axial CT images of the head were obtained extending from below the 
level of the skull base to the vertex after the administration of the IV 
contrast utilizing a CTA protocol. Maximum intensity projection reconstructions 
were performed in three planes. Additional 3 D reconstructions were performed 
at a separate workstation. One or more dose reduction techniques were used on this CT: automated exposure 
control, adjustment of the mAs and/or kVp according to patient's size, and 
iterative reconstruction techniques. The specific techniques utilized on this CT 
exam have been documented in the patient's electronic medical record. 
  
FINDINGS: 
  
Motion artifact is present which limits evaluation. 
  
CTA NECK 
  
Mild amount of atherosclerotic calcifications are present along the aortic arch. No high-grade proximal great vessel origin stenosis is seen. Mild narrowing of 
the proximal right subclavian artery origin is noted. 
  
The left common carotid artery is mildly irregular. The left carotid 
bifurcation is moderately irregular. Estimated minimal luminal diameter proximal 
left ICA 2.7 mm. Estimated luminal diameter of normal left ICA cervical segment 
is 5.1 mm. Estimated degree of stenosis of the left ICA based upon NASCET 
criteria is 48%. Remainder of left ICA cervical segment is minimally irregular. 
  
The right common carotid artery is slightly irregular. The right carotid 
bifurcation is mildly irregular. Estimated minimal luminal diameter proximal 
right ICA 3.7 mm. Estimated luminal diameter of normal right ICA cervical 
segment is 4.7 mm. Estimated degree of stenosis of the right ICA based upon NASCET criteria is 22%. Remainder of right ICA cervical segment is a normal 
variant vascular loop without focal stenosis. 
  
Left vertebral artery is mildly dominant. No focal vertebral artery stenosis is 
seen. 
  
Degenerative changes are seen in the spine. Nasogastric tube is present 
extending inferiorly out of the field-of-view in the esophagus. 
  
Air-fluid level is present in the right maxillary antrum. Additional right 
frontal sinus air-fluid level is present. Mild mucoperiosteal thickening seen in 
the ethmoidal air cells. Mastoid air cells are unremarkable. 
  
CTA HEAD 
  
There is no focal high-grade stenosis within the intracranial arteries. Mild 
atherosclerotic irregularity is seen in the bilateral carotid siphons without 
high grade stenosis. The carotid terminus is unremarkable.  
  
No high-grade ANDREA stenosis is seen. Very small anterior to indicating artery is 
suggested. Slight irregularity seen in the bilateral MCA vessels mostly along 
distal branches. 
  
Left vertebral artery is mildly dominant intracranially.  PICA origins are 
 unremarkable. Mild tortuosity is present involving the basilar artery without 
focal stenosis. Mild irregularity is seen in bilateral PCA branches distally. The dural venous sinuses are patent. Sharply defined rounded hypodensities are 
present distally in the transverse sinuses bilaterally compatible with 
pacchionian arachnoid granulation. No abnormal vessels are seen towards the 
vertex on the right in location of previously suggested small volume 
subarachnoid hemorrhage. 
  
  
_________________________ 
  
IMPRESSION IMPRESSION: 
  
1. No intracranial aneurysm or vascular malformation is identified to suggest 
etiology of previously seen small volume subarachnoid hemorrhage. 
  
2.  Mild to moderate irregularity involving bilateral proximal ICA, estimated 48% stenosis on the left and 22% stenosis on the right, based on NASCET 
criteria.  
  
3. No high-grade vertebral artery stenosis. 
  
4. No definite high-grade intracranial stenosis. Mild multifocal irregularity 
and narrowing involving both anterior and posterior circulation, greatest 
involving the bilateral PCA. This finding is nonspecific but may represent 
intracranial atherosclerotic disease. Difficult to exclude other vasculitic 
processes. 
  
5. No evidence of dural venous sinus thrombosis. Assessment/Plan:  
 
Patient Active Problem List  
Diagnosis Code  DJD (degenerative joint disease) M19.90  
 HTN (hypertension) I10  
 COPD (chronic obstructive pulmonary disease) ([facility] J44.9  Status post laparoscopic cholecystectomy Z90.49  Tachycardia R00.0  Status epilepticus ([address] G40.901  Anxiety F41.9  GERD (gastroesophageal reflux disease) K21.9  Acute kidney failure (HCC) N17.9  Erythrocytosis D75.1  Lymphocytosis D72.820  
 Positive urine drug screen R82.5  Postictal state ([facility] [address] R56.9 A/P: 
 
Seizure Activity -neuro following-appreciate  
-PB, depakote 
-seizure precautions 
-CT head negative -EEG completed 9/10-IMPRESSION:  Abnormal recording revealing the presence of moderate slowing of the background activity consistent with encephalopathy. This is a nonspecific finding and may be due to toxic, metabolic or inflammatory causes. No epileptiform activity was noted. (per neuro reading) -MRI c/w small area of subarachnoid hemorrhage on cortical surface right frontal lobe plus patchy brain edema, possible 2/2 malignant hypertension. -ativan prn  
-LP completed-CSF tubes 1 & 4 negative ; acyclovir d/c'd  
-will await further recommendations from neurology -ECHO pending 
-sitter at bedside Subarachnoid bleed 
-neurology following -appreciate  
-MRI showed small area of subarachnoid hemorrhage on cortical surface right frontal lobe plus patchy brain edema, possible 2/2 malignant hypertension. -SBP < 140   
-ECHO pending 
-CTA negative for aneurysm HTN 
-keep SBP < 140  
-increased prn, added scheduled hydrochlorathiazide  
-hold cozaar until kidney numbers improve COPD 
-sat goal > 90% (titrate down O2 as tolerated) -PCCM following 
-pulmicort/brovana Urinary retention 
-straight cath X 2 
-bladder scan prn 
-place ken if needed (but would try to avoid w/ elevated WBCs) GERD  
-protonix DVT prophylaxis 
-SCDs GI-protonix [place] Need to address feeding status  
lyte protocol added SANGEETHA Avalos, NP-C [id number] On [facility] of [facility] [id number] Office:  [phone]

## 2018-09-11 NOTE — PROGRESS NOTES
Progress Note Patient: [place] Monique MRN: [id number]  SSN: [id number] YOB: 1960  Age: 60 y.o. Sex: female Admit Date: 9/9/2018 LOS: 2 days Subjective:  
 
9/10/18 EEG showed moderate generalized slowing, consistent with encephalopathy, but no epileptiform activity. Patient has small SAH and h/o severe headaches with valsalva prior to admission. CTA done to look for aneurysm:  Fortunately, no aneurysm identified. Mild to moderate irregularity in both proximal ICAs noted, causing <50% stenoses. Scattered irregularity seen consistent with ASVDz but no high grade stenoses seen. No evidence of venous sinus thrombosis either. The patient reportedly is mildly more responsive, but still mostly obtunded. She sometimes has odd leg movement, for which nursing has been giving Ativan, thinking that it might be seizure activity. The patient's daughters report that the patient was squeezing with both hands and wiggling her feet to command earlier today. Labs:  K+ low at 3.0. Depakote level 89 this AM,  PB level 19.9 yest AM.  No new CSF studies back. Echo done, report pending. Objective:  
 
Vitals:  
 09/11/18 1000 09/11/18 1048 09/11/18 1100 09/11/18 1200 BP: 160/60 (!) 166/135 128/45 163/61 Pulse: 86  82 (!) 110 Resp: 21  23 19 Temp:      
SpO2: 95%  97% 94% Weight:      
Height:      
  
 
Physical Exam:  
Snoring stertorously. Moved all 4 extremities spontaneously but did not obey commands. Pupils 2 mm, round, minimally responsive to light. Patient groaned and moved arms to verbal stim. Lab/Data Review: 
Recent Results (from the past 48 hour(s)) PHENOBARBITAL Collection Time: 09/09/18  2:36 PM  
Result Value Ref Range Phenobarbital 19.9 15 - 35 mcg/ml VALPROIC ACID Collection Time: 09/09/18  2:36 PM  
Result Value Ref Range Valproic acid 64 50 - 100 ug/ml URINALYSIS W/ RFLX MICROSCOPIC  
 Collection Time: 09/09/18 11:20 PM  
Result Value Ref Range Color YELLOW Appearance CLEAR Specific gravity 1.018 1.005 - 1.030    
 pH (UA) 7.0 5.0 - 8.0 Protein NEGATIVE  NEG mg/dL Glucose 100 (A) NEG mg/dL Ketone TRACE (A) NEG mg/dL Bilirubin NEGATIVE  NEG Blood NEGATIVE  NEG Urobilinogen 0.2 0.2 - 1.0 EU/dL Nitrites NEGATIVE  NEG Leukocyte Esterase TRACE (A) NEG URINE MICROSCOPIC ONLY Collection Time: 09/09/18 11:20 PM  
Result Value Ref Range WBC 0 to 3 0 - 4 /hpf  
 RBC NEGATIVE  0 - 5 /hpf Epithelial cells FEW 0 - 5 /lpf Bacteria NEGATIVE  NEG /hpf  
CBC WITH AUTOMATED DIFF Collection Time: 09/10/18  3:35 AM  
Result Value Ref Range WBC 17.4 (H) 4.6 - 13.2 K/uL  
 RBC 4.30 4.20 - 5.30 M/uL  
 HGB 12.9 12.0 - 16.0 g/dL HCT 38.5 35.0 - 45.0 % MCV 89.5 74.0 - 97.0 FL  
 MCH 30.0 24.0 - 34.0 PG  
 MCHC 33.5 31.0 - 37.0 g/dL  
 RDW 13.5 11.6 - 14.5 % PLATELET 172 554 - 042 K/uL MPV 10.1 9.2 - 11.8 FL  
 NEUTROPHILS 69 40 - 73 % LYMPHOCYTES 23 21 - 52 % MONOCYTES 7 3 - 10 % EOSINOPHILS 1 0 - 5 % BASOPHILS 0 0 - 2 %  
 ABS. NEUTROPHILS 12.0 (H) 1.8 - 8.0 K/UL  
 ABS. LYMPHOCYTES 4.0 (H) 0.9 - 3.6 K/UL  
 ABS. MONOCYTES 1.2 0.05 - 1.2 K/UL  
 ABS. EOSINOPHILS 0.1 0.0 - 0.4 K/UL  
 ABS. BASOPHILS 0.0 0.0 - 0.1 K/UL  
 DF AUTOMATED    
GLUCOSE, POC Collection Time: 09/10/18 12:50 PM  
Result Value Ref Range Glucose (POC) 134 (H) 70 - 110 mg/dL CBC W/O DIFF Collection Time: 09/11/18  3:45 AM  
Result Value Ref Range WBC 16.8 (H) 4.6 - 13.2 K/uL  
 RBC 4.11 (L) 4.20 - 5.30 M/uL  
 HGB 12.4 12.0 - 16.0 g/dL HCT 36.7 35.0 - 45.0 % MCV 89.3 74.0 - 97.0 FL  
 MCH 30.2 24.0 - 34.0 PG  
 MCHC 33.8 31.0 - 37.0 g/dL  
 RDW 13.6 11.6 - 14.5 % PLATELET 548 861 - 844 K/uL MPV 10.5 9.2 - 69.6 FL  
METABOLIC PANEL, BASIC Collection Time: 09/11/18  3:45 AM  
Result Value Ref Range  Sodium 143 136 - 145 mmol/L  
 Potassium 3.0 (L) 3.5 - 5.5 mmol/L Chloride 105 100 - 108 mmol/L  
 CO2 30 21 - 32 mmol/L Anion gap 8 3.0 - 18 mmol/L Glucose 91 74 - 99 mg/dL BUN 10 7.0 - 18 MG/DL Creatinine 0.62 0.6 - 1.3 MG/DL  
 BUN/Creatinine ratio 16 12 - 20 GFR est AA >60 >60 ml/min/1.73m2 GFR est non-AA >60 >60 ml/min/1.73m2 Calcium 8.1 (L) 8.5 - 10.1 MG/DL  
VALPROIC ACID Collection Time: 09/11/18  3:45 AM  
Result Value Ref Range Valproic acid 89 50 - 100 ug/ml MAGNESIUM Collection Time: 09/11/18  3:45 AM  
Result Value Ref Range Magnesium 1.8 1.6 - 2.6 mg/dL PHOSPHORUS Collection Time: 09/11/18  3:45 AM  
Result Value Ref Range Phosphorus 1.5 (L) 2.5 - 4.9 MG/DL Assessment:  
 
Principal Problem: 
  Status epilepticus ([facility] (9/9/2018) Active Problems: 
  HTN (hypertension) (9/27/2013) COPD [facility] (9/27/2013) Tachycardia (9/9/2018) Anxiety (9/9/2018) GERD (gastroesophageal reflux disease) (9/9/2018) Overview: H/O--NOT ON MEDICATION AT PRESENT Acute kidney failure ([facility] [address] (9/9/2018) Erythrocytosis (9/9/2018) Lymphocytosis (9/9/2018) Positive urine drug screen (9/9/2018) Overview: THC Postictal state ([facility] (9/9/2018) Plan: 1. 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 2.  Small SAH. Daughters said that patient had fallen out of bed, so this could be traumatic. CTA did not show evidence of an aneurysm, fortunately. 3.  Seizures. On Depacon with excellent level. I think that phenobarbital and repeated Ativan doses are keeping the patient encephalopathic. I do not think that the leg movements described are seizures, which should include jerking of the arms, deviation of the eyes or eye fluttering, marked elevation of heart rate and BP, and drops on O2 sats. If all of that is seen, the nurse will page me.   I will stop prn Ativan to allow patient to wake up. I also has stopped phenobarbital which is quite sedating. The patient should stay in the ICU until she is awake and talking. Discussed above with patient's daughter and nurse. Signed By: Romero Vivas MD   
 September 11, 2018

## 2018-09-11 NOTE — PROGRESS NOTES
PCCM Progress Note I have reviewed the flowsheet and previous days notes. Events, vitals, medications and notes from last 24 hours reviewed. Care plan discussed with staff and on multidisciplinary rounds. Subjective: 
9/11/2018 Pt is more responsive today per her family. She is responding to her family. On my assessment pt is restless but more awake than yesterday Impression and Plan Status epilepticus - Pt has been evaluated by neurology. Had a LP done. MRI brain shows - Possible PRES, Minimal subarachnoid and/or subcutaneous hemorrhage along the high right frontal convexity, no acute infarct. Pt had an EEG done which shows per neuro - Abnormal recording revealing the presence of moderate slowing of the background activity consistent with encephalopathy.  This is a nonspecific finding and may be due to toxic, metabolic or inflammatory causes.  No epileptiform activity was noted. Acyclovir stopped by neurology. Pt is on Depacon and Valproate. Will defer further mgt to Neurology. Small SAH - CTA Head shows No intracranial aneurysm or vascular malformation is identified. F/u with neurology HTN - Goal SBP is <140mmHg per Neurology. Pt is now on labetalol and Hydralazine prn. Also on HCTZ 
COPD - Pt is on Brovana and Pulmicort nebs Leucocytosis - Lower today than yesterday, pt had low grade fever overnight. Will send blood cx and start Rocephin emp. Repeat Cxr today Hx of Anxiety DVT and GI proph - SCD and Protonix Low K and Phos - Replace OTHER: 
Glycemic Control. Glucose stabilizer per ICU protocol when on insulin drip. Maintain blood glucose 140-180. Replace electrolytes per ICU electrolyte replacement protocol HOB >=30 degree elevation all the time. Aggressive pulmonary toileting. Incentive spirometry when appropriate. Aspiration precautions. Ken bundle followed, remove ken catheter when not critically ill. PT/OT eval and treat. OOB/IS when appropriate. Quality Care: Stress ulcer prophylaxis, DVT prophylaxis, HOB elevated, Infection control all reviewed and addressed. Events and notes from last 24 hours reviewed. Care plan discussed with nursing. D/w patient's family above medical problems and answered all questions to their satisfaction. CC TIME: >45 min Medication Reviewed: Allergies Allergen Reactions  Lisinopril Cough Past Medical History:  
Diagnosis Date  Abdominal pain  Anxiety  COPD (chronic obstructive pulmonary disease) (HCC)  DJD (degenerative joint disease) of cervical spine  Elevated cholesterol  GERD (gastroesophageal reflux disease) H/O--NOT ON MEDICATION AT PRESENT  
 Hypertension  Nausea and vomiting Past Surgical History:  
Procedure Laterality Date  EXCISION TUMOR SOFT TISSUE FOOT/TOE SUBQ <1.5CM  2011  
 2 [facility] neuroma right foot  HX CARPAL TUNNEL RELEASE  2002  HX CHOLECYSTECTOMY  10/14/2013  HX HYSTERECTOMY  1994  
 vaginal (ovaries still in) [facility]  [place] REMOVAL 1ST/CERVICAL RIB  1984  
 right side 1st rib  REMOVAL OF RIB(S)  1976  
 left side 1st rib Social History Substance Use Topics  Smoking status: Former Smoker  Smokeless tobacco: Never Used Comment: stop 4/2013; uses electronic cigarette  Alcohol use No  
  
Family History Problem Relation Age of Onset  Heart Disease Mother  Hypertension Mother  Diabetes Mother  Hypertension Maternal Aunt  Diabetes Maternal Aunt  Thyroid Disease Sister Prior to Admission medications Medication Sig Start Date End Date Taking? Authorizing Provider  
escitalopram oxalate (LEXAPRO) 20 mg tablet Take 20 mg by mouth daily. Andrew Treadwell MD  
pregabalin (LYRICA) 50 mg capsule Take 50 mg by mouth three (3) times daily.     Andrew Treadwell MD  
 HYDROcodone-acetaminophen (NORCO) 5-325 mg per tablet Take 1 Tab by mouth every eight (8) hours as needed for Pain. 10/10/13   Johnna Salnias PA-C  
ondansetron (ZOFRAN ODT) 8 mg disintegrating tablet Take 1 Tab by mouth every eight (8) hours as needed for Nausea. 10/10/13   Johnna Salinas PA-C  
fluticasone-salmeterol (ADVAIR DISKUS) 250-50 mcg/dose diskus inhaler Take 1 Puff by inhalation every twelve (12) hours. Historical Provider  
omega-3 fatty acids-vitamin e (FISH OIL) 1,000 mg cap Take 1 Cap by mouth. Historical Provider  
cyclobenzaprine (FLEXERIL) 10 mg tablet Take  by mouth three (3) times daily as needed. Historical Provider  
losartan-hydrochlorothiazide (HYZAAR) 100-25 mg per tablet Take 1 Tab by mouth daily. Historical Provider ALPRAZolam (XANAX) 0.5 mg tablet Take  by mouth. Historical Provider Current Facility-Administered Medications Medication Dose Route Frequency  hydroCHLOROthiazide (HYDRODIURIL) tablet 25 mg  25 mg Oral DAILY  PHENobarbital (LUMINAL) injection 75 mg  75 mg IntraVENous QPM  
 dextrose 5% and 0.9% NaCl infusion  100 mL/hr IntraVENous CONTINUOUS  
 sodium chloride (NS) flush 5-10 mL  5-10 mL IntraVENous Q8H  
 sodium chloride (NS) flush 5-10 mL  5-10 mL IntraVENous Q8H  
 arformoterol (BROVANA) neb solution 15 mcg  15 mcg Nebulization BID RT  
 budesonide (PULMICORT) 500 mcg/2 ml nebulizer suspension  500 mcg Nebulization BID RT  
 pantoprazole (PROTONIX) 40 mg in sodium chloride 0.9% 10 mL injection  40 mg IntraVENous DAILY  valproate (DEPACON) 750 mg in 0.9% sodium chloride 50 mL IVPB  750 mg IntraVENous Q6H Peripheral Intravenous Line: 
Peripheral IV 09/09/18 Right Antecubital (Active) Site Assessment Clean, dry, & intact 9/10/2018  7:00 AM  
Phlebitis Assessment 0 9/10/2018  7:00 AM  
Infiltration Assessment 0 9/10/2018  7:00 AM  
Dressing Status Clean, dry, & intact 9/10/2018  7:00 AM  
 Dressing Type Transparent 9/10/2018  7:00 AM  
Hub Color/Line Status Green;Patent;Capped 9/10/2018  7:00 AM  
Action Taken Open ports on tubing capped 9/10/2018  7:00 AM  
Alcohol Cap Used Yes 9/10/2018  7:00 AM  
   
Peripheral IV 09/09/18 Left Antecubital (Active) Site Assessment Clean, dry, & intact 9/10/2018  7:00 AM  
Phlebitis Assessment 0 9/10/2018  7:00 AM  
Infiltration Assessment 0 9/10/2018  7:00 AM  
Dressing Status Clean, dry, & intact 9/10/2018  7:00 AM  
Dressing Type Transparent 9/10/2018  7:00 AM  
Hub Color/Line Status Green;Patent; Infusing 9/10/2018  7:00 AM  
Action Taken Open ports on tubing capped 9/10/2018  7:00 AM  
Alcohol Cap Used Yes 9/10/2018  7:00 AM  
   
Peripheral IV 09/09/18 Right Hand (Active) Site Assessment Clean, dry, & intact 9/10/2018  7:00 AM  
Phlebitis Assessment 0 9/10/2018  7:00 AM  
Infiltration Assessment 0 9/10/2018  7:00 AM  
Dressing Status Clean, dry, & intact 9/10/2018  7:00 AM  
Dressing Type Transparent 9/10/2018  7:00 AM  
Hub Color/Line Status Pink;Patent;Capped 9/10/2018  7:00 AM  
Action Taken Open ports on tubing capped 9/10/2018  7:00 AM  
Alcohol Cap Used Yes 9/10/2018  7:00 AM  
 
Drain(s): 
Nasogastric Tube 09/09/18 (Active) Site Assessment Clean, dry, & intact 9/10/2018  7:30 AM  
Dressing Status Clean, dry, & intact 9/10/2018  7:30 AM  
G Port Status Intermittent Suction 9/10/2018  7:30 AM  
External Insertion Evens (cms) 60 cms 9/10/2018  7:30 AM  
Action Taken Placement verified (comment) 9/10/2018  7:30 AM  
Drainage Description Willye Karissa 9/10/2018  7:30 AM  
Gastric Residual (mL) 0 ml 9/10/2018  7:30 AM  
Drainage Chamber Level (ml) 0 ml 9/10/2018  7:00 AM  
Output (ml) 0 ml 9/10/2018  7:30 AM  
 
Objective: 
Vital Signs:   
Visit Vitals  BP (!) 166/135  Pulse 86  Temp 98.1 °F (36.7 °C)  Resp 21  
 Ht 5' 5\" (1.651 m)  Wt 104.1 kg (229 lb 8 oz)  SpO2 96%  BMI 38.19 kg/m2 O2 Device: Nasal cannula O2 Flow Rate (L/min): 4 l/min Temp (24hrs), Av.7 °F (37.1 °C), Min:97.7 °F (36.5 °C), Max:100 °F (37.8 °C) Intake/Output:  
Last shift:        
 
Last 3 shifts:  1901 -  0700 In: 0108 [I.V.:4650] Out: 2602 [BKYTJ:1758] Intake/Output Summary (Last 24 hours) at 18 1139 Last data filed at 18 0700 Gross per 24 hour Intake             2970 ml Output             1135 ml Net             1835 ml Last 3 Recorded Weights in this Encounter 18 0500 09/10/18 2418 Weight: 111.1 kg (245 lb) 104.1 kg (229 lb 8 oz) Physical Exam:  
 
General/Neurology: restless, moving all ext Head:   Normocephalic, without obvious abnormality Eye:   no scleral icterus, no pallor Oral:   Mucus membranes moist 
Neck:   Supple Lung:   B/l air entry is fair Heart:   Regular rate & rhythm. S1 S2 present. Abdomen/: Soft, non tender, BS +nt Extremities:  No pedal edema Skin:   Dry, intact Data: 
   
Recent Results (from the past 24 hour(s)) GLUCOSE, POC Collection Time: 09/10/18 12:50 PM  
Result Value Ref Range Glucose (POC) 134 (H) 70 - 110 mg/dL CBC W/O DIFF Collection Time: 18  3:45 AM  
Result Value Ref Range WBC 16.8 (H) 4.6 - 13.2 K/uL  
 RBC 4.11 (L) 4.20 - 5.30 M/uL  
 HGB 12.4 12.0 - 16.0 g/dL HCT 36.7 35.0 - 45.0 % MCV 89.3 74.0 - 97.0 FL  
 MCH 30.2 24.0 - 34.0 PG  
 MCHC 33.8 31.0 - 37.0 g/dL  
 RDW 13.6 11.6 - 14.5 % PLATELET 516 302 - 722 K/uL MPV 10.5 9.2 - 19.5 FL  
METABOLIC PANEL, BASIC Collection Time: 18  3:45 AM  
Result Value Ref Range Sodium 143 136 - 145 mmol/L Potassium 3.0 (L) 3.5 - 5.5 mmol/L Chloride 105 100 - 108 mmol/L  
 CO2 30 21 - 32 mmol/L Anion gap 8 3.0 - 18 mmol/L Glucose 91 74 - 99 mg/dL BUN 10 7.0 - 18 MG/DL Creatinine 0.62 0.6 - 1.3 MG/DL  
 BUN/Creatinine ratio 16 12 - 20 GFR est AA >60 >60 ml/min/1.73m2 GFR est non-AA >60 >60 ml/min/1.73m2 Calcium 8.1 (L) 8.5 - 10.1 MG/DL MAGNESIUM Collection Time: 09/11/18  3:45 AM  
Result Value Ref Range Magnesium 1.8 1.6 - 2.6 mg/dL PHOSPHORUS Collection Time: 09/11/18  3:45 AM  
Result Value Ref Range Phosphorus 1.5 (L) 2.5 - 4.9 MG/DL Chemistry Recent Labs  
   09/11/18 
 0345  09/09/18 
 0445 GLU  91  162* NA  143  139  
K  3.0*  5.0  
CL  105  104 CO2  30  21 BUN  10  19* CREA  0.62  1.49* CA  8.1*  10.3* MG  1.8  2.1 PHOS  1.5*   --   
AGAP  8  14 BUCR  16  13 AP   --   131* TP   --   8.2 ALB   --   4.4 GLOB   --   3.8 AGRAT   --   1.2  
 
 
CBC w/Diff Recent Labs  
   09/11/18 
 0345  09/10/18 
 0335  09/09/18 
 0445 WBC  16.8*  17.4*  24.1*  
RBC  4.11*  4.30  5.55* HGB  12.4  12.9  17.0*  
HCT  36.7  38.5  49.9*  
PLT  244  275  389 GRANS   --   69  28* LYMPH   --   23  59* EOS   --   1  3 Micro Recent Labs  
   09/09/18 
 1058 CULT  NO GROWTH 2 DAYS  PENDING Recent Labs  
   09/09/18 
 1058 CULT  NO GROWTH 2 DAYS  PENDING  
 
 
CT (Most Recent) Results from Hospital Encounter encounter on 09/09/18 CTA HEAD NECK W WO CONT Narrative EXAM: CTA HEAD AND NECK INDICATION: Subarachnoid hemorrhage, possible aneurysm COMPARISON: MR brain 9/9/2018 TECHNIQUE:  Multiple axial CT images of the neck were obtained extending from 
the level of the aortic arch to the skull base after the administration of the 
IV contrast utilizing a CTA protocol. Maximum intensity projection 
reconstructions were performed in multiple planes. Multiple axial CT images of the head were obtained extending from below the 
level of the skull base to the vertex after the administration of the IV 
contrast utilizing a CTA protocol. Maximum intensity projection reconstructions 
were performed in three planes. Additional 3 D reconstructions were performed 
at a separate workstation. One or more dose reduction techniques were used on this CT: automated exposure 
control, adjustment of the mAs and/or kVp according to patient's size, and 
iterative reconstruction techniques. The specific techniques utilized on this CT 
exam have been documented in the patient's electronic medical record. FINDINGS: 
 
Motion artifact is present which limits evaluation. CTA NECK Mild amount of atherosclerotic calcifications are present along the aortic arch. No high-grade proximal great vessel origin stenosis is seen. Mild narrowing of 
the proximal right subclavian artery origin is noted. The left common carotid artery is mildly irregular. The left carotid 
bifurcation is moderately irregular. Estimated minimal luminal diameter proximal 
left ICA 2.7 mm. Estimated luminal diameter of normal left ICA cervical segment 
is 5.1 mm. Estimated degree of stenosis of the left ICA based upon NASCET 
criteria is 48%. Remainder of left ICA cervical segment is minimally irregular. The right common carotid artery is slightly irregular. The right carotid 
bifurcation is mildly irregular. Estimated minimal luminal diameter proximal 
right ICA 3.7 mm. Estimated luminal diameter of normal right ICA cervical 
segment is 4.7 mm. Estimated degree of stenosis of the right ICA based upon NASCET criteria is 22%. Remainder of right ICA cervical segment is a normal 
variant vascular loop without focal stenosis. Left vertebral artery is mildly dominant. No focal vertebral artery stenosis is 
seen. Degenerative changes are seen in the spine. Nasogastric tube is present 
extending inferiorly out of the field-of-view in the esophagus. Air-fluid level is present in the right maxillary antrum. Additional right 
frontal sinus air-fluid level is present. Mild mucoperiosteal thickening seen in 
the ethmoidal air cells. Mastoid air cells are unremarkable.  
 
CTA HEAD 
 
 There is no focal high-grade stenosis within the intracranial arteries. Mild 
atherosclerotic irregularity is seen in the bilateral carotid siphons without 
high grade stenosis. The carotid terminus is unremarkable. No high-grade ANDREA stenosis is seen. Very small anterior to indicating artery is 
suggested. Slight irregularity seen in the bilateral MCA vessels mostly along 
distal branches. Left vertebral artery is mildly dominant intracranially. PICA origins are 
unremarkable. Mild tortuosity is present involving the basilar artery without 
focal stenosis. Mild irregularity is seen in bilateral PCA branches distally. The dural venous sinuses are patent. Sharply defined rounded hypodensities are 
present distally in the transverse sinuses bilaterally compatible with 
pacchionian arachnoid granulation. No abnormal vessels are seen towards the 
vertex on the right in location of previously suggested small volume 
subarachnoid hemorrhage. 
 
 
_________________________ Impression IMPRESSION: 
 
1. No intracranial aneurysm or vascular malformation is identified to suggest 
etiology of previously seen small volume subarachnoid hemorrhage. 2.  Mild to moderate irregularity involving bilateral proximal ICA, estimated 48% stenosis on the left and 22% stenosis on the right, based on NASCET 
criteria. 3. No high-grade vertebral artery stenosis. 4. No definite high-grade intracranial stenosis. Mild multifocal irregularity 
and narrowing involving both anterior and posterior circulation, greatest 
involving the bilateral PCA. This finding is nonspecific but may represent 
intracranial atherosclerotic disease. Difficult to exclude other vasculitic 
processes. 5. No evidence of dural venous sinus thrombosis. XR (Most Recent). CXR reviewed by me and compared with previous CXR Results from Hospital Encounter encounter on 09/09/18 XR ABD PORT  1 V Narrative Abdomen, single view COMPARISON: 4/18/2016 INDICATION: Nasogastric tube placement FINDINGS: Supine AP portable view the abdomen obtained and interpreted with 
comparison as above. Nasogastric tube in position with tip and side-port within 
the stomach body. Minor atelectatic streaks are present at the lung bases which 
are otherwise clear. There are several right upper quadrant surgical clips 
noted. No acute osseous abnormality. Impression IMPRESSION: 
1. Nasogastric tube appropriately positioned within the stomach. 2. Minor atelectasis at the lung bases. Note: Preliminary report sent to the patient care division by the radiology 
resident at the time of the study. High complexity decision making was performed during the evaluation of this patient at high risk for decompensation with multiple organ involvement Above mentioned total time spent on reviewing the case/medical record/data/notes/EMR/patient examination/documentation/coordinating care with nurse/consultants, exclusive of procedures with complex decision making performed and > 50% time spent in face to face evaluation. Brittaney Childers MD 
9/11/2018

## 2018-09-11 NOTE — PROGRESS NOTES
0700: Received report from [facility]. Assumed care of patient in bed in low locked position with call bell and phone within reach. 1331: Seizure like activity observed, bilateral legs shaking lasting 5 sec, ativan given, effective 1648: seizure like activity arm and legs shaking patient eyes fixed, no response to name calling lasting 15 sec . Dr. Vivica Klinefelter paged for order 1649:seizure like activity arm and legs shaking patient eyes fixed, no response to name calling lasting 15 sec 
 
1651:seizure like activity arm and legs shaking patient eyes fixed, no response to name calling lasting 15 sec 
 
1653:seizure like activity arm and legs shaking patient eyes fixed, no response to name calling lasting 15 sec 
 
1654:seizure like activity arm and legs shaking patient eyes fixed, no response to name calling lasting 15 sec 
 
1656:seizure like activity arm and legs shaking patient eyes fixed, no response to name calling lasting 15 sec 
 
1700: Dr. Vivica Klinefelter gives order for vimpat 200 mg IV now loading dose, vimpat 100 mg  bid to start tonight 12 hours later, ativan 1 mg as needed for seizures lasting 2 minutes or longer may repeat q 10 min intervals 1930:Bedside and Verbal shift change report given to [facility] (oncoming nurse) by Ismael Ritchie RN (offgoing nurse). Report included the following information SBAR, Med Rec Status and Cardiac Rhythm sinus tach .

## 2018-09-11 NOTE — PROCEDURES
PICC line insertion RUE using 3CG technology. Tip confirmed SVC/CAJ and ready to use. Reported Myra SHELTON caring for patient.  Lidocaine 1% plain 4 ml used as local for insertion

## 2018-09-11 NOTE — PROCEDURES
[address]  MR#: [id number]  : 1960  ACCOUNT #: [de-identified]   DATE OF SERVICE: 09/10/2018    ELECTROENCEPHALOGRAM NUMBER:  [facility] . REFERRING PHYSICIAN:  Nigel Lowe MD    HISTORY:  The patient is a 65-year-old female brought to the emergency room with markedly elevated blood pressure and seizure activity. She was treated with benzodiazepines then loaded with Keppra and Depacon as well as phenobarbital.  At the time of the recording, the patient's medications included Depacon and phenobarbital.  EEG is requested for new onset seizures. DESCRIPTION:  This 17-channel EEG reveals background activity of 6-7 Hz predominantly with some slower theta and delta frequency activity intermixed in a generalized pattern. This activity was not responsive to alerting procedures or passive eye opening. Photic stimulation failed to produce a photic driving response. Hyperventilation could not be performed. No epileptiform activity was noted. IMPRESSION:  Abnormal recording revealing the presence of moderate slowing of the background activity consistent with encephalopathy. This is a nonspecific finding and may be due to toxic, metabolic or inflammatory causes. No epileptiform activity was noted.       MD SOLANGE Lopez / [place]  D: 09/10/2018 15:20     T: 09/10/2018 20:28  JOB #: [id number]

## 2018-09-12 ENCOUNTER — APPOINTMENT (OUTPATIENT)
Dept: GENERAL RADIOLOGY | Age: 58
DRG: 064 | End: 2018-09-12
Attending: INTERNAL MEDICINE
Payer: COMMERCIAL

## 2018-09-12 LAB
ALBUMIN SERPL-MCNC: 3 G/DL (ref 3.4–5)
ALBUMIN/GLOB SERPL: 1.1 {RATIO} (ref 0.8–1.7)
ALP SERPL-CCNC: 85 U/L (ref 45–117)
ALT SERPL-CCNC: 22 U/L (ref 13–56)
AMMONIA PLAS-SCNC: 11 UMOL/L (ref 11–32)
ANION GAP SERPL CALC-SCNC: 8 MMOL/L (ref 3–18)
AST SERPL-CCNC: 39 U/L (ref 15–37)
BILIRUB DIRECT SERPL-MCNC: 0.1 MG/DL (ref 0–0.2)
BILIRUB SERPL-MCNC: 0.4 MG/DL (ref 0.2–1)
BUN SERPL-MCNC: 8 MG/DL (ref 7–18)
BUN/CREAT SERPL: 14 (ref 12–20)
CA-I SERPL-SCNC: 1.08 MMOL/L (ref 1.12–1.32)
CA-I SERPL-SCNC: 1.08 MMOL/L (ref 1.12–1.32)
CALCIUM SERPL-MCNC: 8.1 MG/DL (ref 8.5–10.1)
CHLORIDE SERPL-SCNC: 104 MMOL/L (ref 100–108)
CO2 SERPL-SCNC: 32 MMOL/L (ref 21–32)
CREAT SERPL-MCNC: 0.59 MG/DL (ref 0.6–1.3)
ERYTHROCYTE [DISTWIDTH] IN BLOOD BY AUTOMATED COUNT: 13.5 % (ref 11.6–14.5)
GLOBULIN SER CALC-MCNC: 2.8 G/DL (ref 2–4)
GLUCOSE BLD STRIP.AUTO-MCNC: 95 MG/DL (ref 70–110)
GLUCOSE SERPL-MCNC: 77 MG/DL (ref 74–99)
HCT VFR BLD AUTO: 36.2 % (ref 35–45)
HGB BLD-MCNC: 12.1 G/DL (ref 12–16)
MAGNESIUM SERPL-MCNC: 1.8 MG/DL (ref 1.6–2.6)
MAGNESIUM SERPL-MCNC: 2 MG/DL (ref 1.6–2.6)
MCH RBC QN AUTO: 30.1 PG (ref 24–34)
MCHC RBC AUTO-ENTMCNC: 33.4 G/DL (ref 31–37)
MCV RBC AUTO: 90 FL (ref 74–97)
PHENOBARB SERPL-MCNC: 19.9 MCG/ML (ref 15–35)
PHOSPHATE SERPL-MCNC: 1.9 MG/DL (ref 2.5–4.9)
PHOSPHATE SERPL-MCNC: 2 MG/DL (ref 2.5–4.9)
PLATELET # BLD AUTO: 219 K/UL (ref 135–420)
PMV BLD AUTO: 11.4 FL (ref 9.2–11.8)
POTASSIUM SERPL-SCNC: 2.7 MMOL/L (ref 3.5–5.5)
POTASSIUM SERPL-SCNC: 3.1 MMOL/L (ref 3.5–5.5)
PROT SERPL-MCNC: 5.8 G/DL (ref 6.4–8.2)
RBC # BLD AUTO: 4.02 M/UL (ref 4.2–5.3)
SODIUM SERPL-SCNC: 144 MMOL/L (ref 136–145)
VALPROATE SERPL-MCNC: 102 UG/ML (ref 50–100)
WBC # BLD AUTO: 14.6 K/UL (ref 4.6–13.2)

## 2018-09-12 PROCEDURE — 36415 COLL VENOUS BLD VENIPUNCTURE: CPT | Performed by: HOSPITALIST

## 2018-09-12 PROCEDURE — C9254 INJECTION, LACOSAMIDE: HCPCS | Performed by: PSYCHIATRY & NEUROLOGY

## 2018-09-12 PROCEDURE — 83735 ASSAY OF MAGNESIUM: CPT | Performed by: HOSPITALIST

## 2018-09-12 PROCEDURE — 94640 AIRWAY INHALATION TREATMENT: CPT

## 2018-09-12 PROCEDURE — 74011000250 HC RX REV CODE- 250: Performed by: INTERNAL MEDICINE

## 2018-09-12 PROCEDURE — C9113 INJ PANTOPRAZOLE SODIUM, VIA: HCPCS | Performed by: INTERNAL MEDICINE

## 2018-09-12 PROCEDURE — 74011250636 HC RX REV CODE- 250/636: Performed by: PSYCHIATRY & NEUROLOGY

## 2018-09-12 PROCEDURE — 83735 ASSAY OF MAGNESIUM: CPT | Performed by: NURSE PRACTITIONER

## 2018-09-12 PROCEDURE — 84132 ASSAY OF SERUM POTASSIUM: CPT | Performed by: NURSE PRACTITIONER

## 2018-09-12 PROCEDURE — 74011000258 HC RX REV CODE- 258: Performed by: HOSPITALIST

## 2018-09-12 PROCEDURE — 74011000250 HC RX REV CODE- 250: Performed by: PSYCHIATRY & NEUROLOGY

## 2018-09-12 PROCEDURE — 74011250636 HC RX REV CODE- 250/636: Performed by: NURSE PRACTITIONER

## 2018-09-12 PROCEDURE — 74011000258 HC RX REV CODE- 258: Performed by: PSYCHIATRY & NEUROLOGY

## 2018-09-12 PROCEDURE — 74011250637 HC RX REV CODE- 250/637: Performed by: INTERNAL MEDICINE

## 2018-09-12 PROCEDURE — 65610000009 HC RM ICU NEURO

## 2018-09-12 PROCEDURE — 71045 X-RAY EXAM CHEST 1 VIEW: CPT

## 2018-09-12 PROCEDURE — 82140 ASSAY OF AMMONIA: CPT | Performed by: PSYCHIATRY & NEUROLOGY

## 2018-09-12 PROCEDURE — 74011250636 HC RX REV CODE- 250/636: Performed by: HOSPITALIST

## 2018-09-12 PROCEDURE — 74011000250 HC RX REV CODE- 250: Performed by: HOSPITALIST

## 2018-09-12 PROCEDURE — 74011250636 HC RX REV CODE- 250/636: Performed by: INTERNAL MEDICINE

## 2018-09-12 PROCEDURE — 74011250637 HC RX REV CODE- 250/637: Performed by: NURSE PRACTITIONER

## 2018-09-12 PROCEDURE — 77010033678 HC OXYGEN DAILY

## 2018-09-12 PROCEDURE — 80076 HEPATIC FUNCTION PANEL: CPT | Performed by: PSYCHIATRY & NEUROLOGY

## 2018-09-12 PROCEDURE — 82962 GLUCOSE BLOOD TEST: CPT

## 2018-09-12 PROCEDURE — 85027 COMPLETE CBC AUTOMATED: CPT | Performed by: NURSE PRACTITIONER

## 2018-09-12 PROCEDURE — 84100 ASSAY OF PHOSPHORUS: CPT | Performed by: NURSE PRACTITIONER

## 2018-09-12 PROCEDURE — 82330 ASSAY OF CALCIUM: CPT | Performed by: HOSPITALIST

## 2018-09-12 PROCEDURE — 84100 ASSAY OF PHOSPHORUS: CPT | Performed by: HOSPITALIST

## 2018-09-12 PROCEDURE — 74011250637 HC RX REV CODE- 250/637: Performed by: HOSPITALIST

## 2018-09-12 PROCEDURE — 80164 ASSAY DIPROPYLACETIC ACD TOT: CPT | Performed by: PSYCHIATRY & NEUROLOGY

## 2018-09-12 PROCEDURE — 74011000258 HC RX REV CODE- 258: Performed by: INTERNAL MEDICINE

## 2018-09-12 RX ORDER — POTASSIUM CHLORIDE 7.45 MG/ML
10 INJECTION INTRAVENOUS
Status: COMPLETED | OUTPATIENT
Start: 2018-09-12 | End: 2018-09-12

## 2018-09-12 RX ORDER — POTASSIUM CHLORIDE 7.45 MG/ML
10 INJECTION INTRAVENOUS
Status: COMPLETED | OUTPATIENT
Start: 2018-09-12 | End: 2018-09-13

## 2018-09-12 RX ORDER — MAGNESIUM SULFATE 1 G/100ML
1 INJECTION INTRAVENOUS ONCE
Status: COMPLETED | OUTPATIENT
Start: 2018-09-12 | End: 2018-09-12

## 2018-09-12 RX ORDER — AMLODIPINE BESYLATE 10 MG/1
10 TABLET ORAL DAILY
Status: DISCONTINUED | OUTPATIENT
Start: 2018-09-13 | End: 2018-09-18 | Stop reason: HOSPADM

## 2018-09-12 RX ORDER — LORAZEPAM 2 MG/ML
2 INJECTION INTRAMUSCULAR ONCE
Status: DISCONTINUED | OUTPATIENT
Start: 2018-09-12 | End: 2018-09-12

## 2018-09-12 RX ORDER — LORAZEPAM 2 MG/ML
2 INJECTION INTRAMUSCULAR
Status: COMPLETED | OUTPATIENT
Start: 2018-09-12 | End: 2018-09-13

## 2018-09-12 RX ORDER — DEXTROSE, SODIUM CHLORIDE, AND POTASSIUM CHLORIDE 5; .45; .15 G/100ML; G/100ML; G/100ML
100 INJECTION INTRAVENOUS CONTINUOUS
Status: DISCONTINUED | OUTPATIENT
Start: 2018-09-12 | End: 2018-09-16

## 2018-09-12 RX ORDER — AMLODIPINE BESYLATE 5 MG/1
5 TABLET ORAL ONCE
Status: COMPLETED | OUTPATIENT
Start: 2018-09-12 | End: 2018-09-12

## 2018-09-12 RX ADMIN — AMLODIPINE BESYLATE 5 MG: 5 TABLET ORAL at 08:58

## 2018-09-12 RX ADMIN — SODIUM CHLORIDE 750 MG: 900 INJECTION, SOLUTION INTRAVENOUS at 08:32

## 2018-09-12 RX ADMIN — CALCIUM GLUCONATE 2 G: 94 INJECTION, SOLUTION INTRAVENOUS at 08:43

## 2018-09-12 RX ADMIN — Medication 10 ML: at 14:00

## 2018-09-12 RX ADMIN — SODIUM CHLORIDE 40 MG: 9 INJECTION, SOLUTION INTRAMUSCULAR; INTRAVENOUS; SUBCUTANEOUS at 08:41

## 2018-09-12 RX ADMIN — POTASSIUM CHLORIDE 10 MEQ: 7.46 INJECTION, SOLUTION INTRAVENOUS at 09:25

## 2018-09-12 RX ADMIN — BUDESONIDE 500 MCG: 0.5 INHALANT RESPIRATORY (INHALATION) at 09:09

## 2018-09-12 RX ADMIN — SODIUM CHLORIDE 750 MG: 900 INJECTION, SOLUTION INTRAVENOUS at 14:47

## 2018-09-12 RX ADMIN — CEFTRIAXONE 1 G: 1 INJECTION, POWDER, FOR SOLUTION INTRAMUSCULAR; INTRAVENOUS at 11:26

## 2018-09-12 RX ADMIN — POTASSIUM CHLORIDE 10 MEQ: 7.46 INJECTION, SOLUTION INTRAVENOUS at 12:39

## 2018-09-12 RX ADMIN — DEXTROSE MONOHYDRATE, SODIUM CHLORIDE, AND POTASSIUM CHLORIDE 100 ML/HR: 50; 4.5; 1.49 INJECTION, SOLUTION INTRAVENOUS at 08:31

## 2018-09-12 RX ADMIN — HYDROCHLOROTHIAZIDE 25 MG: 25 TABLET ORAL at 08:41

## 2018-09-12 RX ADMIN — Medication 10 ML: at 17:00

## 2018-09-12 RX ADMIN — BUDESONIDE 500 MCG: 0.5 INHALANT RESPIRATORY (INHALATION) at 20:11

## 2018-09-12 RX ADMIN — Medication 10 ML: at 22:00

## 2018-09-12 RX ADMIN — DEXTROSE MONOHYDRATE AND SODIUM CHLORIDE 100 ML/HR: 5; .9 INJECTION, SOLUTION INTRAVENOUS at 06:49

## 2018-09-12 RX ADMIN — AMLODIPINE BESYLATE 5 MG: 5 TABLET ORAL at 11:26

## 2018-09-12 RX ADMIN — POTASSIUM CHLORIDE 10 MEQ: 10 INJECTION, SOLUTION INTRAVENOUS at 22:19

## 2018-09-12 RX ADMIN — LORAZEPAM 1 MG: 2 INJECTION, SOLUTION INTRAMUSCULAR; INTRAVENOUS at 03:00

## 2018-09-12 RX ADMIN — ARFORMOTEROL TARTRATE 15 MCG: 15 SOLUTION RESPIRATORY (INHALATION) at 09:09

## 2018-09-12 RX ADMIN — Medication 10 ML: at 00:42

## 2018-09-12 RX ADMIN — DEXTROSE MONOHYDRATE, SODIUM CHLORIDE, AND POTASSIUM CHLORIDE 100 ML/HR: 50; 4.5; 1.49 INJECTION, SOLUTION INTRAVENOUS at 21:15

## 2018-09-12 RX ADMIN — SODIUM CHLORIDE 750 MG: 900 INJECTION, SOLUTION INTRAVENOUS at 03:05

## 2018-09-12 RX ADMIN — Medication 10 ML: at 23:05

## 2018-09-12 RX ADMIN — POTASSIUM CHLORIDE 10 MEQ: 7.46 INJECTION, SOLUTION INTRAVENOUS at 13:32

## 2018-09-12 RX ADMIN — Medication 10 ML: at 23:04

## 2018-09-12 RX ADMIN — SODIUM CHLORIDE 40 MG: 9 INJECTION, SOLUTION INTRAMUSCULAR; INTRAVENOUS; SUBCUTANEOUS at 21:15

## 2018-09-12 RX ADMIN — SODIUM CHLORIDE 6 MMOL: 900 INJECTION, SOLUTION INTRAVENOUS at 09:34

## 2018-09-12 RX ADMIN — SODIUM CHLORIDE 750 MG: 900 INJECTION, SOLUTION INTRAVENOUS at 23:05

## 2018-09-12 RX ADMIN — SODIUM CHLORIDE 100 MG: 900 INJECTION, SOLUTION INTRAVENOUS at 18:18

## 2018-09-12 RX ADMIN — POTASSIUM CHLORIDE 10 MEQ: 7.46 INJECTION, SOLUTION INTRAVENOUS at 11:26

## 2018-09-12 RX ADMIN — MAGNESIUM SULFATE HEPTAHYDRATE 1 G: 1 INJECTION, SOLUTION INTRAVENOUS at 08:33

## 2018-09-12 RX ADMIN — POTASSIUM CHLORIDE 10 MEQ: 7.46 INJECTION, SOLUTION INTRAVENOUS at 08:39

## 2018-09-12 RX ADMIN — POTASSIUM CHLORIDE 10 MEQ: 10 INJECTION, SOLUTION INTRAVENOUS at 23:22

## 2018-09-12 RX ADMIN — Medication 10 ML: at 06:50

## 2018-09-12 RX ADMIN — POTASSIUM CHLORIDE 10 MEQ: 7.46 INJECTION, SOLUTION INTRAVENOUS at 10:38

## 2018-09-12 RX ADMIN — SODIUM CHLORIDE 100 MG: 900 INJECTION, SOLUTION INTRAVENOUS at 09:32

## 2018-09-12 RX ADMIN — ARFORMOTEROL TARTRATE 15 MCG: 15 SOLUTION RESPIRATORY (INHALATION) at 20:11

## 2018-09-12 RX ADMIN — HYDRALAZINE HYDROCHLORIDE 20 MG: 20 INJECTION INTRAMUSCULAR; INTRAVENOUS at 00:41

## 2018-09-12 NOTE — CDMP QUERY
Please clarify if this patient is being treated/managed for: 
 
=>acute encephalopathy as noted per EEG 
=>Other Explanation of clinical findingss =>Unable to Determine (no explanation of clinical findings) The medical record reflects the following: 
 
Risk: 59 yo female admitted with seizures and subarachnoid hemorrhage Clinical Indicators: Per EEG 9/11 Abnormal recording revealing the presence of moderate slowing of the background activity consistent with encephalopathy. This is a nonspecific finding and may be due to toxic, metabolic or inflammatory causes Per Neurology patient not following commands, moaning and withdraws to touch Treatment: Neurology consult EEG, ICU care, NIH neuro assessments Please clarify and document your clinical opinion in the progress notes and discharge summary including the definitive and/or presumptive diagnosis, (suspected or probable), related to the above clinical findings. Please include clinical findings supporting your diagnosis. If you DECLINE this query or would like to communicate with [facility], please utilize the \"[facility] message box\" at the TOP of the Progress Note on the right. Thank you, 
Korey Barrios RN   [phone]

## 2018-09-12 NOTE — PROGRESS NOTES
[facility] .  Patient repositioned in the bed with the help of a sitter who is also a CNA. Patient sleeping throughout, she os not following commands at this time, she does withdrawn from pain and moan with movement. Still very active in her bed. 
 
2222 Switched out patient's bed to try to get her more comfortable. Provided her with a geriatric bed so that she will have more room to move about. She is tossing and turning in the bed. No seizure activity noted, just restless. Will follow simple commands and answer some questions. 0000 Patient is still restless, unable to stay put in the bed. She tosses and turns. She is having involuntary movements that she is unable to control. 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 Her blood pressure is increasing, and she is sweating. These  not movements are not seizures. She still answers my questions and follows commands. Her movements are getting more intense. [facility] [facility],[address] She is pedaling her legs and feet as if she is sliding down something and is trying to get her footing. She is trying to turn onto her belly and when she is on her back, she has been lifting her hips up in the air and pushing her shoulders into the bed. She states that she feels like she is falling out of the bed. She curls her legs up then straightens them out. Her right arm raises above her head then down to her side. She scoots up in the bed, then down in the bed, she turns right then left. Patient has ativan on her MAR, it is ordered for seizures, I called Dr. Kareem Timmons and asked him if she could have a dose now despite me not witnessing seizure activity and the order was given. 9155 For the first time in many hours, the patient is sleeping comfortably. She will wake and answer questions, but is very groggy. No involuntary movements assessed at this time.  Her vitals are stable except that she is breathing heavy and is tachypneic. Sats are good. [place] Patient is still sleeping heavily. Vitals are stable, she is afebrile. Sitter still in the room. 0500 Gave patient a bath and changed her gown and linens. Thought since she had the Ativan in her system that she would get comfortable and go back to sleep, but her body has taken over and started its uncontrollable movements. [facility] SBAR report given to oncoming [facility]. There is a new sitter in the room. Patient is moving about the bed again.

## 2018-09-12 NOTE — CONSULTS
[address]  MR#: [id number]  : 1960  ACCOUNT #: [de-identified]   DATE OF SERVICE: 2018    REFERRING PHYSICIAN:  Dr. Sunil Barber. CONSULTING PHYSICIAN:  Baljit Amador. [place]. REASON FOR CONSULTATION:  Asked to see the patient because of bloody material in NG tube. HISTORY OF PRESENT ILLNESS:  A 58-year-old patient with a history of acid reflux disease who presented herself to the emergency room with complaints of severe headache, muscle spasms. While in the emergency room, she had witnessed tonic-clonic seizure with eye deviation to the left. This is her first known seizure according to the patient's family. She is awake today, able to provide some history, but most of the history provided by the family. The patient's nasogastric tube was placed when the patient was postictal, coffee-ground and reddish material has been suctioned out of the NG tube. We were asked to see the patient regarding this bloody NG aspirate. The family reports IBS mixed symptoms with alternating constipation and diarrhea with no particular pattern for most of the patient's adult life. The patient's bowel function in this manner also runs in the family. She does have a history of acid reflux disease. Apparently no Benoit's esophagus has been mentioned to the family. It is not clear that she has had an upper endoscopy in the past, and there is no proton pump inhibitor listed as one of her chronic medications. The family believes that she has had a colonoscopy in the past at age 47, was given 10-year interval for her next exam.  They are uncertain whether colonoscopy was performed, who the procedure physician was. She has no known history of hepatitis, jaundice, chronic liver disease, chronically elevated liver function tests. No history of peptic ulcers, pancreatitis or hiatal hernia. There is a mention of acid reflux disease in the chart.   The family is uncertain if she has acid reflux disease, apparently does not take acid reflux medication at home. She is taking aspirin 81 mg on a daily basis for many years at home, takes occasional NSAIDs otherwise as well. She has no known history of hepatitis, jaundice, chronic liver disease, chronically elevated liver function tests. No history of melena, hematochezia, blood or mucus per rectum. As above, she has apparently not undergone formal GI evaluation in the past, but has had a routine colonoscopy as according to the family. She currently has no active GI symptoms. She is hungry. She has no upper abdominal pain, does not feel nauseated, is very restless in bed and being attended to by the family. PAST MEDICAL HISTORY:  Anxiety disorder, COPD, degenerative joint disease, hyperlipidemia, hypertension and questionable history of acid reflux disease not substantiated by history from the patient or family. She has had a soft tissue neuroma x2 removed from the right foot, carpal tunnel release in the past, cholecystectomy, hysterectomy, tonsillectomy, first cervical rib removed. ALLERGIES:  LISINOPRIL CAUSES COUGH. MEDICATIONS:  At the time of this admission Norco 1 tablet every hour as needed for pain, Flexeril 10 mg 3 times a day as needed, Lexapro 20 mg once a day, Advair Diskus 1 puff inhalation every 12 hours. Hyzaar HCTZ 125 mg 1 tablet daily, fish oil 1 tablet daily,  Zofran 8 mg disintegrating tablet under the tongue every 8 hours as needed for nausea and Lyrica 50 mg 3 times a day. SOCIAL HISTORY:  Patient is single, 2 adult children at the bedside. Former smoker, quit several years ago, had been using electric cigarettes since 04/2013. No heavy alcohol or regular alcohol in the past.  No recreational drugs. She does have tattoos. FAMILY HISTORY:  ASCVD, hypertension, diabetes, thyroid disease.   No colon cancer, polyps, inflammatory bowel disease or chronic liver disease to the patient's or family's recollection. REVIEW OF SYSTEMS:  A complete review was taken, positive as per history of present illness. Negatives will not be listed here. PHYSICAL EXAMINATION:  GENERAL:  The patient is a pleasant, somewhat restless female appearing to be her stated age, in no acute distress. Nasogastric tube in the left naris draining reddish coffee-ground material in small amounts. NECK:  Supple, no mass or thyroid palpable. NODES:  No supraclavicular, axillary or cervical nodes palpable. CHEST:  Clear to auscultation. Symmetric breath sounds, somewhat diminished air movement. CARDIOVASCULAR:  First and second heart sounds are normal.  No murmurs, rubs or gallops. ABDOMEN:  Soft, nontender, no organomegaly or mass palpable. Bowel sounds are normal.  There are no bruits. EXTREMITIES:  No pedal edema. RECTAL:  Examination not performed. LABORATORY DATA:  From today show white count of 14.6, hemoglobin 12.1, hematocrit of 36.2 and MCV of 90, platelet count 841. On the 5th of this month, white count was 17.1, hemoglobin 16.2, hematocrit 47.4, platelet count 549. Yesterday's hemoglobin and hematocrit 12.4 and 36.7, a white count of 16.8, MCV is 89.3, platelet count 711. BMP today, potassium is low at 2.7, creatinine is low 0.59, calcium is low at 0.1, ionized low at 1.08, phosphorus low at 1.8, magnesium normal at 0.8. On September 9th, the patient's BUN was 19, creatinine was elevated 1.49. Liver enzymes were normal including a total bilirubin 0.7, total protein 8.2 and albumin of 4.4, an ALT of 33 and AST of 24, alkaline phosphatase was slightly elevated at 131. CPK was 68. Troponin less than 0.02.     CT angiogram of the head and neck:  Motion artifact limits examination; degenerative change in the spine; air-fluid level in the right maxillary antrum; mild mucoperiosteal thickening in the ethmoid air cells; mastoid air cells unremarkable; a mild amount of atherosclerotic calcification in the aortic arch; no high-grade proximal great vessel origin stenosis is seen. Mild narrowing of the proximal right subclavian artery origin is noted. The left common carotid artery is mildly irregular, a left carotid bifurcation moderately irregular. No intracranial aneurysm or vascular malformation identified to suggest an etiology  of the previously seen small volume subarachnoid hemorrhage; mild to moderate irregularity involving the bilateral proximal ICA estimated at 40% stenosis on the left, 22% on the right; no definite high-grade intracranial stenosis. Mild multifocal irregular narrowing both the anterior and posterior circulation greatest involving the bilateral PCA, nonspecific finding, no evidence of dural venous sinus thrombus. IMPRESSION:  1.  New onset seizure with some subarachnoid hemorrhage present, etiology uncertain. 2.  Bloody coffee-ground NG aspirate most likely secondary to trauma of the esophagus and stomach related to the nasogastric tube itself and also suctioning. 3.  Questionable history of acid reflux disease, not substantiated by the patient's family or the patient. 4.  Apparently, the patient had a colonoscopy at age 47, was asked to return in 8 years. I do not have the primary data for my review. PLAN:  1. Would discontinue the patient's nasogastric tube as soon as felt to be appropriate given the patient's neurologic functioning. 2.  Begin oral feeding. 3.  I have no objection to impair a PPI. 4.  No plans for endoscopic evaluation while in the hospital unless the patient develops hemodynamically significant bleeding. 5.  The patient should return to see us in the office after discharge. We will reassess the issue of acid reflux disease, verify that she is up to date on colonoscopies and make recommendations for appropriate followup as necessary.   Of course, will be available should the patient develop a hemodynamically significant bleed while she is in the hospital.  In the meantime, would discontinue nasogastric tube as soon as possible to prevent ongoing trauma to the esophagus and stomach.       MD IRVIN Cee / HN  D: 09/12/2018 16:13     T: 09/12/2018 17:44  JOB #: [id number]  CC: Susannah Newman MD

## 2018-09-12 NOTE — MED STUDENT NOTES
*ATTENTION:  This note has been created by a medical student for educational purposes only. Please do not refer to the content of this note for clinical decision-making, billing, or other purposes. Please see attending physicians note to obtain clinical information on this patient. * *ATTENTION:  This note has been created by a medical student for educational purposes only. Please do not refer to the content of this note for clinical decision-making, billing, or other purposes. Please see attending physicians note to obtain clinical information on this patient. * Student Progress Note Please refer to attendings daily rounding note for full details Patient: Vincent Lennox MRN: [id number]  CSN: [id number] YOB: 1960  Age: 60 y.o. Sex: female DOA: 9/9/2018 LOS:  LOS: 3 days Chief Complaint:  headache Subjective:  
  
Ms. Marta Vale is a 60year old  female who presented on 9/9 with a headache located on the top of her head. She has had a prior history of the same headache but with bowel movements. Upon presentation to the ED she denied changes in vision, nausea, vomiting, diarrhea, chest pain, fever, cough and weakness. She did experience numbness and diffuse body pain along with cramps in her hands. During her time in the ED she underwent a tonic clonic seizure. She has a PMH of anxiety, COPD, DJD of cervical spine, HLD, GERD and HTN. She takes lexapro, pregabalin, norco, fluticasone-salmeterol, fish oil, cyclobenzanine, losartan-hydrochlorothiazide and alprazolam.  Upon admission she was given acetaminophen, albuterol ipratropium, amlodipine, aformoterol, bacitracin, budesonide, ceftriaxone, hydralazine, hydrochlorothiazide, labetolol, lacosamide, lorazepam, protonix and valproate. She is single, quit smoking in 4/2013, uses electronic cigarettes and denies alcohol.   Her mother has heart disease, HTN and DM. Her maternal aunt has HTN and DM. Her sister has thyroid disease. Today she was asleep and sedated during the encounter. Her daughters remarked that the physicians tried to wean her off of sedation but she started having seizure like activity so she was sedated again. ROS: unable to ask d/t sedation/asleep Objective:  
  
Visit Vitals  /59  Pulse 93  Temp 98.5 °F (36.9 °C)  Resp 18  Ht 5' 5\" (1.651 m)  Wt 104.1 kg (229 lb 8 oz)  SpO2 95%  BMI 38.19 kg/m2 Physical Exam: 
Gen:  Well developed and well nourished female with no signs of acute distress. Asleep, sedated and not alert. Patient is diaphoretic HEENT: Head atraumatic and normocephalic. Nares patent. Trachea midline. CV: Heart RRR with normal S1 and S2. No murmurs, rubs, gallops or clicks. Resp:  Lungs CTA in all lung fields without rales, rhonchi or wheezing. Abd: Abdomen soft, non-distended and without palpable masses. Extrem:  Extremities warm and sweaty. No cyanosis, trauma or edema. Skin: Normol skin color, turgor and texture. Neuro: Patient asleep and sedated. I have reviewed the patient's Labs and Radiology studies. Assessment:  
 
A/P: 
 
Seizures 
-f/u neurology 
-depakote SAH 
-f/u neuro 
-BP control to SBP <140 HTN 
-BP control-hydrochlorothiazide-SBP <140 COPD 
-nebulized medications, steroids GERD 
-protonix Plan: Philip Garay 9/12/2018 11:11 AM

## 2018-09-12 NOTE — PROGRESS NOTES
Problem: Falls - Risk of 
Goal: *Absence of Falls Document Reza Clement Fall Risk and appropriate interventions in the flowsheet. Outcome: Progressing Towards Goal 
Fall Risk Interventions: 
  
 
Mentation Interventions: Adequate sleep, hydration, pain control Medication Interventions: Assess postural VS orthostatic hypotension Elimination Interventions: Bed/chair exit alarm Problem: Pressure Injury - Risk of 
Goal: *Prevention of pressure injury Document Glenn Scale and appropriate interventions in the flowsheet. Outcome: Progressing Towards Goal 
Pressure Injury Interventions: 
Sensory Interventions: Assess changes in LOC, Assess need for specialty bed Moisture Interventions: Absorbent underpads Activity Interventions: Increase time out of bed Mobility Interventions: PT/OT evaluation Nutrition Interventions: Document food/fluid/supplement intake Friction and Shear Interventions: Apply protective barrier, creams and emollients

## 2018-09-12 NOTE — PROGRESS NOTES
PCCM Progress Note I have reviewed the flowsheet and previous days notes. Events, vitals, medications and notes from last 24 hours reviewed. Care plan discussed with staff and on multidisciplinary rounds. Subjective: 
9/12/2018 Pt is more responsive today per her family. She is responding to her family. On my assessment pt is sleepy. Impression and Plan Status epilepticus/Seizures - Pt had recurrent ? Seizure like activity yesterday per RN. Pt was given ativan. Pt started on Vimpat by neurology and is also on Depacon. No further events overnight. Will defer mgt to Neurology. Small SAH - CTA Head shows No intracranial aneurysm or vascular malformation is identified. F/u with neurology HTN - Goal SBP is <140mmHg per Neurology. Pt is on HCTZ, prn labetalol and Hydralazine prn. Add norvasc as pt is still needing prn meds COPD - Pt is on Brovana and Pulmicort nebs Leucocytosis - Lower today than yesterday, afebrile today. Cont with Rocephin. Blood cx shows no growth so far. Hx of Anxiety DVT and GI proph - SCD and Protonix Low K and Phos - Replace Upper GI bleed - NG output is dark red. Increase protonix to BID and consult GI 
 
OTHER: 
Glycemic Control. Glucose stabilizer per ICU protocol when on insulin drip. Maintain blood glucose 140-180. Replace electrolytes per ICU electrolyte replacement protocol HOB >=30 degree elevation all the time. Aggressive pulmonary toileting. Incentive spirometry when appropriate. Aspiration precautions. Ken bundle followed, remove ken catheter when not critically ill. PT/OT eval and treat. OOB/IS when appropriate. Quality Care: Stress ulcer prophylaxis, DVT prophylaxis, HOB elevated, Infection control all reviewed and addressed. Events and notes from last 24 hours reviewed. Care plan discussed with nursing. D/w patient's family above medical problems and answered all questions to their satisfaction. CC TIME: >45 min Medication Reviewed: Allergies Allergen Reactions  Lisinopril Cough Past Medical History:  
Diagnosis Date  Abdominal pain  Anxiety  COPD (chronic obstructive pulmonary disease) (HCC)  DJD (degenerative joint disease) of cervical spine  Elevated cholesterol  GERD (gastroesophageal reflux disease) H/O--NOT ON MEDICATION AT PRESENT  
 Hypertension  Nausea and vomiting Past Surgical History:  
Procedure Laterality Date  EXCISION TUMOR SOFT TISSUE FOOT/TOE SUBQ <1.5CM  2011  
 2 m[place]artney neuroma right foot  HX CARPAL TUNNEL RELEASE  2002  HX CHOLECYSTECTOMY  10/14/2013  HX HYSTERECTOMY  1994  
 vaginal (ovaries still in) [address]  [place] REMOVAL 1ST/CERVICAL RIB  1984  
 right side 1st rib  REMOVAL OF RIB(S)  1976  
 left side 1st rib Social History Substance Use Topics  Smoking status: Former Smoker  Smokeless tobacco: Never Used Comment: stop 4/2013; uses electronic cigarette  Alcohol use No  
  
Family History Problem Relation Age of Onset  Heart Disease Mother  Hypertension Mother  Diabetes Mother  Hypertension Maternal Aunt  Diabetes Maternal Aunt  Thyroid Disease Sister Prior to Admission medications Medication Sig Start Date End Date Taking? Authorizing Provider  
escitalopram oxalate (LEXAPRO) 20 mg tablet Take 20 mg by mouth daily. Andrew Treadwell MD  
pregabalin (LYRICA) 50 mg capsule Take 50 mg by mouth three (3) times daily. Andrew Treadwell MD  
HYDROcodone-acetaminophen (NORCO) 5-325 mg per tablet Take 1 Tab by mouth every eight (8) hours as needed for Pain. 10/10/13   Eve Flanagan PA-C  
ondansetron (ZOFRAN ODT) 8 mg disintegrating tablet Take 1 Tab by mouth every eight (8) hours as needed for Nausea.  10/10/13   Eve Flanagan PA-C  
fluticasone-salmeterol (ADVAIR DISKUS) 250-50 mcg/dose diskus inhaler Take 1 Puff by inhalation every twelve (12) hours. Historical Provider  
omega-3 fatty acids-vitamin e (FISH OIL) 1,000 mg cap Take 1 Cap by mouth. Historical Provider  
cyclobenzaprine (FLEXERIL) 10 mg tablet Take  by mouth three (3) times daily as needed. Historical Provider  
losartan-hydrochlorothiazide (HYZAAR) 100-25 mg per tablet Take 1 Tab by mouth daily. Historical Provider ALPRAZolam (XANAX) 0.5 mg tablet Take  by mouth. Historical Provider Current Facility-Administered Medications Medication Dose Route Frequency  potassium chloride 10 mEq in 100 ml IVPB  10 mEq IntraVENous Q1H  
 sodium phosphate 6 mmol in 0.9% sodium chloride 250 mL infusion  6 mmol IntraVENous ONCE  
 dextrose 5% - 0.45% NaCl with KCl 20 mEq/L infusion  100 mL/hr IntraVENous CONTINUOUS  
 pantoprazole (PROTONIX) 40 mg in sodium chloride 0.9% 10 mL injection  40 mg IntraVENous Q12H  cefTRIAXone (ROCEPHIN) 1 g in 0.9% sodium chloride (MBP/ADV) 50 mL MBP  1 g IntraVENous Q24H  
 amLODIPine (NORVASC) tablet 5 mg  5 mg Oral DAILY  sodium chloride (NS) flush 10 mL  10 mL InterCATHeter Q24H  
 sodium chloride (NS) flush 10-40 mL  10-40 mL InterCATHeter Q8H  
 lacosamide (VIMPAT) 100 mg in 0.9% sodium chloride IVPB  100 mg IntraVENous BID  hydroCHLOROthiazide (HYDRODIURIL) tablet 25 mg  25 mg Oral DAILY  sodium chloride (NS) flush 5-10 mL  5-10 mL IntraVENous Q8H  
 sodium chloride (NS) flush 5-10 mL  5-10 mL IntraVENous Q8H  
 arformoterol (BROVANA) neb solution 15 mcg  15 mcg Nebulization BID RT  
 budesonide (PULMICORT) 500 mcg/2 ml nebulizer suspension  500 mcg Nebulization BID RT  
 valproate (DEPACON) 750 mg in 0.9% sodium chloride 50 mL IVPB  750 mg IntraVENous Q6H Peripheral Intravenous Line: 
Peripheral IV 09/09/18 Right Antecubital (Active) Site Assessment Clean, dry, & intact 9/10/2018  7:00 AM  
Phlebitis Assessment 0 9/10/2018  7:00 AM  
Infiltration Assessment 0 9/10/2018  7:00 AM  
 Dressing Status Clean, dry, & intact 9/10/2018  7:00 AM  
Dressing Type Transparent 9/10/2018  7:00 AM  
Hub Color/Line Status Green;Patent;Capped 9/10/2018  7:00 AM  
Action Taken Open ports on tubing capped 9/10/2018  7:00 AM  
Alcohol Cap Used Yes 9/10/2018  7:00 AM  
   
Peripheral IV 09/09/18 Left Antecubital (Active) Site Assessment Clean, dry, & intact 9/10/2018  7:00 AM  
Phlebitis Assessment 0 9/10/2018  7:00 AM  
Infiltration Assessment 0 9/10/2018  7:00 AM  
Dressing Status Clean, dry, & intact 9/10/2018  7:00 AM  
Dressing Type Transparent 9/10/2018  7:00 AM  
Hub Color/Line Status Green;Patent; Infusing 9/10/2018  7:00 AM  
Action Taken Open ports on tubing capped 9/10/2018  7:00 AM  
Alcohol Cap Used Yes 9/10/2018  7:00 AM  
   
Peripheral IV 09/09/18 Right Hand (Active) Site Assessment Clean, dry, & intact 9/10/2018  7:00 AM  
Phlebitis Assessment 0 9/10/2018  7:00 AM  
Infiltration Assessment 0 9/10/2018  7:00 AM  
Dressing Status Clean, dry, & intact 9/10/2018  7:00 AM  
Dressing Type Transparent 9/10/2018  7:00 AM  
Hub Color/Line Status Pink;Patent;Capped 9/10/2018  7:00 AM  
Action Taken Open ports on tubing capped 9/10/2018  7:00 AM  
Alcohol Cap Used Yes 9/10/2018  7:00 AM  
 
Drain(s): 
Nasogastric Tube 09/09/18 (Active) Site Assessment Clean, dry, & intact 9/10/2018  7:30 AM  
Dressing Status Clean, dry, & intact 9/10/2018  7:30 AM  
G Port Status Intermittent Suction 9/10/2018  7:30 AM  
External Insertion Evens (cms) 60 cms 9/10/2018  7:30 AM  
Action Taken Placement verified (comment) 9/10/2018  7:30 AM  
Drainage Description Michelle De Jesus 9/10/2018  7:30 AM  
Gastric Residual (mL) 0 ml 9/10/2018  7:30 AM  
Drainage Chamber Level (ml) 0 ml 9/10/2018  7:00 AM  
Output (ml) 0 ml 9/10/2018  7:30 AM  
 
Objective: 
Vital Signs:   
Visit Vitals  /59  Pulse 93  Temp 98.5 °F (36.9 °C)  Resp 18  Ht 5' 5\" (1.651 m)  Wt 104.1 kg (229 lb 8 oz)  SpO2 95%  BMI 38.19 kg/m2 O2 Device: Nasal cannula O2 Flow Rate (L/min): 2 l/min Temp (24hrs), Av.5 °F (36.9 °C), Min:98.3 °F (36.8 °C), Max:98.9 °F (37.2 °C) Intake/Output:  
Last shift:      701 - 1900 In: -  
Out: 355 [DFUKU:290] Last 3 shifts: 09/10 1901 -  07 In: 4393.7 [I.V.:4393.7] Out: 2485 [Urine:2335] Intake/Output Summary (Last 24 hours) at 18 1055 Last data filed at 18 1000 Gross per 24 hour Intake          2693.72 ml Output             2220 ml Net           473.72 ml Last 3 Recorded Weights in this Encounter 18 0500 09/10/18 4099 Weight: 111.1 kg (245 lb) 104.1 kg (229 lb 8 oz) Physical Exam:  
 
General/Neurology: Nathaneil Cuna Head:   Normocephalic, without obvious abnormality Eye:   no scleral icterus, no pallor Oral:   Mucus membranes moist 
Neck:   Supple Lung:   B/l air entry is fair Heart:   Regular rate & rhythm. S1 S2 present. Abdomen/: Soft, non tender, BS +nt Extremities:  No pedal edema Skin:   Dry, intact Data: 
   
Recent Results (from the past 24 hour(s)) CULTURE, BLOOD Collection Time: 18  1:30 PM  
Result Value Ref Range Special Requests: PERIPHERAL Culture result: NO GROWTH AFTER 17 HOURS    
CULTURE, BLOOD Collection Time: 18  1:50 PM  
Result Value Ref Range Special Requests: PERIPHERAL Culture result: NO GROWTH AFTER 17 HOURS    
CBC W/O DIFF Collection Time: 18  4:00 AM  
Result Value Ref Range WBC 14.6 (H) 4.6 - 13.2 K/uL  
 RBC 4.02 (L) 4.20 - 5.30 M/uL  
 HGB 12.1 12.0 - 16.0 g/dL HCT 36.2 35.0 - 45.0 % MCV 90.0 74.0 - 97.0 FL  
 MCH 30.1 24.0 - 34.0 PG  
 MCHC 33.4 31.0 - 37.0 g/dL  
 RDW 13.5 11.6 - 14.5 % PLATELET 866 670 - 325 K/uL MPV 11.4 9.2 - 88.2 FL  
METABOLIC PANEL, BASIC Collection Time: 18  4:00 AM  
Result Value Ref Range Sodium 144 136 - 145 mmol/L Potassium 2.7 (LL) 3.5 - 5.5 mmol/L  Chloride 104 100 - 108 mmol/L  
 CO2 32 21 - 32 mmol/L Anion gap 8 3.0 - 18 mmol/L Glucose 77 74 - 99 mg/dL BUN 8 7.0 - 18 MG/DL Creatinine 0.59 (L) 0.6 - 1.3 MG/DL  
 BUN/Creatinine ratio 14 12 - 20 GFR est AA >60 >60 ml/min/1.73m2 GFR est non-AA >60 >60 ml/min/1.73m2 Calcium 8.1 (L) 8.5 - 10.1 MG/DL  
PHOSPHORUS Collection Time: 09/12/18  4:00 AM  
Result Value Ref Range Phosphorus 1.9 (L) 2.5 - 4.9 MG/DL  
VALPROIC ACID Collection Time: 09/12/18  4:00 AM  
Result Value Ref Range Valproic acid 102 (H) 50 - 100 ug/ml CALCIUM, IONIZED Collection Time: 09/12/18  4:00 AM  
Result Value Ref Range Ionized Calcium 1.08 (L) 1.12 - 1.32 MMOL/L  
MAGNESIUM Collection Time: 09/12/18  4:00 AM  
Result Value Ref Range Magnesium 1.8 1.6 - 2.6 mg/dL Chemistry Recent Labs  
   09/12/18 
 0400  09/11/18 
 0345 GLU  77  91 NA  144  143  
K  2.7*  3.0*  
CL  104  105 CO2  32  30 BUN  8  10 CREA  0.59*  0.62  
CA  8.1*  8.1*  
MG  1.8  1.8 PHOS  1.9*  1.5* AGAP  8  8 BUCR  14  16 CBC w/Diff Recent Labs  
   09/12/18 
 0400  09/11/18 
 0345  09/10/18 
 0335 WBC  14.6*  16.8*  17.4*  
RBC  4.02*  4.11*  4.30 HGB  12.1  12.4  12.9 HCT  36.2  36.7  38.5 PLT  219  244  275 GRANS   --    --   69  
LYMPH   --    --   23 EOS   --    --   1 Micro Recent Labs  
   09/11/18 
 1350  09/11/18 
 1330  09/09/18 
 1058 CULT  NO GROWTH AFTER 17 HOURS  NO GROWTH AFTER 17 HOURS  NO GROWTH 3 DAYS  PENDING Recent Labs  
   09/11/18 
 1350  09/11/18 
 1330  09/09/18 
 1058 CULT  NO GROWTH AFTER 17 HOURS  NO GROWTH AFTER 17 HOURS  NO GROWTH 3 DAYS  PENDING  
 
 
CT (Most Recent) Results from Hospital Encounter encounter on 09/09/18 CTA HEAD NECK W WO CONT Narrative EXAM: CTA HEAD AND NECK INDICATION: Subarachnoid hemorrhage, possible aneurysm COMPARISON: MR brain 9/9/2018 TECHNIQUE:  Multiple axial CT images of the neck were obtained extending from the level of the aortic arch to the skull base after the administration of the 
IV contrast utilizing a CTA protocol. Maximum intensity projection 
reconstructions were performed in multiple planes. Multiple axial CT images of the head were obtained extending from below the 
level of the skull base to the vertex after the administration of the IV 
contrast utilizing a CTA protocol. Maximum intensity projection reconstructions 
were performed in three planes. Additional 3 D reconstructions were performed 
at a separate workstation. One or more dose reduction techniques were used on this CT: automated exposure 
control, adjustment of the mAs and/or kVp according to patient's size, and 
iterative reconstruction techniques. The specific techniques utilized on this CT 
exam have been documented in the patient's electronic medical record. FINDINGS: 
 
Motion artifact is present which limits evaluation. CTA NECK Mild amount of atherosclerotic calcifications are present along the aortic arch. No high-grade proximal great vessel origin stenosis is seen. Mild narrowing of 
the proximal right subclavian artery origin is noted. The left common carotid artery is mildly irregular. The left carotid 
bifurcation is moderately irregular. Estimated minimal luminal diameter proximal 
left ICA 2.7 mm. Estimated luminal diameter of normal left ICA cervical segment 
is 5.1 mm. Estimated degree of stenosis of the left ICA based upon NASCET 
criteria is 48%. Remainder of left ICA cervical segment is minimally irregular. The right common carotid artery is slightly irregular. The right carotid 
bifurcation is mildly irregular. Estimated minimal luminal diameter proximal 
right ICA 3.7 mm. Estimated luminal diameter of normal right ICA cervical 
segment is 4.7 mm. Estimated degree of stenosis of the right ICA based upon NASCET criteria is 22%.   Remainder of right ICA cervical segment is a normal 
 variant vascular loop without focal stenosis. Left vertebral artery is mildly dominant. No focal vertebral artery stenosis is 
seen. Degenerative changes are seen in the spine. Nasogastric tube is present 
extending inferiorly out of the field-of-view in the esophagus. Air-fluid level is present in the right maxillary antrum. Additional right 
frontal sinus air-fluid level is present. Mild mucoperiosteal thickening seen in 
the ethmoidal air cells. Mastoid air cells are unremarkable. CTA HEAD There is no focal high-grade stenosis within the intracranial arteries. Mild 
atherosclerotic irregularity is seen in the bilateral carotid siphons without 
high grade stenosis. The carotid terminus is unremarkable. No high-grade ANDREA stenosis is seen. Very small anterior to indicating artery is 
suggested. Slight irregularity seen in the bilateral MCA vessels mostly along 
distal branches. Left vertebral artery is mildly dominant intracranially. PICA origins are 
unremarkable. Mild tortuosity is present involving the basilar artery without 
focal stenosis. Mild irregularity is seen in bilateral PCA branches distally. The dural venous sinuses are patent. Sharply defined rounded hypodensities are 
present distally in the transverse sinuses bilaterally compatible with 
pacchionian arachnoid granulation. No abnormal vessels are seen towards the 
vertex on the right in location of previously suggested small volume 
subarachnoid hemorrhage. 
 
 
_________________________ Impression IMPRESSION: 
 
1. No intracranial aneurysm or vascular malformation is identified to suggest 
etiology of previously seen small volume subarachnoid hemorrhage. 2.  Mild to moderate irregularity involving bilateral proximal ICA, estimated 48% stenosis on the left and 22% stenosis on the right, based on NASCET 
criteria. 3. No high-grade vertebral artery stenosis. 4. No definite high-grade intracranial stenosis. Mild multifocal irregularity 
and narrowing involving both anterior and posterior circulation, greatest 
involving the bilateral PCA. This finding is nonspecific but may represent 
intracranial atherosclerotic disease. Difficult to exclude other vasculitic 
processes. 5. No evidence of dural venous sinus thrombosis. XR (Most Recent). CXR reviewed by me and compared with previous CXR Results from Hospital Encounter encounter on 09/09/18 XR ABD PORT  1 V Narrative Abdomen, single view COMPARISON: 4/18/2016 INDICATION: Nasogastric tube placement FINDINGS: Supine AP portable view the abdomen obtained and interpreted with 
comparison as above. Nasogastric tube in position with tip and side-port within 
the stomach body. Minor atelectatic streaks are present at the lung bases which 
are otherwise clear. There are several right upper quadrant surgical clips 
noted. No acute osseous abnormality. Impression IMPRESSION: 
1. Nasogastric tube appropriately positioned within the stomach. 2. Minor atelectasis at the lung bases. Note: Preliminary report sent to the patient care division by the radiology 
resident at the time of the study. High complexity decision making was performed during the evaluation of this patient at high risk for decompensation with multiple organ involvement Above mentioned total time spent on reviewing the case/medical record/data/notes/EMR/patient examination/documentation/coordinating care with nurse/consultants, exclusive of procedures with complex decision making performed and > 50% time spent in face to face evaluation. Angelita Buckley MD 
9/12/2018

## 2018-09-12 NOTE — PROGRESS NOTES
[facility] Hospitalist Division Inpatient Daily Progress Note Patient: Karol Sapp MRN: [id number]  CSN: [id number] YOB: 1960  Age: 60 y.o. Sex: female DOA: 9/9/2018 LOS:  LOS: 3 days Chief Complaint:  H/A, muscle spasms Interval History: PMHx of COPD, anxiety, HTN, GERD; presented to ED with c/o severe H/A and muscle spasms. Pt was stable in ED with teleneuro consult with plans for observation and MRI. Pt started to have actively seizure w/ tonic-clonic activity and eye deviation to the left. 8mg of ativan given, but refractory. Keppra load refractory. Depakote load of 1500mg; 1500 mg PB. Neurology consulted. LP completed-CSF negative tubes 1&4. CT head negative. History of isolated WBCs. Recently c/o left sided numbness per pt's daughter-had been prescribed lyrica and flexeril. Takes xanax nightly. No h/o seizure. SBP elevated in ED. MRI c/w small area of subarachnoid hemorrhage on cortical surface right frontal lobe plus patchy brain edema, possible 2/2 malignant hypertension. UDS + benzo, thc. Fungal prep negative. Cryptococcal antigen negative. Tube 1: 0 WBCs and 21 RBCs. Tube 4: 0 WBCs and 111 RBCs. Protein 40, glucose 71.  
9/10/18: EEG completed-results pending. Family updated. SBP < 140-prn increased; added cozaar. Await neurology recommendations. Need to address feeding-TF? -will defer ICU. Start phenobarbital wean and check levels. Acyclovir stopped 2/2 CSF WBC count 0, CSF glucose and protein levels normal.  
9/11/18: EEG completed-per neurology's reading-Abnormal recording revealing the presence of moderate slowing of the background activity consistent with encephalopathy. This is a nonspecific finding and may be due to toxic, metabolic or inflammatory causes. No epileptiform activity was noted. (per neuro reading).  Following commands today; able to verbalize, open eyes to name. Restless-sitter ordered. Pending CSF studies paraneoplastic profile, NMDA receptor antibodies, HSV1-2 PCR. WBCs w/ slight improvement. Replace K-lytes protocol added. Phenobarbital level check in. SBP < 140 (currently ok). Urinary retention-straight cath X2, bladder scan prn, place ken if needed (but would overall try to avoid w/ elevated white count and unclear etiology?). Will need PT/OT/Speech when improved for discharge planning. Assess feeding-has NG tube. Echo ordered, but not yet completed. Continue to follow neuro status-slowly improving. 9/12/18: Patient following commands, but still restless and states she is \"falling out of bed\". Speech still garbled and hard to understand. Wbc's slight improvement; afebrile. Lytes off; replace per protocol. K continues low, will add K to fluid as well. Keep SBP < 140. Blood cultures NGTD. HSV 1/2 negative. Other serologies pending. Will await neurology recommendations. PB d/c'd yesterday. Ativan d/c'd but then given for seizure like activity per RN staff during night. Subjective: Following commands w/ repetitive prompting; opens eyes to voice Restless Objective:  
  
Visit Vitals  /74  Pulse 93  Temp 98.9 °F (37.2 °C)  Resp 22  
 Ht 5' 5\" (1.651 m)  Wt 104.1 kg (229 lb 8 oz)  SpO2 99%  BMI 38.19 kg/m2 Physical Exam: 
General appearance: restless, moving all ext., speech garbled Lungs: clear to auscultation bilaterally Heart: regular rate and rhythm, S1, S2 normal 
Abdomen: soft, non tender, non distended. Normoactive bowel sounds. Extremities: extremities normal, atraumatic, no cyanosis or edema Skin: Skin color, texture, turgor normal.  
Neurologic: Follows commands, PERRL-2mm, opens eyes to voice, responds to name-was able to state she was in [address] and Output: 
Current Shift:    
Last three shifts:  09/10 1901 - 09/12 0700 In: 4393.7 [I.V.:4393.7] Out: 2485 [Urine:2335] Recent Results (from the past 24 hour(s)) CULTURE, BLOOD Collection Time: 09/11/18  1:30 PM  
Result Value Ref Range Special Requests: PERIPHERAL Culture result: PENDING   
CULTURE, BLOOD Collection Time: 09/11/18  1:50 PM  
Result Value Ref Range Special Requests: PERIPHERAL Culture result: PENDING   
CBC W/O DIFF Collection Time: 09/12/18  4:00 AM  
Result Value Ref Range WBC 14.6 (H) 4.6 - 13.2 K/uL  
 RBC 4.02 (L) 4.20 - 5.30 M/uL  
 HGB 12.1 12.0 - 16.0 g/dL HCT 36.2 35.0 - 45.0 % MCV 90.0 74.0 - 97.0 FL  
 MCH 30.1 24.0 - 34.0 PG  
 MCHC 33.4 31.0 - 37.0 g/dL  
 RDW 13.5 11.6 - 14.5 % PLATELET 101 278 - 622 K/uL MPV 11.4 9.2 - 59.3 FL  
METABOLIC PANEL, BASIC Collection Time: 09/12/18  4:00 AM  
Result Value Ref Range Sodium 144 136 - 145 mmol/L Potassium 2.7 (LL) 3.5 - 5.5 mmol/L Chloride 104 100 - 108 mmol/L  
 CO2 32 21 - 32 mmol/L Anion gap 8 3.0 - 18 mmol/L Glucose 77 74 - 99 mg/dL BUN 8 7.0 - 18 MG/DL Creatinine 0.59 (L) 0.6 - 1.3 MG/DL  
 BUN/Creatinine ratio 14 12 - 20 GFR est AA >60 >60 ml/min/1.73m2 GFR est non-AA >60 >60 ml/min/1.73m2 Calcium 8.1 (L) 8.5 - 10.1 MG/DL  
PHOSPHORUS Collection Time: 09/12/18  4:00 AM  
Result Value Ref Range Phosphorus 1.9 (L) 2.5 - 4.9 MG/DL  
CALCIUM, IONIZED Collection Time: 09/12/18  4:00 AM  
Result Value Ref Range Ionized Calcium 1.08 (L) 1.12 - 1.32 MMOL/L  
MAGNESIUM Collection Time: 09/12/18  4:00 AM  
Result Value Ref Range Magnesium 1.8 1.6 - 2.6 mg/dL Lab Results Component Value Date/Time Glucose 77 09/12/2018 04:00 AM  
 Glucose 91 09/11/2018 03:45 AM  
 Glucose 162 (H) 09/09/2018 04:45 AM  
 Glucose 147 (H) 09/05/2018 12:55 PM  
 Glucose 113 (H) 10/13/2013 06:09 AM  
 EXAM: CTA HEAD AND NECK 
  INDICATION: Subarachnoid hemorrhage, possible aneurysm 
  
COMPARISON: MR brain 9/9/2018 
  
 TECHNIQUE:  Multiple axial CT images of the neck were obtained extending from 
the level of the aortic arch to the skull base after the administration of the 
IV contrast utilizing a CTA protocol. Maximum intensity projection 
reconstructions were performed in multiple planes. Multiple axial CT images of the head were obtained extending from below the 
level of the skull base to the vertex after the administration of the IV 
contrast utilizing a CTA protocol. Maximum intensity projection reconstructions 
were performed in three planes. Additional 3 D reconstructions were performed 
at a separate workstation. One or more dose reduction techniques were used on this CT: automated exposure 
control, adjustment of the mAs and/or kVp according to patient's size, and 
iterative reconstruction techniques. The specific techniques utilized on this CT 
exam have been documented in the patient's electronic medical record. 
  
FINDINGS: 
  
Motion artifact is present which limits evaluation. 
  
CTA NECK 
  
Mild amount of atherosclerotic calcifications are present along the aortic arch. No high-grade proximal great vessel origin stenosis is seen. Mild narrowing of 
the proximal right subclavian artery origin is noted. 
  
The left common carotid artery is mildly irregular. The left carotid 
bifurcation is moderately irregular. Estimated minimal luminal diameter proximal 
left ICA 2.7 mm. Estimated luminal diameter of normal left ICA cervical segment 
is 5.1 mm. Estimated degree of stenosis of the left ICA based upon NASCET 
criteria is 48%. Remainder of left ICA cervical segment is minimally irregular. 
  
The right common carotid artery is slightly irregular. The right carotid 
bifurcation is mildly irregular. Estimated minimal luminal diameter proximal 
right ICA 3.7 mm. Estimated luminal diameter of normal right ICA cervical 
segment is 4.7 mm. Estimated degree of stenosis of the right ICA based upon NASCET criteria is 22%. Remainder of right ICA cervical segment is a normal 
variant vascular loop without focal stenosis. 
  
Left vertebral artery is mildly dominant. No focal vertebral artery stenosis is 
seen. 
  
Degenerative changes are seen in the spine. Nasogastric tube is present 
extending inferiorly out of the field-of-view in the esophagus. 
  
Air-fluid level is present in the right maxillary antrum. Additional right 
frontal sinus air-fluid level is present. Mild mucoperiosteal thickening seen in 
the ethmoidal air cells. Mastoid air cells are unremarkable. 
  
CTA HEAD 
  
There is no focal high-grade stenosis within the intracranial arteries. Mild 
atherosclerotic irregularity is seen in the bilateral carotid siphons without 
high grade stenosis. The carotid terminus is unremarkable.  
  
No high-grade ANDREA stenosis is seen. Very small anterior to indicating artery is 
suggested. Slight irregularity seen in the bilateral MCA vessels mostly along 
distal branches. 
  
Left vertebral artery is mildly dominant intracranially. PICA origins are 
unremarkable. Mild tortuosity is present involving the basilar artery without 
focal stenosis. Mild irregularity is seen in bilateral PCA branches distally. The dural venous sinuses are patent. Sharply defined rounded hypodensities are 
present distally in the transverse sinuses bilaterally compatible with 
pacchionian arachnoid granulation. No abnormal vessels are seen towards the 
vertex on the right in location of previously suggested small volume 
subarachnoid hemorrhage. 
  
  
_________________________ 
  
IMPRESSION IMPRESSION: 
  
1. No intracranial aneurysm or vascular malformation is identified to suggest 
etiology of previously seen small volume subarachnoid hemorrhage. 
  
2.  Mild to moderate irregularity involving bilateral proximal ICA, estimated 48% stenosis on the left and 22% stenosis on the right, based on NASCET 
criteria.  
  
 3. No high-grade vertebral artery stenosis. 
  
4. No definite high-grade intracranial stenosis. Mild multifocal irregularity 
and narrowing involving both anterior and posterior circulation, greatest 
involving the bilateral PCA. This finding is nonspecific but may represent 
intracranial atherosclerotic disease. Difficult to exclude other vasculitic 
processes. 
  
5. No evidence of dural venous sinus thrombosis. Assessment/Plan:  
 
Patient Active Problem List  
Diagnosis Code  DJD (degenerative joint disease) M19.90  
 HTN (hypertension) I10  
 COPD (chronic obstructive pulmonary disease) ([facility] J44.9  Status post laparoscopic cholecystectomy Z90.49  Tachycardia R00.0  Status epilepticus ([facility] G40.901  Anxiety F41.9  GERD (gastroesophageal reflux disease) K21.9  Acute kidney failure (HCC) N17.9  Erythrocytosis D75.1  Lymphocytosis D72.820  
 Positive urine drug screen R82.5  Postictal state ([facility] R56.9 A/P: 
 
Seizure Activity -neuro following-appreciate  
-PB, depakote-PB d/c'd yesterday  
-seizure precautions- 
-CT head negative -EEG completed 9/10-IMPRESSION:  Abnormal recording revealing the presence of moderate slowing of the background activity consistent with encephalopathy. This is a nonspecific finding and may be due to toxic, metabolic or inflammatory causes. No epileptiform activity was noted. (per neuro reading) -MRI c/w small area of subarachnoid hemorrhage on cortical surface right frontal lobe plus patchy brain edema, possible 2/2 malignant hypertension. -LP completed-CSF tubes 1 & 4 negative ; acyclovir d/c'd  
-will await further recommendations from neurology  
-sitter at bedside Subarachnoid bleed 
-neurology following -appreciate  
-MRI showed small area of subarachnoid hemorrhage on cortical surface right frontal lobe plus patchy brain edema, possible 2/2 malignant hypertension. -SBP < 140   
-ECHO report pending -CTA negative for aneurysm HTN 
-keep SBP < 140  
-norvasc 5mg started  
-increased prn, added scheduled hydrochlorathiazide  
-hold cozaar until kidney numbers improve COPD 
-sat goal > 90% (titrate down O2 as tolerated) -on 2L NC  
-PCCM following 
-pulmicort/brovana Urinary retention 
-straight cath X 2 
-bladder scan prn 
-pt w/ ken-will reassess at removal  
 
GERD  
-protonix DVT prophylaxis 
-SCDs GI-protonix Kemal Trinh Need to address feeding status  
lyte protocol added-replace K, phosphorus Transfer out of ICU? SANGEETHA Palmer, NP-C [facility] Hospitalist Division NICIJ:[phone] Office:  [phone]

## 2018-09-12 NOTE — PROGRESS NOTES
Progress Note Patient: Rosana Jean-Baptiste MRN: [id number]  SSN: [id number] YOB: 1960  Age: 60 y.o. Sex: female Admit Date: 9/9/2018 LOS: 3 days Subjective: The patient had breakthrough seizure activity per nursing yesterday afternoon after prn Ativan stopped. The patient was loaded with Vimpat and prn Ativan was restarted. Per nursing, no more seizure activity since loaded with Vimpat, and the patient is waking up. Still mildly confused, however. She was very restless and uncomfortable overnight, and was given Ativan for that, which helped. GI consulted because NG tube aspirate red. Labs: WBC ct 14.6, improved. K+ low at 2.7. PO4 low at 1.9, Mg normal at 1.8. Depakote level slightly supratherapeutic at 102 this AM. CSF HSV PCR negative. Current meds:  Rocephin, Vimpat 100 mg IV bid, Depacon 750 mg IV qid. Patient remains very restless in bed. Says that she is uncomfortable but denies pain. Wants to sit up in bed. Objective:  
 
Vitals:  
 09/12/18 0900 09/12/18 0911 09/12/18 1000 09/12/18 1200 BP: (!) 154/130  145/59 Pulse: (!) 103  93 Resp: 21  18 Temp:    98.4 °F (36.9 °C) SpO2: 100% 99% 95% Weight:      
Height:      
  
 
Physical Exam:  
Alert, restless. Speech mildly dysarthric. Recognized daughter but gave wrong name. PERRL, EOMI, face symmetric, hearing intact to voice. Moved all 4 extremities well to command. Lab/Data Review: 
Recent Results (from the past 48 hour(s)) CBC W/O DIFF Collection Time: 09/11/18  3:45 AM  
Result Value Ref Range WBC 16.8 (H) 4.6 - 13.2 K/uL  
 RBC 4.11 (L) 4.20 - 5.30 M/uL  
 HGB 12.4 12.0 - 16.0 g/dL HCT 36.7 35.0 - 45.0 % MCV 89.3 74.0 - 97.0 FL  
 MCH 30.2 24.0 - 34.0 PG  
 MCHC 33.8 31.0 - 37.0 g/dL  
 RDW 13.6 11.6 - 14.5 % PLATELET 668 371 - 316 K/uL MPV 10.5 9.2 - 91.8 FL  
METABOLIC PANEL, BASIC  Collection Time: 09/11/18  3:45 AM  
 Result Value Ref Range Sodium 143 136 - 145 mmol/L Potassium 3.0 (L) 3.5 - 5.5 mmol/L Chloride 105 100 - 108 mmol/L  
 CO2 30 21 - 32 mmol/L Anion gap 8 3.0 - 18 mmol/L Glucose 91 74 - 99 mg/dL BUN 10 7.0 - 18 MG/DL Creatinine 0.62 0.6 - 1.3 MG/DL  
 BUN/Creatinine ratio 16 12 - 20 GFR est AA >60 >60 ml/min/1.73m2 GFR est non-AA >60 >60 ml/min/1.73m2 Calcium 8.1 (L) 8.5 - 10.1 MG/DL  
VALPROIC ACID Collection Time: 09/11/18  3:45 AM  
Result Value Ref Range Valproic acid 89 50 - 100 ug/ml PHENOBARBITAL Collection Time: 09/11/18  3:45 AM  
Result Value Ref Range Phenobarbital 19.9 15 - 35 mcg/ml MAGNESIUM Collection Time: 09/11/18  3:45 AM  
Result Value Ref Range Magnesium 1.8 1.6 - 2.6 mg/dL PHOSPHORUS Collection Time: 09/11/18  3:45 AM  
Result Value Ref Range Phosphorus 1.5 (L) 2.5 - 4.9 MG/DL  
CULTURE, BLOOD Collection Time: 09/11/18  1:30 PM  
Result Value Ref Range Special Requests: PERIPHERAL Culture result: NO GROWTH AFTER 17 HOURS    
CULTURE, BLOOD Collection Time: 09/11/18  1:50 PM  
Result Value Ref Range Special Requests: PERIPHERAL Culture result: NO GROWTH AFTER 17 HOURS    
CBC W/O DIFF Collection Time: 09/12/18  4:00 AM  
Result Value Ref Range WBC 14.6 (H) 4.6 - 13.2 K/uL  
 RBC 4.02 (L) 4.20 - 5.30 M/uL  
 HGB 12.1 12.0 - 16.0 g/dL HCT 36.2 35.0 - 45.0 % MCV 90.0 74.0 - 97.0 FL  
 MCH 30.1 24.0 - 34.0 PG  
 MCHC 33.4 31.0 - 37.0 g/dL  
 RDW 13.5 11.6 - 14.5 % PLATELET 456 351 - 064 K/uL MPV 11.4 9.2 - 58.5 FL  
METABOLIC PANEL, BASIC Collection Time: 09/12/18  4:00 AM  
Result Value Ref Range Sodium 144 136 - 145 mmol/L Potassium 2.7 (LL) 3.5 - 5.5 mmol/L Chloride 104 100 - 108 mmol/L  
 CO2 32 21 - 32 mmol/L Anion gap 8 3.0 - 18 mmol/L Glucose 77 74 - 99 mg/dL BUN 8 7.0 - 18 MG/DL  Creatinine 0.59 (L) 0.6 - 1.3 MG/DL  
 BUN/Creatinine ratio 14 12 - 20    
 GFR est AA >60 >60 ml/min/1.73m2 GFR est non-AA >60 >60 ml/min/1.73m2 Calcium 8.1 (L) 8.5 - 10.1 MG/DL  
PHOSPHORUS Collection Time: 09/12/18  4:00 AM  
Result Value Ref Range Phosphorus 1.9 (L) 2.5 - 4.9 MG/DL  
VALPROIC ACID Collection Time: 09/12/18  4:00 AM  
Result Value Ref Range Valproic acid 102 (H) 50 - 100 ug/ml CALCIUM, IONIZED Collection Time: 09/12/18  4:00 AM  
Result Value Ref Range Ionized Calcium 1.08 (L) 1.12 - 1.32 MMOL/L  
MAGNESIUM Collection Time: 09/12/18  4:00 AM  
Result Value Ref Range Magnesium 1.8 1.6 - 2.6 mg/dL GLUCOSE, POC Collection Time: 09/12/18 12:51 PM  
Result Value Ref Range Glucose (POC) 95 70 - 110 mg/dL Assessment:  
 
Principal Problem: 
  Status epilepticus ([facility] (9/9/2018) Active Problems: 
  HTN (hypertension) (9/27/2013) COPD (chronic obstructive pulmonary disease) ([facility] (9/27/2013) Tachycardia (9/9/2018) Anxiety (9/9/2018) GERD (gastroesophageal reflux disease) (9/9/2018) Overview: H/O--NOT ON MEDICATION AT PRESENT Acute kidney failure ([facility] (9/9/2018) Erythrocytosis (9/9/2018) Lymphocytosis (9/9/2018) Positive urine drug screen (9/9/2018) Overview: THC Postictal state ([facility] (9/9/2018) Plan: 1. PRES. BP under better control. 2.  Small SAH, unclear etiology. Will check MRI brain with contrast and seizure protocol to confirm improvement in PRES and cessation of any bleeding. CTA did not show an aneurysm. Ordered Ativan 2 mg IV prior to MRI due to patient restlessness. 3.  Seizures due to #1 and #2 above. On Depacon; will reduce dose to 750 mg IV tid, check level in AM.  Continue Vimpat 100 mg IV bid. Ativan prn seizures activity, insomnia. Possibly transfer to floor tomorrow. Discussed above with daughters at length. Signed By: Madhuri Sinclair MD   
 September 12, 2018

## 2018-09-12 NOTE — PROGRESS NOTES
[phone] conducted a short Follow-up Visit and Spiritual Assessment  for Nara Lagos, who is a 60 y.o.,female. Patient was awake for a short period of time.  was able to offer spiritual support. Then she drifted off to sleep. Other family members were at bedside.  said he will follow-up later today. Daughter expressed gratitude. 1615 Attempted a follow-up visit, but nurses are busy with patient. Chaplains will continue to follow and provide pastoral care as needed or requested.  recommends bedside caregivers page  on duty if patient shows signs of acute spiritual or emotional distress. The Rev. Romario Majano [address] Spiritual Care Services [address] [facility] [facility] [phone] / [facility] [phone]

## 2018-09-13 ENCOUNTER — APPOINTMENT (OUTPATIENT)
Dept: MRI IMAGING | Age: 58
DRG: 064 | End: 2018-09-13
Attending: PSYCHIATRY & NEUROLOGY
Payer: COMMERCIAL

## 2018-09-13 ENCOUNTER — APPOINTMENT (OUTPATIENT)
Dept: CT IMAGING | Age: 58
DRG: 064 | End: 2018-09-13
Attending: PSYCHIATRY & NEUROLOGY
Payer: COMMERCIAL

## 2018-09-13 LAB
ANION GAP SERPL CALC-SCNC: 6 MMOL/L (ref 3–18)
ANTI-HU AB, PNEO1T: NORMAL TITER
ANTI-RI AB, PNEO2T: NORMAL TITER
BUN SERPL-MCNC: 7 MG/DL (ref 7–18)
BUN/CREAT SERPL: 13 (ref 12–20)
CALCIUM SERPL-MCNC: 8.6 MG/DL (ref 8.5–10.1)
CHLORIDE SERPL-SCNC: 104 MMOL/L (ref 100–108)
CO2 SERPL-SCNC: 31 MMOL/L (ref 21–32)
CREAT SERPL-MCNC: 0.55 MG/DL (ref 0.6–1.3)
ERYTHROCYTE [DISTWIDTH] IN BLOOD BY AUTOMATED COUNT: 13.6 % (ref 11.6–14.5)
GLUCOSE BLD STRIP.AUTO-MCNC: 111 MG/DL (ref 70–110)
GLUCOSE SERPL-MCNC: 86 MG/DL (ref 74–99)
HCT VFR BLD AUTO: 34.3 % (ref 35–45)
HGB BLD-MCNC: 11.3 G/DL (ref 12–16)
MCH RBC QN AUTO: 29.7 PG (ref 24–34)
MCHC RBC AUTO-ENTMCNC: 32.9 G/DL (ref 31–37)
MCV RBC AUTO: 90.3 FL (ref 74–97)
PLATELET # BLD AUTO: 199 K/UL (ref 135–420)
PMV BLD AUTO: 10.3 FL (ref 9.2–11.8)
POTASSIUM SERPL-SCNC: 3.6 MMOL/L (ref 3.5–5.5)
POTASSIUM SERPL-SCNC: 4.1 MMOL/L (ref 3.5–5.5)
RBC # BLD AUTO: 3.8 M/UL (ref 4.2–5.3)
SODIUM SERPL-SCNC: 141 MMOL/L (ref 136–145)
VALPROATE SERPL-MCNC: 85 UG/ML (ref 50–100)
WBC # BLD AUTO: 14 K/UL (ref 4.6–13.2)

## 2018-09-13 PROCEDURE — 65660000000 HC RM CCU STEPDOWN

## 2018-09-13 PROCEDURE — 80164 ASSAY DIPROPYLACETIC ACD TOT: CPT | Performed by: PSYCHIATRY & NEUROLOGY

## 2018-09-13 PROCEDURE — 74011250637 HC RX REV CODE- 250/637: Performed by: HOSPITALIST

## 2018-09-13 PROCEDURE — 84132 ASSAY OF SERUM POTASSIUM: CPT | Performed by: HOSPITALIST

## 2018-09-13 PROCEDURE — 74011250637 HC RX REV CODE- 250/637: Performed by: INTERNAL MEDICINE

## 2018-09-13 PROCEDURE — 74011250637 HC RX REV CODE- 250/637: Performed by: PSYCHIATRY & NEUROLOGY

## 2018-09-13 PROCEDURE — C9254 INJECTION, LACOSAMIDE: HCPCS | Performed by: PSYCHIATRY & NEUROLOGY

## 2018-09-13 PROCEDURE — 74011000258 HC RX REV CODE- 258: Performed by: INTERNAL MEDICINE

## 2018-09-13 PROCEDURE — 74011000250 HC RX REV CODE- 250: Performed by: INTERNAL MEDICINE

## 2018-09-13 PROCEDURE — 74011250636 HC RX REV CODE- 250/636: Performed by: INTERNAL MEDICINE

## 2018-09-13 PROCEDURE — C9113 INJ PANTOPRAZOLE SODIUM, VIA: HCPCS | Performed by: INTERNAL MEDICINE

## 2018-09-13 PROCEDURE — 74011000250 HC RX REV CODE- 250: Performed by: PSYCHIATRY & NEUROLOGY

## 2018-09-13 PROCEDURE — 70450 CT HEAD/BRAIN W/O DYE: CPT

## 2018-09-13 PROCEDURE — 74011250636 HC RX REV CODE- 250/636: Performed by: NURSE PRACTITIONER

## 2018-09-13 PROCEDURE — 74011000258 HC RX REV CODE- 258: Performed by: HOSPITALIST

## 2018-09-13 PROCEDURE — 74011250637 HC RX REV CODE- 250/637: Performed by: NURSE PRACTITIONER

## 2018-09-13 PROCEDURE — 94760 N-INVAS EAR/PLS OXIMETRY 1: CPT

## 2018-09-13 PROCEDURE — 74011000258 HC RX REV CODE- 258: Performed by: PSYCHIATRY & NEUROLOGY

## 2018-09-13 PROCEDURE — 94640 AIRWAY INHALATION TREATMENT: CPT

## 2018-09-13 PROCEDURE — 85027 COMPLETE CBC AUTOMATED: CPT | Performed by: NURSE PRACTITIONER

## 2018-09-13 PROCEDURE — 77030029684 HC NEB SM VOL KT MONA -A

## 2018-09-13 PROCEDURE — 77030021352 HC CBL LD SYS DISP COVD -B

## 2018-09-13 PROCEDURE — 51798 US URINE CAPACITY MEASURE: CPT

## 2018-09-13 PROCEDURE — 74011250636 HC RX REV CODE- 250/636: Performed by: PSYCHIATRY & NEUROLOGY

## 2018-09-13 PROCEDURE — 74011250636 HC RX REV CODE- 250/636: Performed by: HOSPITALIST

## 2018-09-13 PROCEDURE — 82962 GLUCOSE BLOOD TEST: CPT

## 2018-09-13 RX ORDER — HYDROCODONE BITARTRATE AND ACETAMINOPHEN 5; 325 MG/1; MG/1
1 TABLET ORAL
Status: DISCONTINUED | OUTPATIENT
Start: 2018-09-13 | End: 2018-09-18 | Stop reason: HOSPADM

## 2018-09-13 RX ORDER — POTASSIUM CHLORIDE 1.5 G/1.77G
40 POWDER, FOR SOLUTION ORAL
Status: COMPLETED | OUTPATIENT
Start: 2018-09-13 | End: 2018-09-13

## 2018-09-13 RX ORDER — ACETAMINOPHEN 325 MG/1
650 TABLET ORAL
Status: DISCONTINUED | OUTPATIENT
Start: 2018-09-13 | End: 2018-09-18 | Stop reason: HOSPADM

## 2018-09-13 RX ORDER — HYDROCHLOROTHIAZIDE 25 MG/1
50 TABLET ORAL DAILY
Status: DISCONTINUED | OUTPATIENT
Start: 2018-09-14 | End: 2018-09-18 | Stop reason: HOSPADM

## 2018-09-13 RX ORDER — POTASSIUM CHLORIDE 7.45 MG/ML
10 INJECTION INTRAVENOUS
Status: DISCONTINUED | OUTPATIENT
Start: 2018-09-13 | End: 2018-09-13

## 2018-09-13 RX ORDER — GADOTERATE MEGLUMINE 376.9 MG/ML
20 INJECTION INTRAVENOUS
Status: DISPENSED | OUTPATIENT
Start: 2018-09-13 | End: 2018-09-13

## 2018-09-13 RX ADMIN — SODIUM CHLORIDE 40 MG: 9 INJECTION, SOLUTION INTRAMUSCULAR; INTRAVENOUS; SUBCUTANEOUS at 09:51

## 2018-09-13 RX ADMIN — SODIUM CHLORIDE 750 MG: 900 INJECTION, SOLUTION INTRAVENOUS at 22:10

## 2018-09-13 RX ADMIN — HYDROCHLOROTHIAZIDE 25 MG: 25 TABLET ORAL at 09:50

## 2018-09-13 RX ADMIN — HYDRALAZINE HYDROCHLORIDE 20 MG: 20 INJECTION INTRAMUSCULAR; INTRAVENOUS at 16:16

## 2018-09-13 RX ADMIN — Medication 10 ML: at 22:10

## 2018-09-13 RX ADMIN — POTASSIUM CHLORIDE 10 MEQ: 10 INJECTION, SOLUTION INTRAVENOUS at 01:28

## 2018-09-13 RX ADMIN — ARFORMOTEROL TARTRATE 15 MCG: 15 SOLUTION RESPIRATORY (INHALATION) at 19:03

## 2018-09-13 RX ADMIN — ARFORMOTEROL TARTRATE 15 MCG: 15 SOLUTION RESPIRATORY (INHALATION) at 08:56

## 2018-09-13 RX ADMIN — CALCIUM GLUCONATE 2 G: 94 INJECTION, SOLUTION INTRAVENOUS at 00:31

## 2018-09-13 RX ADMIN — ACETAMINOPHEN 650 MG: 650 SUPPOSITORY RECTAL at 07:00

## 2018-09-13 RX ADMIN — AMLODIPINE BESYLATE 10 MG: 10 TABLET ORAL at 09:50

## 2018-09-13 RX ADMIN — SODIUM CHLORIDE 750 MG: 900 INJECTION, SOLUTION INTRAVENOUS at 06:46

## 2018-09-13 RX ADMIN — POTASSIUM CHLORIDE 10 MEQ: 10 INJECTION, SOLUTION INTRAVENOUS at 03:32

## 2018-09-13 RX ADMIN — POTASSIUM CHLORIDE 40 MEQ: 1.5 POWDER, FOR SOLUTION ORAL at 10:14

## 2018-09-13 RX ADMIN — ACETAMINOPHEN 650 MG: 325 TABLET, FILM COATED ORAL at 10:14

## 2018-09-13 RX ADMIN — CEFTRIAXONE 1 G: 1 INJECTION, POWDER, FOR SOLUTION INTRAMUSCULAR; INTRAVENOUS at 12:42

## 2018-09-13 RX ADMIN — Medication 10 ML: at 14:00

## 2018-09-13 RX ADMIN — BUDESONIDE 500 MCG: 0.5 INHALANT RESPIRATORY (INHALATION) at 08:56

## 2018-09-13 RX ADMIN — LORAZEPAM 2 MG: 2 INJECTION, SOLUTION INTRAMUSCULAR; INTRAVENOUS at 11:18

## 2018-09-13 RX ADMIN — POTASSIUM CHLORIDE 10 MEQ: 10 INJECTION, SOLUTION INTRAVENOUS at 00:29

## 2018-09-13 RX ADMIN — SODIUM CHLORIDE 40 MG: 9 INJECTION, SOLUTION INTRAMUSCULAR; INTRAVENOUS; SUBCUTANEOUS at 20:51

## 2018-09-13 RX ADMIN — BUDESONIDE 500 MCG: 0.5 INHALANT RESPIRATORY (INHALATION) at 19:03

## 2018-09-13 RX ADMIN — SODIUM CHLORIDE 750 MG: 900 INJECTION, SOLUTION INTRAVENOUS at 14:55

## 2018-09-13 RX ADMIN — DEXTROSE MONOHYDRATE, SODIUM CHLORIDE, AND POTASSIUM CHLORIDE 100 ML/HR: 50; 4.5; 1.49 INJECTION, SOLUTION INTRAVENOUS at 12:42

## 2018-09-13 RX ADMIN — SODIUM CHLORIDE 100 MG: 900 INJECTION, SOLUTION INTRAVENOUS at 09:50

## 2018-09-13 RX ADMIN — POTASSIUM CHLORIDE 10 MEQ: 10 INJECTION, SOLUTION INTRAVENOUS at 02:29

## 2018-09-13 RX ADMIN — Medication 30 ML: at 14:00

## 2018-09-13 RX ADMIN — Medication 10 ML: at 17:00

## 2018-09-13 RX ADMIN — SODIUM CHLORIDE 100 MG: 900 INJECTION, SOLUTION INTRAVENOUS at 20:41

## 2018-09-13 RX ADMIN — HYDROCODONE BITARTRATE AND ACETAMINOPHEN 1 TABLET: 5; 325 TABLET ORAL at 15:56

## 2018-09-13 RX ADMIN — Medication 10 ML: at 06:00

## 2018-09-13 NOTE — PROGRESS NOTES
Progress Note Patient: Hector Lung MRN: [id number]  SSN: [id number] YOB: 1960  Age: 60 y.o. Sex: female Admit Date: 9/9/2018 LOS: 4 days Subjective:  
 
Patient could not hold still for MRI despite Ativan 2 mg IV. Patient now sedated from the Ativan. No seizure like activity reported by nursing or family. The patient complained of a 10/10 headache this morning. NG tube pulled out by patient. Headley removed as well. Current meds:  Vimpat 100 mg IV bid and Depacon 750 mg tid. Labs: NH3 11, LFTs OK. Depakote level 85 today. CSF results: cytology normal. HSV 1-2 PCR negative. Anti-Hu and anti-Ri antibodies negative. NMDA receptor antibody pending. Objective:  
 
Vitals:  
 09/13/18 0900 09/13/18 1000 09/13/18 1030 09/13/18 1155 BP: 149/78 (!) 82/52  135/81 Pulse: 82 86 82 71 Resp: 18 25 18 18 Temp:    98 °F (36.7 °C) SpO2: 96% 100% 96% 95% Weight:      
Height:      
  
 
Physical Exam:  
Sedated and snoring after Ativan. Pupils pinpoint, minimally reactive to light. Did not respond to stimuli. No resistance to passive eye opening. Gaze conjugate. Lab/Data Review: 
Recent Results (from the past 48 hour(s)) CULTURE, BLOOD Collection Time: 09/11/18  1:30 PM  
Result Value Ref Range Special Requests: PERIPHERAL Culture result: NO GROWTH 2 DAYS    
CULTURE, BLOOD Collection Time: 09/11/18  1:50 PM  
Result Value Ref Range Special Requests: PERIPHERAL Culture result: NO GROWTH 2 DAYS    
CBC W/O DIFF Collection Time: 09/12/18  4:00 AM  
Result Value Ref Range WBC 14.6 (H) 4.6 - 13.2 K/uL  
 RBC 4.02 (L) 4.20 - 5.30 M/uL  
 HGB 12.1 12.0 - 16.0 g/dL HCT 36.2 35.0 - 45.0 % MCV 90.0 74.0 - 97.0 FL  
 MCH 30.1 24.0 - 34.0 PG  
 MCHC 33.4 31.0 - 37.0 g/dL  
 RDW 13.5 11.6 - 14.5 % PLATELET 049 359 - 137 K/uL MPV 11.4 9.2 - 50.5 FL  
METABOLIC PANEL, BASIC  Collection Time: 09/12/18  4:00 AM  
 Result Value Ref Range Sodium 144 136 - 145 mmol/L Potassium 2.7 (LL) 3.5 - 5.5 mmol/L Chloride 104 100 - 108 mmol/L  
 CO2 32 21 - 32 mmol/L Anion gap 8 3.0 - 18 mmol/L Glucose 77 74 - 99 mg/dL BUN 8 7.0 - 18 MG/DL Creatinine 0.59 (L) 0.6 - 1.3 MG/DL  
 BUN/Creatinine ratio 14 12 - 20 GFR est AA >60 >60 ml/min/1.73m2 GFR est non-AA >60 >60 ml/min/1.73m2 Calcium 8.1 (L) 8.5 - 10.1 MG/DL  
PHOSPHORUS Collection Time: 09/12/18  4:00 AM  
Result Value Ref Range Phosphorus 1.9 (L) 2.5 - 4.9 MG/DL  
VALPROIC ACID Collection Time: 09/12/18  4:00 AM  
Result Value Ref Range Valproic acid 102 (H) 50 - 100 ug/ml CALCIUM, IONIZED Collection Time: 09/12/18  4:00 AM  
Result Value Ref Range Ionized Calcium 1.08 (L) 1.12 - 1.32 MMOL/L  
MAGNESIUM Collection Time: 09/12/18  4:00 AM  
Result Value Ref Range Magnesium 1.8 1.6 - 2.6 mg/dL GLUCOSE, POC Collection Time: 09/12/18 12:51 PM  
Result Value Ref Range Glucose (POC) 95 70 - 110 mg/dL HEPATIC FUNCTION PANEL Collection Time: 09/12/18  3:15 PM  
Result Value Ref Range Protein, total 5.8 (L) 6.4 - 8.2 g/dL Albumin 3.0 (L) 3.4 - 5.0 g/dL Globulin 2.8 2.0 - 4.0 g/dL A-G Ratio 1.1 0.8 - 1.7 Bilirubin, total 0.4 0.2 - 1.0 MG/DL Bilirubin, direct 0.1 0.0 - 0.2 MG/DL Alk. phosphatase 85 45 - 117 U/L  
 AST (SGOT) 39 (H) 15 - 37 U/L  
 ALT (SGPT) 22 13 - 56 U/L  
AMMONIA Collection Time: 09/12/18  4:15 PM  
Result Value Ref Range Ammonia 11 11 - 32 UMOL/L  
CALCIUM, IONIZED Collection Time: 09/12/18  8:26 PM  
Result Value Ref Range Ionized Calcium 1.08 (L) 1.12 - 1.32 MMOL/L  
POTASSIUM Collection Time: 09/12/18  8:26 PM  
Result Value Ref Range Potassium 3.1 (L) 3.5 - 5.5 mmol/L MAGNESIUM Collection Time: 09/12/18  8:26 PM  
Result Value Ref Range Magnesium 2.0 1.6 - 2.6 mg/dL PHOSPHORUS Collection Time: 09/12/18  8:26 PM  
Result Value Ref Range Phosphorus 2.0 (L) 2.5 - 4.9 MG/DL  
VALPROIC ACID Collection Time: 09/13/18  5:09 AM  
Result Value Ref Range Valproic acid 85 50 - 100 ug/ml METABOLIC PANEL, BASIC Collection Time: 09/13/18  5:09 AM  
Result Value Ref Range Sodium 141 136 - 145 mmol/L Potassium 3.6 3.5 - 5.5 mmol/L Chloride 104 100 - 108 mmol/L  
 CO2 31 21 - 32 mmol/L Anion gap 6 3.0 - 18 mmol/L Glucose 86 74 - 99 mg/dL BUN 7 7.0 - 18 MG/DL Creatinine 0.55 (L) 0.6 - 1.3 MG/DL  
 BUN/Creatinine ratio 13 12 - 20 GFR est AA >60 >60 ml/min/1.73m2 GFR est non-AA >60 >60 ml/min/1.73m2 Calcium 8.6 8.5 - 10.1 MG/DL  
CBC W/O DIFF Collection Time: 09/13/18  8:20 AM  
Result Value Ref Range WBC 14.0 (H) 4.6 - 13.2 K/uL  
 RBC 3.80 (L) 4.20 - 5.30 M/uL  
 HGB 11.3 (L) 12.0 - 16.0 g/dL HCT 34.3 (L) 35.0 - 45.0 % MCV 90.3 74.0 - 97.0 FL  
 MCH 29.7 24.0 - 34.0 PG  
 MCHC 32.9 31.0 - 37.0 g/dL  
 RDW 13.6 11.6 - 14.5 % PLATELET 049 520 - 816 K/uL MPV 10.3 9.2 - 11.8 FL  
GLUCOSE, POC Collection Time: 09/13/18  8:36 AM  
Result Value Ref Range Glucose (POC) 111 (H) 70 - 110 mg/dL Assessment:  
 
Principal Problem: 
  Status epilepticus ([facility] (9/9/2018) Active Problems: 
  HTN (hypertension) (9/27/2013) COPD (chronic obstructive pulmonary disease) ([facility] (9/27/2013) Tachycardia (9/9/2018) Anxiety (9/9/2018) GERD (gastroesophageal reflux disease) (9/9/2018) Overview: H/O--NOT ON MEDICATION AT PRESENT Acute kidney failure ([facility] (9/9/2018) Erythrocytosis (9/9/2018) Lymphocytosis (9/9/2018) Positive urine drug screen (9/9/2018) Overview: THC Postictal state ([facility] (9/9/2018) Plan: 1. SAH, unclear etiology. Given complaint of headache this AM, will check a CT of the head without contrast to make sure that no more bleeding has occurred.   Patient was unable to hold still for MRI, even with Ativan 2 mg IV. Will order low dose Norco prn headaches. 2.  Seizures. The patient was alert enough to take po meds this AM.  Told nurse OK to change Vimpat and Depacon to PO forms, same dosages, when patient is alert enough. Checking Depakote level in AM.  LFTs and NH3 levels are fine on Depakote. 3.  PRES. SBP down to 82 mmHg this AM, but usually in good range. Discussed with patient's daughters. Signed By: Dexter Wiley MD   
 September 13, 2018

## 2018-09-13 NOTE — PROGRESS NOTES
Problem: Falls - Risk of 
Goal: *Absence of Falls Document Avi Urias Fall Risk and appropriate interventions in the flowsheet. Outcome: Progressing Towards Goal 
Fall Risk Interventions: 
  
 
Mentation Interventions: Bed/chair exit alarm, Family/sitter at bedside Medication Interventions: Evaluate medications/consider consulting pharmacy Elimination Interventions: Patient to call for help with toileting needs Problem: Pressure Injury - Risk of 
Goal: *Prevention of pressure injury Document Glenn Scale and appropriate interventions in the flowsheet. Outcome: Progressing Towards Goal 
Pressure Injury Interventions: 
Sensory Interventions: Assess changes in LOC Moisture Interventions: Absorbent underpads Activity Interventions: Pressure redistribution bed/mattress(bed type) Mobility Interventions: Pressure redistribution bed/mattress (bed type) Nutrition Interventions: Document food/fluid/supplement intake Friction and Shear Interventions: Apply protective barrier, creams and emollients

## 2018-09-13 NOTE — PROGRESS NOTES
1406: PT orders received and chart reviewed. Per CAT Manrique patient recently received Ativan and sleeping soundly. Will f/u with patient on 9/14. Avi Bull PT, DPT Office extension: [phone] Pager #: [phone]

## 2018-09-13 NOTE — PROGRESS NOTES
[facility] Progress Note I have reviewed the flowsheet and previous days notes. Events, vitals, medications and notes from last 24 hours reviewed. Care plan discussed with staff and on multidisciplinary rounds. Subjective: 
9/13/2018 Pt is awake and alert today. Talking normally. C/o headache Impression and Plan Status epilepticus/Seizures - no recurrent seizures overnight. Pt is on Vimpat, depacon. Pt is scheduled for MRI brain today. Will defer mgt to Neurology. Small SAH - CTA Head shows No intracranial aneurysm or vascular malformation is identified. F/u with neurology HTN /PRES - Goal SBP is <140mmHg per Neurology. Pt is on HCTZ,Norvasc, prn labetalol and Hydralazine prn. Increase dose of HCTZ as pt still has episodes of elevated BP 
COPD - Pt is on Brovana and Pulmicort nebs Leucocytosis - Lower today than yesterday, afebrile today. Cont with Rocephin. Blood cx shows no growth so far. Hx of Anxiety DVT and GI proph - SCD and Protonix Low K and Phos - Replace Upper GI bleed - Continue protonix bid, appreciate GI consult OTHER: 
Glycemic Control. Glucose stabilizer per ICU protocol when on insulin drip. Maintain blood glucose 140-180. Replace electrolytes per ICU electrolyte replacement protocol HOB >=30 degree elevation all the time. Aggressive pulmonary toileting. Incentive spirometry when appropriate. Aspiration precautions. Ken bundle followed, remove ken catheter when not critically ill. PT/OT eval and treat. OOB/IS when appropriate. Quality Care: Stress ulcer prophylaxis, DVT prophylaxis, HOB elevated, Infection control all reviewed and addressed. Events and notes from last 24 hours reviewed. Care plan discussed with nursing. D/w patient's family above medical problems and answered all questions to their satisfaction. CC TIME: >45 min Medication Reviewed: Allergies Allergen Reactions  Lisinopril Cough Past Medical History:  
Diagnosis Date  Abdominal pain  Anxiety  COPD (chronic obstructive pulmonary disease) (HCC)  DJD (degenerative joint disease) of cervical spine  Elevated cholesterol  GERD (gastroesophageal reflux disease) H/O--NOT ON MEDICATION AT PRESENT  
 Hypertension  Nausea and vomiting Past Surgical History:  
Procedure Laterality Date  EXCISION TUMOR SOFT TISSUE FOOT/TOE SUBQ <1.5CM  2011  
 2 mccartney neuroma right foot  HX CARPAL TUNNEL RELEASE  2002  HX CHOLECYSTECTOMY  10/14/2013  HX HYSTERECTOMY  1994  
 vaginal (ovaries still in) [facility]  LA REMOVAL 1ST/CERVICAL RIB  1984  
 right side 1st rib  REMOVAL OF RIB(S)  1976  
 left side 1st rib Social History Substance Use Topics  Smoking status: Former Smoker  Smokeless tobacco: Never Used Comment: stop 4/2013; uses electronic cigarette  Alcohol use No  
  
Family History Problem Relation Age of Onset  Heart Disease Mother  Hypertension Mother  Diabetes Mother  Hypertension Maternal Aunt  Diabetes Maternal Aunt  Thyroid Disease Sister Prior to Admission medications Medication Sig Start Date End Date Taking? Authorizing Provider  
escitalopram oxalate (LEXAPRO) 20 mg tablet Take 20 mg by mouth daily. Andrew Treadwell MD  
pregabalin (LYRICA) 50 mg capsule Take 50 mg by mouth three (3) times daily. Andrew Treadwell MD  
HYDROcodone-acetaminophen (NORCO) 5-325 mg per tablet Take 1 Tab by mouth every eight (8) hours as needed for Pain. 10/10/13   Davey Nicolas PA-C  
ondansetron (ZOFRAN ODT) 8 mg disintegrating tablet Take 1 Tab by mouth every eight (8) hours as needed for Nausea. 10/10/13   Davey Nicolas PA-C  
fluticasone-salmeterol (ADVAIR DISKUS) 250-50 mcg/dose diskus inhaler Take 1 Puff by inhalation every twelve (12) hours. Historical Provider  
omega-3 fatty acids-vitamin e (FISH OIL) 1,000 mg cap Take 1 Cap by mouth. Historical Provider  
cyclobenzaprine (FLEXERIL) 10 mg tablet Take  by mouth three (3) times daily as needed. Historical Provider  
losartan-hydrochlorothiazide (HYZAAR) 100-25 mg per tablet Take 1 Tab by mouth daily. Historical Provider ALPRAZolam (XANAX) 0.5 mg tablet Take  by mouth. Historical Provider Current Facility-Administered Medications Medication Dose Route Frequency  dextrose 5% - 0.45% NaCl with KCl 20 mEq/L infusion  100 mL/hr IntraVENous CONTINUOUS  
 pantoprazole (PROTONIX) 40 mg in sodium chloride 0.9% 10 mL injection  40 mg IntraVENous Q12H  
 amLODIPine (NORVASC) tablet 10 mg  10 mg Oral DAILY  valproate (DEPACON) 750 mg in 0.9% sodium chloride 50 mL IVPB  750 mg IntraVENous Q8H  
 cefTRIAXone (ROCEPHIN) 1 g in 0.9% sodium chloride (MBP/ADV) 50 mL MBP  1 g IntraVENous Q24H  
 sodium chloride (NS) flush 10 mL  10 mL InterCATHeter Q24H  
 sodium chloride (NS) flush 10-40 mL  10-40 mL InterCATHeter Q8H  
 lacosamide (VIMPAT) 100 mg in 0.9% sodium chloride IVPB  100 mg IntraVENous BID  hydroCHLOROthiazide (HYDRODIURIL) tablet 25 mg  25 mg Oral DAILY  sodium chloride (NS) flush 5-10 mL  5-10 mL IntraVENous Q8H  
 sodium chloride (NS) flush 5-10 mL  5-10 mL IntraVENous Q8H  
 arformoterol (BROVANA) neb solution 15 mcg  15 mcg Nebulization BID RT  
 budesonide (PULMICORT) 500 mcg/2 ml nebulizer suspension  500 mcg Nebulization BID RT Peripheral Intravenous Line: 
Peripheral IV 09/09/18 Right Antecubital (Active) Site Assessment Clean, dry, & intact 9/10/2018  7:00 AM  
Phlebitis Assessment 0 9/10/2018  7:00 AM  
Infiltration Assessment 0 9/10/2018  7:00 AM  
Dressing Status Clean, dry, & intact 9/10/2018  7:00 AM  
Dressing Type Transparent 9/10/2018  7:00 AM  
Hub Color/Line Status Green;Patent;Capped 9/10/2018  7:00 AM  
Action Taken Open ports on tubing capped 9/10/2018  7:00 AM  
Alcohol Cap Used Yes 9/10/2018  7:00 AM  
   
 Peripheral IV 18 Left Antecubital (Active) Site Assessment Clean, dry, & intact 9/10/2018  7:00 AM  
Phlebitis Assessment 0 9/10/2018  7:00 AM  
Infiltration Assessment 0 9/10/2018  7:00 AM  
Dressing Status Clean, dry, & intact 9/10/2018  7:00 AM  
Dressing Type Transparent 9/10/2018  7:00 AM  
Hub Color/Line Status Green;Patent; Infusing 9/10/2018  7:00 AM  
Action Taken Open ports on tubing capped 9/10/2018  7:00 AM  
Alcohol Cap Used Yes 9/10/2018  7:00 AM  
   
Peripheral IV 18 Right Hand (Active) Site Assessment Clean, dry, & intact 9/10/2018  7:00 AM  
Phlebitis Assessment 0 9/10/2018  7:00 AM  
Infiltration Assessment 0 9/10/2018  7:00 AM  
Dressing Status Clean, dry, & intact 9/10/2018  7:00 AM  
Dressing Type Transparent 9/10/2018  7:00 AM  
Hub Color/Line Status Pink;Patent;Capped 9/10/2018  7:00 AM  
Action Taken Open ports on tubing capped 9/10/2018  7:00 AM  
Alcohol Cap Used Yes 9/10/2018  7:00 AM  
 
Drain(s): 
Nasogastric Tube 18 (Active) Site Assessment Clean, dry, & intact 9/10/2018  7:30 AM  
Dressing Status Clean, dry, & intact 9/10/2018  7:30 AM  
G Port Status Intermittent Suction 9/10/2018  7:30 AM  
External Insertion Evens (cms) 60 cms 9/10/2018  7:30 AM  
Action Taken Placement verified (comment) 9/10/2018  7:30 AM  
Drainage Description Lara Prince 9/10/2018  7:30 AM  
Gastric Residual (mL) 0 ml 9/10/2018  7:30 AM  
Drainage Chamber Level (ml) 0 ml 9/10/2018  7:00 AM  
Output (ml) 0 ml 9/10/2018  7:30 AM  
 
Objective: 
Vital Signs:   
Visit Vitals  BP (!) 115/95  Pulse 74  Temp 97.7 °F (36.5 °C)  Resp 19  
 Ht 5' 5\" (1.651 m)  Wt 104 kg (229 lb 4.5 oz)  SpO2 96%  BMI 38.15 kg/m2 O2 Device: Room air (nasal cannula on, but flowmeter off) O2 Flow Rate (L/min): 2 l/min Temp (24hrs), Av.3 °F (36.8 °C), Min:97.7 °F (36.5 °C), Max:99 °F (37.2 °C) Intake/Output:  
Last shift:      701 -  1900 In: 110 [I.V.:110] Out: - Last 3 shifts: 09/11 1901 - 09/13 0700 In: 5022.1 [I.V.:4992.1] Out: 8295 [[facility]:[id number]] Intake/Output Summary (Last 24 hours) at 09/13/18 1014 Last data filed at 09/13/18 3824 Gross per 24 hour Intake             3120 ml Output             2165 ml Net              955 ml Last 3 Recorded Weights in this Encounter 09/09/18 0500 09/10/18 5785 09/13/18 0032 Weight: 111.1 kg (245 lb) 104.1 kg (229 lb 8 oz) 104 kg (229 lb 4.5 oz) Physical Exam:  
 
General/Neurology: Alert and awake Head:   Normocephalic, without obvious abnormality Eye:   no scleral icterus, no pallor Oral:   Mucus membranes moist 
Neck:   Supple Lung:   B/l air entry is fair Heart:   Regular rate & rhythm. S1 S2 present. Abdomen/: Soft, non tender, BS +nt Extremities:  No pedal edema Skin:   Dry, intact Data: 
   
Recent Results (from the past 24 hour(s)) GLUCOSE, POC Collection Time: 09/12/18 12:51 PM  
Result Value Ref Range Glucose (POC) 95 70 - 110 mg/dL HEPATIC FUNCTION PANEL Collection Time: 09/12/18  3:15 PM  
Result Value Ref Range Protein, total 5.8 (L) 6.4 - 8.2 g/dL Albumin 3.0 (L) 3.4 - 5.0 g/dL Globulin 2.8 2.0 - 4.0 g/dL A-G Ratio 1.1 0.8 - 1.7 Bilirubin, total 0.4 0.2 - 1.0 MG/DL Bilirubin, direct 0.1 0.0 - 0.2 MG/DL Alk. phosphatase 85 45 - 117 U/L  
 AST (SGOT) 39 (H) 15 - 37 U/L  
 ALT (SGPT) 22 13 - 56 U/L  
AMMONIA Collection Time: 09/12/18  4:15 PM  
Result Value Ref Range Ammonia 11 11 - 32 UMOL/L  
CALCIUM, IONIZED Collection Time: 09/12/18  8:26 PM  
Result Value Ref Range Ionized Calcium 1.08 (L) 1.12 - 1.32 MMOL/L  
POTASSIUM Collection Time: 09/12/18  8:26 PM  
Result Value Ref Range Potassium 3.1 (L) 3.5 - 5.5 mmol/L MAGNESIUM Collection Time: 09/12/18  8:26 PM  
Result Value Ref Range Magnesium 2.0 1.6 - 2.6 mg/dL PHOSPHORUS Collection Time: 09/12/18  8:26 PM  
Result Value Ref Range Phosphorus 2.0 (L) 2.5 - 4.9 MG/DL  
VALPROIC ACID Collection Time: 09/13/18  5:09 AM  
Result Value Ref Range Valproic acid 85 50 - 100 ug/ml METABOLIC PANEL, BASIC Collection Time: 09/13/18  5:09 AM  
Result Value Ref Range Sodium 141 136 - 145 mmol/L Potassium 3.6 3.5 - 5.5 mmol/L Chloride 104 100 - 108 mmol/L  
 CO2 31 21 - 32 mmol/L Anion gap 6 3.0 - 18 mmol/L Glucose 86 74 - 99 mg/dL BUN 7 7.0 - 18 MG/DL Creatinine 0.55 (L) 0.6 - 1.3 MG/DL  
 BUN/Creatinine ratio 13 12 - 20 GFR est AA >60 >60 ml/min/1.73m2 GFR est non-AA >60 >60 ml/min/1.73m2 Calcium 8.6 8.5 - 10.1 MG/DL  
CBC W/O DIFF Collection Time: 09/13/18  8:20 AM  
Result Value Ref Range WBC 14.0 (H) 4.6 - 13.2 K/uL  
 RBC 3.80 (L) 4.20 - 5.30 M/uL  
 HGB 11.3 (L) 12.0 - 16.0 g/dL HCT 34.3 (L) 35.0 - 45.0 % MCV 90.3 74.0 - 97.0 FL  
 MCH 29.7 24.0 - 34.0 PG  
 MCHC 32.9 31.0 - 37.0 g/dL  
 RDW 13.6 11.6 - 14.5 % PLATELET 256 946 - 901 K/uL MPV 10.3 9.2 - 11.8 FL  
GLUCOSE, POC Collection Time: 09/13/18  8:36 AM  
Result Value Ref Range Glucose (POC) 111 (H) 70 - 110 mg/dL Chemistry Recent Labs  
   09/13/18 
 0509  09/12/18 2026  09/12/18 0499 52 06 34  09/12/18 
 0400  09/11/18 
 0345 GLU  86   --    --   77  91 NA  141   --    --   144  143  
K  3.6  3.1*   --   2.7*  3.0*  
CL  104   --    --   104  105 CO2  31   --    --   32  30 BUN  7   --    --   8  10 CREA  0.55*   --    --   0.59*  0.62  
CA  8.6   --    --   8.1*  8.1*  
MG   --   2.0   --   1.8  1.8 PHOS   --   2.0*   --   1.9*  1.5* AGAP  6   --    --   8  8 BUCR  13   --    --   14  16 AP   --    --   85   --    --   
TP   --    --   5.8*   --    --   
ALB   --    --   3.0*   --    --   
GLOB   --    --   2.8   --    --   
AGRAT   --    --   1.1   --    --   
 
 
CBC w/Diff Recent Labs  
   09/13/18 
 0820  09/12/18 
 0400  09/11/18 
 0345 WBC  14.0*  14.6*  16.8*  
RBC  3.80*  4.02*  4.11* HGB  11.3*  12.1  12.4 HCT  34.3*  36.2  36.7 PLT  199  219  244 Micro Recent Labs  
   09/11/18 
 1350  09/11/18 
 1330 CULT  NO GROWTH AFTER 17 HOURS  NO GROWTH AFTER 17 HOURS Recent Labs  
   09/11/18 
 1350  09/11/18 
 1330 CULT  NO GROWTH AFTER 17 HOURS  NO GROWTH AFTER 17 HOURS  
 
 
CT (Most Recent) Results from Hospital Encounter encounter on 09/09/18 CTA HEAD NECK W WO CONT Narrative EXAM: CTA HEAD AND NECK INDICATION: Subarachnoid hemorrhage, possible aneurysm COMPARISON: MR brain 9/9/2018 TECHNIQUE:  Multiple axial CT images of the neck were obtained extending from 
the level of the aortic arch to the skull base after the administration of the 
IV contrast utilizing a CTA protocol. Maximum intensity projection 
reconstructions were performed in multiple planes. Multiple axial CT images of the head were obtained extending from below the 
level of the skull base to the vertex after the administration of the IV 
contrast utilizing a CTA protocol. Maximum intensity projection reconstructions 
were performed in three planes. Additional 3 D reconstructions were performed 
at a separate workstation. One or more dose reduction techniques were used on this CT: automated exposure 
control, adjustment of the mAs and/or kVp according to patient's size, and 
iterative reconstruction techniques. The specific techniques utilized on this CT 
exam have been documented in the patient's electronic medical record. FINDINGS: 
 
Motion artifact is present which limits evaluation. CTA NECK Mild amount of atherosclerotic calcifications are present along the aortic arch. No high-grade proximal great vessel origin stenosis is seen. Mild narrowing of 
the proximal right subclavian artery origin is noted. The left common carotid artery is mildly irregular. The left carotid 
bifurcation is moderately irregular.  Estimated minimal luminal diameter proximal 
 left ICA 2.7 mm. Estimated luminal diameter of normal left ICA cervical segment 
is 5.1 mm. Estimated degree of stenosis of the left ICA based upon NASCET 
criteria is 48%. Remainder of left ICA cervical segment is minimally irregular. The right common carotid artery is slightly irregular. The right carotid 
bifurcation is mildly irregular. Estimated minimal luminal diameter proximal 
right ICA 3.7 mm. Estimated luminal diameter of normal right ICA cervical 
segment is 4.7 mm. Estimated degree of stenosis of the right ICA based upon NASCET criteria is 22%. Remainder of right ICA cervical segment is a normal 
variant vascular loop without focal stenosis. Left vertebral artery is mildly dominant. No focal vertebral artery stenosis is 
seen. Degenerative changes are seen in the spine. Nasogastric tube is present 
extending inferiorly out of the field-of-view in the esophagus. Air-fluid level is present in the right maxillary antrum. Additional right 
frontal sinus air-fluid level is present. Mild mucoperiosteal thickening seen in 
the ethmoidal air cells. Mastoid air cells are unremarkable. CTA HEAD There is no focal high-grade stenosis within the intracranial arteries. Mild 
atherosclerotic irregularity is seen in the bilateral carotid siphons without 
high grade stenosis. The carotid terminus is unremarkable. No high-grade ANDREA stenosis is seen. Very small anterior to indicating artery is 
suggested. Slight irregularity seen in the bilateral MCA vessels mostly along 
distal branches. Left vertebral artery is mildly dominant intracranially. PICA origins are 
unremarkable. Mild tortuosity is present involving the basilar artery without 
focal stenosis. Mild irregularity is seen in bilateral PCA branches distally. The dural venous sinuses are patent. Sharply defined rounded hypodensities are 
present distally in the transverse sinuses bilaterally compatible with pacchionian arachnoid granulation. No abnormal vessels are seen towards the 
vertex on the right in location of previously suggested small volume 
subarachnoid hemorrhage. 
 
 
_________________________ Impression IMPRESSION: 
 
1. No intracranial aneurysm or vascular malformation is identified to suggest 
etiology of previously seen small volume subarachnoid hemorrhage. 2.  Mild to moderate irregularity involving bilateral proximal ICA, estimated 48% stenosis on the left and 22% stenosis on the right, based on NASCET 
criteria. 3. No high-grade vertebral artery stenosis. 4. No definite high-grade intracranial stenosis. Mild multifocal irregularity 
and narrowing involving both anterior and posterior circulation, greatest 
involving the bilateral PCA. This finding is nonspecific but may represent 
intracranial atherosclerotic disease. Difficult to exclude other vasculitic 
processes. 5. No evidence of dural venous sinus thrombosis. XR (Most Recent). CXR reviewed by me and compared with previous CXR Results from Hospital Encounter encounter on 09/09/18 XR CHEST PORT Narrative CLINICAL: Seizure. COPD. Leukocytosis. COMPARISON: September 9, 2018. A portable view of the chest from 1213 hours: There is a right arm PICC line in place. NG tube is also present. . 
Linear density in the left midlung. This may represent scarring or atelectasis. Lungs appear otherwise clear. No pneumothorax Impression Impression: 
 
Atelectasis or scar in the left midlung. Lungs otherwise grossly clear High complexity decision making was performed during the evaluation of this patient at high risk for decompensation with multiple organ involvement  Above mentioned total time spent on reviewing the case/medical record/data/notes/EMR/patient examination/documentation/coordinating care with nurse/consultants, exclusive of procedures with complex decision making performed and > 50% time spent in face to face evaluation. Annmarie Santana MD 
9/13/2018

## 2018-09-13 NOTE — DIABETES MGMT
NUTRITION AND GLYCEMIC CONTROL FOLLOW UP/ PLAN OF CARE Devin Amezcua           60 y.o.           9/9/2018 1. Altered mental status, unspecified altered mental status type INTERVENTIONS/PLAN:  
Monitor diet advancement and tolerance,  labs and weights. ASSESSMENT:  
Pt is 183% ideal wt; pt appears well nourished. She is tolerating clear liquids. Nutrition Diagnoses:  
Inadequate oral food and beverage intake due to AMS/s/p seizures as evidenced by clear liquid diet. Obesity due to excess energy intake as evidenced by BMI of 38.2 kg/m2. SUBJECTIVE/OBJECTIVE: Information obtained from: chart review, ICU rounds, daughters Pt admitted with seizures, ARF and PMHx including COPD, HTN, GERD, elevated cholesterol. 9/13/18:  GI note reviewed. Dtrs at bedside stating pt took clear liquids items at breakfast and tolerated them well. Pt was sound asleep and diet/weight history obtained from dtrs. They report that pt's weight was stable PTA, she does not have food allergies and no history of chewing or swallowing issues. Diet: clear liquids Patient Vitals for the past 100 hrs: 
 % Diet Eaten 09/13/18 1014 50 % Medications: [x]                Reviewed IVF:  D5 NS at 100 ml/hr (408 calories/day) Most Recent POC Glucose:  
Recent Labs  
   09/13/18 
 0509  09/12/18 
 0400  09/11/18 
 0345 GLU  86  77  91 Labs:  
No results found for: HBA1C, HGBE8, [id number], [id number] Lab Results Component Value Date/Time  Sodium 141 09/13/2018 05:09 AM  
 Potassium 3.6 09/13/2018 05:09 AM  
 Chloride 104 09/13/2018 05:09 AM  
 CO2 31 09/13/2018 05:09 AM  
 Anion gap 6 09/13/2018 05:09 AM  
 Glucose 86 09/13/2018 05:09 AM  
 BUN 7 09/13/2018 05:09 AM  
 Creatinine 0.55 (L) 09/13/2018 05:09 AM  
 Calcium 8.6 09/13/2018 05:09 AM  
 Magnesium 2.0 09/12/2018 08:26 PM  
 Phosphorus 2.0 (L) 09/12/2018 08:26 PM  
 Albumin 3.0 (L) 09/12/2018 03:15 PM  
 
 
 Anthropometrics: IBW : 56.7 kg (125 lb), % IBW (Calculated): 183.6 %, BMI (calculated): 38.2 Wt Readings from Last 1 Encounters:  
09/13/18 104 kg (229 lb 4.5 oz) Ht Readings from Last 1 Encounters:  
09/10/18 5' 5\" (1.651 m) Estimated Nutrition Needs:  8163 Kcals/day, Protein (g): 85 g Fluid (ml): 1500 ml Based on:   [x]          Actual BW    []          ABW   []            Adjusted BW   
    
 
Nutrition Interventions: 
None at this time Goal:  
Provision of adequate nutrition by 9/15/18. Weight maintenance (+/- 1-2 kg) or slow, gradual weight loss of 1-2 lbs/week by 9/20/18. Nutrition Monitoring and Evaluation   
 
[x]     Monitor po intake on meal rounds 
[x]     Continue inpatient monitoring and intervention 
[]     Other: 
 
 
Nutrition Risk:  []   High     [x]  Moderate    []  Minimal/Uncompromised Sussy Barber RD, CDE Office:  [phone] [place] Pager:  [phone]

## 2018-09-13 NOTE — PROGRESS NOTES
Nursing assessment complete. Drowsy but oriented. Follows commands. NGT to DELTA Head[place] in place. Small abrasion to buttock 2127 family told by Dr. Rosa Maria Worley that NGT could come out today. Dr Rosa Maria Worley note stated to take out NGT. While speaking with family and patient, patient pulled out NGT. Will leave NGT out. Will continue to monitor 
0708 complaining of bad headache. Tylenol 650mg supp given. Bedside shift change report given to Tonya Landin (oncoming nurse) by Shiv Jacinto RN 
 (offgoing nurse). Report included the following information SBAR and MAR.

## 2018-09-13 NOTE — PROGRESS NOTES
Problem: Falls - Risk of 
Goal: *Absence of Falls Document Goran President Fall Risk and appropriate interventions in the flowsheet. Outcome: Progressing Towards Goal 
Fall Risk Interventions: 
  
 
Mentation Interventions: Bed/chair exit alarm, Family/sitter at bedside Medication Interventions: Evaluate medications/consider consulting pharmacy Elimination Interventions: Patient to call for help with toileting needs

## 2018-09-13 NOTE — PROGRESS NOTES
Gastrointestinal Progress Note Patient Name: Devin Amezcua Today's Date: 9/13/2018 Admit Date: 9/9/2018 Assessment-Recommendation:  
1 Acute anemia - No signs of acute GI bleeding. No endoscopic interventions planned. Advance diet as tolerated. Ok to switch to oral PPI. Subjective: Tolerating liquids. No signs of acute GI bleeding. Current Facility-Administered Medications Medication Dose Route Frequency  acetaminophen (TYLENOL) tablet 650 mg  650 mg Oral Q6H PRN  
 dextrose 5% - 0.45% NaCl with KCl 20 mEq/L infusion  100 mL/hr IntraVENous CONTINUOUS  
 pantoprazole (PROTONIX) 40 mg in sodium chloride 0.9% 10 mL injection  40 mg IntraVENous Q12H  
 amLODIPine (NORVASC) tablet 10 mg  10 mg Oral DAILY  valproate (DEPACON) 750 mg in 0.9% sodium chloride 50 mL IVPB  750 mg IntraVENous Q8H  
 LORazepam (ATIVAN) injection 2 mg  2 mg IntraVENous ONCE PRN  
 ELECTROLYTE REPLACEMENT PROTOCOL  1 Each Other PRN  
 albuterol-ipratropium (DUO-NEB) 2.5 MG-0.5 MG/3 ML  3 mL Nebulization Q6H PRN  
 cefTRIAXone (ROCEPHIN) 1 g in 0.9% sodium chloride (MBP/ADV) 50 mL MBP  1 g IntraVENous Q24H  
 sodium chloride (NS) flush 10-30 mL  10-30 mL InterCATHeter PRN  
 sodium chloride (NS) flush 10 mL  10 mL InterCATHeter Q24H  
 sodium chloride (NS) flush 10 mL  10 mL InterCATHeter PRN  
 sodium chloride (NS) flush 10-40 mL  10-40 mL InterCATHeter Q8H  
 bacitracin 500 unit/gram packet 1 Packet  1 Packet Topical PRN  
 LORazepam (ATIVAN) injection 1 mg  1 mg IntraVENous PRN  
 lacosamide (VIMPAT) 100 mg in 0.9% sodium chloride IVPB  100 mg IntraVENous BID  hydrALAZINE (APRESOLINE) 20 mg/mL injection 20 mg  20 mg IntraVENous Q4H PRN  
 hydroCHLOROthiazide (HYDRODIURIL) tablet 25 mg  25 mg Oral DAILY  sodium chloride (NS) flush 5-10 mL  5-10 mL IntraVENous Q8H  
  sodium chloride (NS) flush 5-10 mL  5-10 mL IntraVENous PRN  
 sodium chloride (NS) flush 5-10 mL  5-10 mL IntraVENous Q8H  
 sodium chloride (NS) flush 5-10 mL  5-10 mL IntraVENous PRN  
 acetaminophen (TYLENOL) suppository 650 mg  650 mg Rectal Q6H PRN  
 arformoterol (BROVANA) neb solution 15 mcg  15 mcg Nebulization BID RT  
 budesonide (PULMICORT) 500 mcg/2 ml nebulizer suspension  500 mcg Nebulization BID RT  
 labetalol (NORMODYNE;TRANDATE) 20 mg/4 mL (5 mg/mL) injection 20 mg  20 mg IntraVENous Q4H PRN Objective:  
 
Physical Exam: 
 
NAD 
OP clear Awake and alert Data Review: 
 
Labs: Results:  
   
Chemistry Recent Labs  
   09/13/18 
 0509  09/12/18 2026 09/12/18 0499 52 06 34  09/12/18 
 0400  09/11/18 
 0345 GLU  86   --    --   77  91 NA  141   --    --   144  143  
K  3.6  3.1*   --   2.7*  3.0*  
CL  104   --    --   104  105 CO2  31   --    --   32  30 BUN  7   --    --   8  10 CREA  0.55*   --    --   0.59*  0.62  
CA  8.6   --    --   8.1*  8.1* AGAP  6   --    --   8  8 BUCR  13   --    --   14  16 AP   --    --   85   --    --   
TP   --    --   5.8*   --    --   
ALB   --    --   3.0*   --    --   
GLOB   --    --   2.8   --    --   
AGRAT   --    --   1.1   --    --   
  
CBC w/Diff Recent Labs  
   09/13/18 
 0820  09/12/18 
 0400  09/11/18 
 0345 WBC  14.0*  14.6*  16.8*  
RBC  3.80*  4.02*  4.11* HGB  11.3*  12.1  12.4 HCT  34.3*  36.2  36.7 PLT  199  219  244 Coagulation No results for input(s): PTP, INR, APTT in the last 72 hours. No lab exists for component: INREXT Liver Enzymes Recent Labs  
   09/12/18 1515  
TP  5.8* ALB  3.0* AP  85 SGOT  39* ALT  [address] MD 
September 13, 2018

## 2018-09-13 NOTE — PROGRESS NOTES
7 [facility] Hospitalist Division Inpatient Daily Progress Note Patient: Ezequiel Andino MRN: [id number]  CSN: [id number] YOB: 1960  Age: 60 y.o. Sex: female DOA: 9/9/2018 LOS:  LOS: 4 days Chief Complaint:  H/A, muscle spasms Interval History: PMHx of COPD, anxiety, HTN, GERD; presented to ED with c/o severe H/A and muscle spasms. Pt was stable in ED with teleneuro consult with plans for observation and MRI. Pt started to have actively seizure w/ tonic-clonic activity and eye deviation to the left. 8mg of ativan given, but refractory. Keppra load refractory. Depakote load of 1500mg; 1500 mg PB. Neurology consulted. LP completed-CSF negative tubes 1&4. CT head negative. History of isolated WBCs. Recently c/o left sided numbness per pt's daughter-had been prescribed lyrica and flexeril. Takes xanax nightly. No h/o seizure. SBP elevated in ED. MRI c/w small area of subarachnoid hemorrhage on cortical surface right frontal lobe plus patchy brain edema, possible 2/2 malignant hypertension. UDS + benzo, thc. Fungal prep negative. Cryptococcal antigen negative. Tube 1: 0 WBCs and 21 RBCs. Tube 4: 0 WBCs and 111 RBCs. Protein 40, glucose 71.  
9/10/18: EEG completed-results pending. Family updated. SBP < 140-prn increased; added cozaar. Await neurology recommendations. Need to address feeding-TF? -will defer ICU. Start phenobarbital wean and check levels. Acyclovir stopped 2/2 CSF WBC count 0, CSF glucose and protein levels normal.  
9/11/18: EEG completed-per neurology's reading-Abnormal recording revealing the presence of moderate slowing of the background activity consistent with encephalopathy. This is a nonspecific finding and may be due to toxic, metabolic or inflammatory causes. No epileptiform activity was noted. (per neuro reading).  Following commands today; able to verbalize, open eyes to name. Restless-sitter ordered. Pending CSF studies paraneoplastic profile, NMDA receptor antibodies, HSV1-2 PCR. WBCs w/ slight improvement. Replace K-lytes protocol added. Phenobarbital level check in. SBP < 140 (currently ok). Urinary retention-straight cath X2, bladder scan prn, place ken if needed (but would overall try to avoid w/ elevated white count and unclear etiology?). Will need PT/OT/Speech when improved for discharge planning. Assess feeding-has NG tube. Echo ordered, but not yet completed. Continue to follow neuro status-slowly improving. 9/12/18: Patient following commands, but still restless and states she is \"falling out of bed\". Speech still garbled and hard to understand. Wbc's slight improvement; afebrile. Lytes off; replace per protocol. K continues low, will add K to fluid as well. Keep SBP < 140. Blood cultures NGTD. HSV 1/2 negative. Other serologies pending. Will await neurology recommendations. PB d/c'd yesterday. Ativan d/c'd but then given for seizure like activity per RN staff during night. 9/13/18: Transfer out of ICU. Remove ken; voiding trial. NG tube out. Swallow eval-clear liquid diet-advanced as tolerated. MRI today pending. Sitter d/c'd. K improved. SBP ok. On RA. WBCs continue to improve. Afebrile. GI consulted for NG tube w/ red aspirate. Subjective:  
  
Alert, oriented Following commands Objective:  
  
Visit Vitals  BP (!) 115/95  Pulse 74  Temp 97.7 °F (36.5 °C)  Resp 19  
 Ht 5' 5\" (1.651 m)  Wt 104 kg (229 lb 4.5 oz)  SpO2 93%  BMI 38.15 kg/m2 Physical Exam: 
General appearance: alert, cooperative, no distress Lungs: clear to auscultation bilaterally Heart: regular rate and rhythm, S1, S2 normal 
Abdomen: soft, non tender, non distended. Normoactive bowel sounds. Extremities: extremities normal, atraumatic, no cyanosis or edema Skin: Skin color, texture, turgor normal.  
 Neurologic: PERRL 2mm b/l, follows commands x4, AOX4 Intake and Output: 
Current Shift:    
Last three shifts:  09/11 1901 - 09/13 0700 In: 5022.1 [I.V.:4992.1] Out: 6944 [GXTWU:1060] Recent Results (from the past 24 hour(s)) GLUCOSE, POC Collection Time: 09/12/18 12:51 PM  
Result Value Ref Range Glucose (POC) 95 70 - 110 mg/dL HEPATIC FUNCTION PANEL Collection Time: 09/12/18  3:15 PM  
Result Value Ref Range Protein, total 5.8 (L) 6.4 - 8.2 g/dL Albumin 3.0 (L) 3.4 - 5.0 g/dL Globulin 2.8 2.0 - 4.0 g/dL A-G Ratio 1.1 0.8 - 1.7 Bilirubin, total 0.4 0.2 - 1.0 MG/DL Bilirubin, direct 0.1 0.0 - 0.2 MG/DL Alk. phosphatase 85 45 - 117 U/L  
 AST (SGOT) 39 (H) 15 - 37 U/L  
 ALT (SGPT) 22 13 - 56 U/L  
AMMONIA Collection Time: 09/12/18  4:15 PM  
Result Value Ref Range Ammonia 11 11 - 32 UMOL/L  
CALCIUM, IONIZED Collection Time: 09/12/18  8:26 PM  
Result Value Ref Range Ionized Calcium 1.08 (L) 1.12 - 1.32 MMOL/L  
POTASSIUM Collection Time: 09/12/18  8:26 PM  
Result Value Ref Range Potassium 3.1 (L) 3.5 - 5.5 mmol/L MAGNESIUM Collection Time: 09/12/18  8:26 PM  
Result Value Ref Range Magnesium 2.0 1.6 - 2.6 mg/dL PHOSPHORUS Collection Time: 09/12/18  8:26 PM  
Result Value Ref Range Phosphorus 2.0 (L) 2.5 - 4.9 MG/DL  
METABOLIC PANEL, BASIC Collection Time: 09/13/18  5:09 AM  
Result Value Ref Range Sodium 141 136 - 145 mmol/L Potassium 3.6 3.5 - 5.5 mmol/L Chloride 104 100 - 108 mmol/L  
 CO2 31 21 - 32 mmol/L Anion gap 6 3.0 - 18 mmol/L Glucose 86 74 - 99 mg/dL BUN 7 7.0 - 18 MG/DL Creatinine 0.55 (L) 0.6 - 1.3 MG/DL  
 BUN/Creatinine ratio 13 12 - 20 GFR est AA >60 >60 ml/min/1.73m2 GFR est non-AA >60 >60 ml/min/1.73m2 Calcium 8.6 8.5 - 10.1 MG/DL Lab Results Component Value Date/Time  Glucose 86 09/13/2018 05:09 AM  
 Glucose 77 09/12/2018 04:00 AM  
 Glucose 91 09/11/2018 03:45 AM  
 Glucose 162 (H) 09/09/2018 04:45 AM  
 Glucose 147 (H) 09/05/2018 12:55 PM  
 EXAM: CTA HEAD AND NECK 
  INDICATION: Subarachnoid hemorrhage, possible aneurysm 
  
COMPARISON: MR brain 9/9/2018 
  
TECHNIQUE:  Multiple axial CT images of the neck were obtained extending from 
the level of the aortic arch to the skull base after the administration of the 
IV contrast utilizing a CTA protocol. Maximum intensity projection 
reconstructions were performed in multiple planes. Multiple axial CT images of the head were obtained extending from below the 
level of the skull base to the vertex after the administration of the IV 
contrast utilizing a CTA protocol. Maximum intensity projection reconstructions 
were performed in three planes. Additional 3 D reconstructions were performed 
at a separate workstation. One or more dose reduction techniques were used on this CT: automated exposure 
control, adjustment of the mAs and/or kVp according to patient's size, and 
iterative reconstruction techniques. The specific techniques utilized on this CT 
exam have been documented in the patient's electronic medical record. 
  
FINDINGS: 
  
Motion artifact is present which limits evaluation. 
  
CTA NECK 
  
Mild amount of atherosclerotic calcifications are present along the aortic arch. No high-grade proximal great vessel origin stenosis is seen. Mild narrowing of 
the proximal right subclavian artery origin is noted. 
  
The left common carotid artery is mildly irregular. The left carotid 
bifurcation is moderately irregular. Estimated minimal luminal diameter proximal 
left ICA 2.7 mm. Estimated luminal diameter of normal left ICA cervical segment 
is 5.1 mm. Estimated degree of stenosis of the left ICA based upon NASCET 
criteria is 48%. Remainder of left ICA cervical segment is minimally irregular. 
  
The right common carotid artery is slightly irregular. The right carotid bifurcation is mildly irregular. Estimated minimal luminal diameter proximal 
right ICA 3.7 mm. Estimated luminal diameter of normal right ICA cervical 
segment is 4.7 mm. Estimated degree of stenosis of the right ICA based upon NASCET criteria is 22%. Remainder of right ICA cervical segment is a normal 
variant vascular loop without focal stenosis. 
  
Left vertebral artery is mildly dominant. No focal vertebral artery stenosis is 
seen. 
  
Degenerative changes are seen in the spine. Nasogastric tube is present 
extending inferiorly out of the field-of-view in the esophagus. 
  
Air-fluid level is present in the right maxillary antrum. Additional right 
frontal sinus air-fluid level is present. Mild mucoperiosteal thickening seen in 
the ethmoidal air cells. Mastoid air cells are unremarkable. 
  
CTA HEAD 
  
There is no focal high-grade stenosis within the intracranial arteries. Mild 
atherosclerotic irregularity is seen in the bilateral carotid siphons without 
high grade stenosis. The carotid terminus is unremarkable.  
  
No high-grade ANDREA stenosis is seen. Very small anterior to indicating artery is 
suggested. Slight irregularity seen in the bilateral MCA vessels mostly along 
distal branches. 
  
Left vertebral artery is mildly dominant intracranially. PICA origins are 
unremarkable. Mild tortuosity is present involving the basilar artery without 
focal stenosis. Mild irregularity is seen in bilateral PCA branches distally. The dural venous sinuses are patent. Sharply defined rounded hypodensities are 
present distally in the transverse sinuses bilaterally compatible with 
pacchionian arachnoid granulation. No abnormal vessels are seen towards the 
vertex on the right in location of previously suggested small volume 
subarachnoid hemorrhage. 
  
  
_________________________ 
  
IMPRESSION IMPRESSION: 
  
1.   No intracranial aneurysm or vascular malformation is identified to suggest 
 etiology of previously seen small volume subarachnoid hemorrhage. 
  
2.  Mild to moderate irregularity involving bilateral proximal ICA, estimated 48% stenosis on the left and 22% stenosis on the right, based on NASCET 
criteria.  
  
3. No high-grade vertebral artery stenosis. 
  
4. No definite high-grade intracranial stenosis. Mild multifocal irregularity 
and narrowing involving both anterior and posterior circulation, greatest 
involving the bilateral PCA. This finding is nonspecific but may represent 
intracranial atherosclerotic disease. Difficult to exclude other vasculitic 
processes. 
  
5. No evidence of dural venous sinus thrombosis. Assessment/Plan:  
 
Patient Active Problem List  
Diagnosis Code  DJD (degenerative joint disease) M19.90  
 HTN (hypertension) I10  
 COPD (chronic obstructive pulmonary disease) ([facility] J44.9  Status post laparoscopic cholecystectomy Z90.49  Tachycardia R00.0  Status epilepticus ([facility] G40.901  Anxiety F41.9  GERD (gastroesophageal reflux disease) K21.9  Acute kidney failure (HCC) N17.9  Erythrocytosis D75.1  Lymphocytosis D72.820  
 Positive urine drug screen R82.5  Postictal state ([facility] R56.9 A/P: 
 
Seizure Activity/acute encephalopathy  
-2/2 PRES and small SAH (MRI pending) -neuro following-appreciate  
-PB, depakote-PB d/c'd; vimpat started  
-seizure precautions- 
-CT head negative -EEG completed 9/10-IMPRESSION:  Abnormal recording revealing the presence of moderate slowing of the background activity consistent with encephalopathy. This is a nonspecific finding and may be due to toxic, metabolic or inflammatory causes. No epileptiform activity was noted. (per neuro reading) -MRI c/w small area of subarachnoid hemorrhage on cortical surface right frontal lobe plus patchy brain edema, possible 2/2 malignant hypertension.   
-LP completed-CSF tubes 1 & 4 negative ; acyclovir d/c'd  
 -will await further recommendations from neurology  
-will d/c sitter as significant improvement in MS Subarachnoid bleed  
-neurology following -appreciate  
-MRI showed small area of subarachnoid hemorrhage on cortical surface right frontal lobe plus patchy brain edema, possible 2/2 malignant hypertension. -SBP < 140  (currently BP ok) -CTA negative for aneurysm  
-MRI pending today PRES  
-keep SBP < 140  
-norvasc 5mg started  
-increased prn, added scheduled hydrochlorathiazide  
-hold cozaar until kidney numbers improve COPD 
-sat goal > 90% (titrate down O2 as tolerated) -on RA  
-PCCM following 
-pulmicort/brovana UTI 
-rocephin  
 
Urinary retention 
-straight cath X 2 
-bladder scan prn 
-remove ken for repeat voiding trial; bladder scan if no void after 6 hours post removal  
 
?GI bleed 
-protonix  
-GI consulted-appreciate 
-no active s/s GI bleed GERD  
-protonix DVT prophylaxis 
-SCDs GI-protonix David Martinez Transfer out of ICU 9/13/18 PT/OT Michael Stephens, SANGEETHA, NP-C [address] [place], [place] [address] Hospitalist Division [phone] Office:  [phone]

## 2018-09-13 NOTE — PROGRESS NOTES
Problem: Falls - Risk of 
Goal: *Absence of Falls Document Fernando Perez Fall Risk and appropriate interventions in the flowsheet. Outcome: Progressing Towards Goal 
Fall Risk Interventions: 
  
 
Mentation Interventions: Bed/chair exit alarm Medication Interventions: Bed/chair exit alarm Elimination Interventions: Call light in reach Problem: Pressure Injury - Risk of 
Goal: *Prevention of pressure injury Document Glenn Scale and appropriate interventions in the flowsheet. Outcome: Progressing Towards Goal 
Pressure Injury Interventions: 
Sensory Interventions: Monitor skin under medical devices, Turn and reposition approx. every two hours (pillows and wedges if needed) Moisture Interventions: Absorbent underpads, Maintain skin hydration (lotion/cream) Activity Interventions: Pressure redistribution bed/mattress(bed type) Mobility Interventions: Pressure redistribution bed/mattress (bed type), Turn and reposition approx. every two hours(pillow and wedges) Nutrition Interventions: Document food/fluid/supplement intake Friction and Shear Interventions: Foam dressings/transparent film/skin sealants, HOB 30 degrees or less

## 2018-09-13 NOTE — PROGRESS NOTES
09/13/18 I went up to patients room and spoke to pt and daughters Christina Quintana and Maico Cardoso. Patient is reportedly doing much better, answering questions and moving in bed. I asked the daughters if patient could go to their homes at [place] if she is not able to care totally for herself. They said yes definitely for temporary needs, if needed forever, they would discuss with in family members to try to make it work. She has not had Therapy yet. FYI placed to request  therapy orders . Home Health Therapy is an option at [place]. Snf list given to daughters as potential disposition also. Patient is acting  appropriately as far as I've seen now. She is to go to MRI today,  and possible transfer to floor. Case Management to follow.

## 2018-09-13 NOTE — PROGRESS NOTES
Problem: Pressure Injury - Risk of 
Goal: *Prevention of pressure injury Document Glenn Scale and appropriate interventions in the flowsheet. Outcome: Progressing Towards Goal 
Pressure Injury Interventions: 
Sensory Interventions: Assess changes in LOC Moisture Interventions: Absorbent underpads Activity Interventions: Pressure redistribution bed/mattress(bed type) Mobility Interventions: Pressure redistribution bed/mattress (bed type) Nutrition Interventions: Document food/fluid/supplement intake Friction and Shear Interventions: Apply protective barrier, creams and emollients

## 2018-09-13 NOTE — PROGRESS NOTES
1122-Will be receiving patient. Dr Uir Saxena at nurses station. Orders received to discontinue bedrest orders, add PT/OT, and modify BP parameters to SBP >140. RBV. 
1155- Patient received to unit from MRI, received 2 mg IV Ativan but was unable to complete exam d/t fidgeting. Patient drowsy, but arousible. VS taken, call bell in reach. Ken was removed at 1030, DTV 1630. Will continue to monitor. 1200- Dr Uri Saxena made aware of patient being unable to complete MRI, voiced understanding, no new orders received. 1245- Family at bedside. 80- Dr Desean Mcintosh (neuro) at nurses station. Orders received to switch anti-epileptics to PO once patient is alert enough. Told she will put in nursing miscellaneous orders. RBV. 1540- Telephone orders received from Dr Desean Mcintosh for  Norco 5-325 1 tab q4h prn headache. RBV. Also informed of patient CT Head results, asked to inform patient and family of results. 1618- PRN Hydralazine given for SBP over goal 
1630- Bladder scan >365 ml. Dr Uri Saxena paged to make aware. 1640- Dr Uri Saxena paged again regarding above. 65- Dr Uri Saxena returned page. Orders received to straight cath x2 6 hours apart, then ken placement if unable to void. RBV. 1710- Patient awake; alert and oriented x4. Patient refused straight cath at this time, wants to eat dinner and try to void again. 1750- Patient wants to try to void on bedpan before straight cath, patient put on bedpan. 1755- Called to pharmacy, spoke with Jennifer Molina, told that they are making Vimpat, will bring it up when it is ready. 1810- Patient straight cath, 1000 ml clear yellow urine. [phone]- Patient reports headache has resolved; pain 0/10.  
1930-Bedside and Verbal shift change report given to Sam Buenrostro RN  (oncoming nurse) by Vicky Bailey (offgoing nurse). Report included the following information SBAR, Kardex, Intake/Output, MAR and Recent Results.

## 2018-09-13 NOTE — ROUTINE PROCESS
0715 [facility] lethargic and is at baseline per off going nurse. Up in bed in no apparent distress. Able to make needs known. VSS via monitor. Sitter at bedside. Bed locked and in low position. Recently medicated for headache. States pain was at 10 on a 0 to 10 scale and now at 5. Per pt her pain goal is 0. Instruct to call for assistance. 1030 Pt off unit for MRI. Pt left unit in no acute distress. 1045 Headley catheter care performed. Barrier cream applied to bilateral inner thighs. Headley catheter discontinued as ordered. 1100 Transfer report given to [facility], RN on [address]. Report included the following information. SBAR, MAR, KARDEX AND RECENT RESULTS.

## 2018-09-14 ENCOUNTER — APPOINTMENT (OUTPATIENT)
Dept: MRI IMAGING | Age: 58
DRG: 064 | End: 2018-09-14
Attending: PSYCHIATRY & NEUROLOGY
Payer: COMMERCIAL

## 2018-09-14 PROBLEM — I60.9 SAH (SUBARACHNOID HEMORRHAGE) (HCC): Status: ACTIVE | Noted: 2018-09-14

## 2018-09-14 PROBLEM — R00.0 TACHYCARDIA: Status: RESOLVED | Noted: 2018-09-09 | Resolved: 2018-09-14

## 2018-09-14 PROBLEM — I67.83 PRES (POSTERIOR REVERSIBLE ENCEPHALOPATHY SYNDROME): Status: ACTIVE | Noted: 2018-09-14

## 2018-09-14 PROBLEM — R56.9 POSTICTAL STATE (HCC): Status: RESOLVED | Noted: 2018-09-09 | Resolved: 2018-09-14

## 2018-09-14 LAB
BACTERIA SPEC CULT: NORMAL
GRAM STN SPEC: NORMAL
GRAM STN SPEC: NORMAL
NMDA RECEPTOR, CSF: NORMAL
SERVICE CMNT-IMP: NORMAL
VALPROATE SERPL-MCNC: 83 UG/ML (ref 50–100)

## 2018-09-14 PROCEDURE — 74011000250 HC RX REV CODE- 250: Performed by: INTERNAL MEDICINE

## 2018-09-14 PROCEDURE — 74011000258 HC RX REV CODE- 258: Performed by: PSYCHIATRY & NEUROLOGY

## 2018-09-14 PROCEDURE — 74011250637 HC RX REV CODE- 250/637: Performed by: INTERNAL MEDICINE

## 2018-09-14 PROCEDURE — 70553 MRI BRAIN STEM W/O & W/DYE: CPT

## 2018-09-14 PROCEDURE — 77030011943

## 2018-09-14 PROCEDURE — 65660000000 HC RM CCU STEPDOWN

## 2018-09-14 PROCEDURE — 97530 THERAPEUTIC ACTIVITIES: CPT

## 2018-09-14 PROCEDURE — 74011250636 HC RX REV CODE- 250/636: Performed by: PSYCHIATRY & NEUROLOGY

## 2018-09-14 PROCEDURE — 74011250637 HC RX REV CODE- 250/637: Performed by: PSYCHIATRY & NEUROLOGY

## 2018-09-14 PROCEDURE — 74011250636 HC RX REV CODE- 250/636: Performed by: HOSPITALIST

## 2018-09-14 PROCEDURE — C9113 INJ PANTOPRAZOLE SODIUM, VIA: HCPCS | Performed by: INTERNAL MEDICINE

## 2018-09-14 PROCEDURE — 36415 COLL VENOUS BLD VENIPUNCTURE: CPT | Performed by: PSYCHIATRY & NEUROLOGY

## 2018-09-14 PROCEDURE — 74011000258 HC RX REV CODE- 258: Performed by: INTERNAL MEDICINE

## 2018-09-14 PROCEDURE — C9254 INJECTION, LACOSAMIDE: HCPCS | Performed by: PSYCHIATRY & NEUROLOGY

## 2018-09-14 PROCEDURE — 94640 AIRWAY INHALATION TREATMENT: CPT

## 2018-09-14 PROCEDURE — 80164 ASSAY DIPROPYLACETIC ACD TOT: CPT | Performed by: PSYCHIATRY & NEUROLOGY

## 2018-09-14 PROCEDURE — 74011250636 HC RX REV CODE- 250/636: Performed by: INTERNAL MEDICINE

## 2018-09-14 PROCEDURE — 74011250636 HC RX REV CODE- 250/636: Performed by: NURSE PRACTITIONER

## 2018-09-14 PROCEDURE — 74011000250 HC RX REV CODE- 250: Performed by: PSYCHIATRY & NEUROLOGY

## 2018-09-14 PROCEDURE — 97162 PT EVAL MOD COMPLEX 30 MIN: CPT

## 2018-09-14 PROCEDURE — A9575 INJ GADOTERATE MEGLUMI 0.1ML: HCPCS | Performed by: HOSPITALIST

## 2018-09-14 RX ORDER — LORAZEPAM 2 MG/ML
2 INJECTION INTRAMUSCULAR ONCE
Status: COMPLETED | OUTPATIENT
Start: 2018-09-14 | End: 2018-09-14

## 2018-09-14 RX ORDER — GADOTERATE MEGLUMINE 376.9 MG/ML
20 INJECTION INTRAVENOUS
Status: COMPLETED | OUTPATIENT
Start: 2018-09-14 | End: 2018-09-14

## 2018-09-14 RX ADMIN — ARFORMOTEROL TARTRATE 15 MCG: 15 SOLUTION RESPIRATORY (INHALATION) at 20:39

## 2018-09-14 RX ADMIN — SODIUM CHLORIDE 100 MG: 900 INJECTION, SOLUTION INTRAVENOUS at 08:55

## 2018-09-14 RX ADMIN — SODIUM CHLORIDE 750 MG: 900 INJECTION, SOLUTION INTRAVENOUS at 22:15

## 2018-09-14 RX ADMIN — SODIUM CHLORIDE 750 MG: 900 INJECTION, SOLUTION INTRAVENOUS at 05:54

## 2018-09-14 RX ADMIN — HYDROCODONE BITARTRATE AND ACETAMINOPHEN 1 TABLET: 5; 325 TABLET ORAL at 21:17

## 2018-09-14 RX ADMIN — BUDESONIDE 500 MCG: 0.5 INHALANT RESPIRATORY (INHALATION) at 08:42

## 2018-09-14 RX ADMIN — Medication 10 ML: at 13:53

## 2018-09-14 RX ADMIN — AMLODIPINE BESYLATE 10 MG: 10 TABLET ORAL at 08:54

## 2018-09-14 RX ADMIN — LORAZEPAM 2 MG: 2 INJECTION, SOLUTION INTRAMUSCULAR; INTRAVENOUS at 13:49

## 2018-09-14 RX ADMIN — HYDROCHLOROTHIAZIDE 50 MG: 25 TABLET ORAL at 08:56

## 2018-09-14 RX ADMIN — ARFORMOTEROL TARTRATE 15 MCG: 15 SOLUTION RESPIRATORY (INHALATION) at 08:42

## 2018-09-14 RX ADMIN — Medication 10 ML: at 22:49

## 2018-09-14 RX ADMIN — Medication 10 ML: at 06:02

## 2018-09-14 RX ADMIN — SODIUM CHLORIDE 100 MG: 900 INJECTION, SOLUTION INTRAVENOUS at 21:27

## 2018-09-14 RX ADMIN — SODIUM CHLORIDE 750 MG: 900 INJECTION, SOLUTION INTRAVENOUS at 13:53

## 2018-09-14 RX ADMIN — BUDESONIDE 500 MCG: 0.5 INHALANT RESPIRATORY (INHALATION) at 20:39

## 2018-09-14 RX ADMIN — HYDROCODONE BITARTRATE AND ACETAMINOPHEN 1 TABLET: 5; 325 TABLET ORAL at 05:11

## 2018-09-14 RX ADMIN — HYDRALAZINE HYDROCHLORIDE 20 MG: 20 INJECTION INTRAMUSCULAR; INTRAVENOUS at 05:14

## 2018-09-14 RX ADMIN — SODIUM CHLORIDE 40 MG: 9 INJECTION, SOLUTION INTRAMUSCULAR; INTRAVENOUS; SUBCUTANEOUS at 20:36

## 2018-09-14 RX ADMIN — SODIUM CHLORIDE 40 MG: 9 INJECTION, SOLUTION INTRAMUSCULAR; INTRAVENOUS; SUBCUTANEOUS at 08:56

## 2018-09-14 RX ADMIN — CEFTRIAXONE 1 G: 1 INJECTION, POWDER, FOR SOLUTION INTRAMUSCULAR; INTRAVENOUS at 13:49

## 2018-09-14 RX ADMIN — Medication 10 ML: at 19:01

## 2018-09-14 RX ADMIN — GADOTERATE MEGLUMINE 20 ML: 376.9 INJECTION INTRAVENOUS at 12:55

## 2018-09-14 RX ADMIN — Medication 10 ML: at 22:50

## 2018-09-14 RX ADMIN — DEXTROSE MONOHYDRATE, SODIUM CHLORIDE, AND POTASSIUM CHLORIDE 100 ML/HR: 50; 4.5; 1.49 INJECTION, SOLUTION INTRAVENOUS at 04:11

## 2018-09-14 RX ADMIN — LORAZEPAM 2 MG: 2 INJECTION, SOLUTION INTRAMUSCULAR; INTRAVENOUS at 11:26

## 2018-09-14 NOTE — PROGRESS NOTES
Problem: Falls - Risk of 
Goal: *Absence of Falls Document Liza Newberry Fall Risk and appropriate interventions in the flowsheet. Outcome: Progressing Towards Goal 
Fall Risk Interventions: 
  
 
Mentation Interventions: Bed/chair exit alarm, Door open when patient unattended, More frequent rounding Medication Interventions: Bed/chair exit alarm Elimination Interventions: Call light in reach Problem: Pressure Injury - Risk of 
Goal: *Prevention of pressure injury Document Glenn Scale and appropriate interventions in the flowsheet. Outcome: Progressing Towards Goal 
Pressure Injury Interventions: 
Sensory Interventions: Monitor skin under medical devices Moisture Interventions: Absorbent underpads Activity Interventions: Pressure redistribution bed/mattress(bed type) Mobility Interventions: Pressure redistribution bed/mattress (bed type) Nutrition Interventions: Document food/fluid/supplement intake Friction and Shear Interventions: Foam dressings/transparent film/skin sealants

## 2018-09-14 NOTE — ROUTINE PROCESS
O.T. Evaluation attempted, patient asleep with dtr at bedside w/ report that patient received Ativan in prep for MRI this date and will likely not awaken for OT evaluation. OT to f/u when appropriate to conduct evaluation.

## 2018-09-14 NOTE — PROGRESS NOTES
[facility] Pulmonary, Critical Care, and Sleep Medicine Critical Care Progress Note Name: [place] Monique : 1960 MRN: [id number] Date: 2018 My assessment, plan of care, findings, medications, side effects etc were discussed with: 
[x] Nurse [] PT/OT   
[] Respiratory therapy []    
[x] Family [] Patient: answered all questions to satisfaction  
[] Pharmacist [] ASSESSMENT/PLAN:  
Principal Problem: 
  Status epilepticus ([facility] (2018) Active Problems: 
  Postictal state ([facility] (2018) Tachycardia (2018) Positive urine drug screen (2018) Overview: THC Acute kidney failure ([facility] (2018) Erythrocytosis (2018) Lymphocytosis (2018) GERD (gastroesophageal reflux disease) (2018) Overview: H/O--NOT ON MEDICATION AT PRESENT 
 
  HTN (hypertension) (2013) COPD (chronic obstructive pulmonary disease) ([facility] (2013) Anxiety (2018) · Continue Brovana/Pulmicort for now. Resume Advair on discharge. · Antiepileptic regimen per Neurology · MRI report PENDING 
· Outpatient evaluation for possible sleep apnea is advised. · PT/OT evaluation and treatment plan NUTRITION: clear liquid. PROPHYLAXIS: 
DVT prophylaxis: SCDs GI prophylaxis: Protonix HOB 30-degree elevation CODE STATUS: FULL CODE Please be advised that I am a locum tenens physician for the [address] Outpatient pulmonary follow up appointments, if advised, should be arranged with Dr. Joe Scott unless the patient has already established with a local pulmonologist (in which case, the patient should be directed back to their pulmonologist for follow up). DISPOSITION: at the discretion of the attending physician. Will see as needed. Please call if needed. The patient is: [] acutely ill Risk of deterioration: [] moderate [] critically ill  [] high  
 
[x]See my orders for details 
 
[] The patient is unable to give any meaningful history or review of systems because the patient is: 
[] Intubated [] Sedated  
[x] Unresponsive [] []The patient is critically ill on     
[] Mechanical ventilation [] Vasoactive agents  
[] BiPAP [] Inotropes SUBJECTIVE 
- This 60 y.o.  female is seen in consultation at the request of Dr. Yung  recommendations on further evaluation and management of altered mental status. The patient presented in status epilepticus and appears to currently be in a postictal state. She is obtunded. She is starting to move her extremities to noxious stimuli. 
  
She initially presented with acute onset headache that she apparently described as a \"migraine\" but as being much more severe than normal. She recently presented to the ER with similar complaints that were reportedly triggered by straining to move her bowels. No nausea or vomiting. No visual changes. No chest pain. No dyspnea. She apparently does have some chronic muscular symptoms, including cramping, for which she was recently started on Lyrica. She also takes Xanax. She was found to have a small subarachnoid hemorrhage. She has been on blood pressure control for treatment of posterior reversible encephalopathy syndrome. Her antiepileptic regimen currently consists of Vimpat and Depacon. 
 
- Overnight events: got Ativan for MRI imaging today. Now snoring loudly. Sleeping. arousable to noxious stimuli. [x] Nutrition: clear liquids [] BM today. ROS: Review of systems not obtained due to patient factors. Past Medical History: 
Past Medical History:  
Diagnosis Date  Abdominal pain  Anxiety  COPD (chronic obstructive pulmonary disease) (HCC)  DJD (degenerative joint disease) of cervical spine  Elevated cholesterol  GERD (gastroesophageal reflux disease) H/O--NOT ON MEDICATION AT PRESENT  
 Hypertension  Nausea and vomiting Allergy: Allergies Allergen Reactions  Lisinopril Cough Current Facility-Administered Medications:  
  acetaminophen (TYLENOL) tablet 650 mg, 650 mg, Oral, Q6H PRN, Luis Angel Castillo MD, 650 mg at 09/13/18 1014   hydroCHLOROthiazide (HYDRODIURIL) tablet 50 mg, 50 mg, Oral, DAILY, Luis Angel Castillo MD, 50 mg at 09/14/18 0856 
  HYDROcodone-acetaminophen (NORCO) 5-325 mg per tablet 1 Tab, 1 Tab, Oral, Q4H PRN, Matthew Billingsley MD, 1 Tab at 09/14/18 0511 
  lacosamide (VIMPAT) 100 mg in 0.9% sodium chloride IVPB, 100 mg, IntraVENous, BID, Matthew Billingsley MD, 100 mg at 09/14/18 1178   dextrose 5% - 0.45% NaCl with KCl 20 mEq/L infusion, 100 mL/hr, IntraVENous, CONTINUOUS, Ar Sagastume NP, Last Rate: 100 mL/hr at 09/14/18 0411, 100 mL/hr at 09/14/18 0411   pantoprazole (PROTONIX) 40 mg in sodium chloride 0.9% 10 mL injection, 40 mg, IntraVENous, Q12H, Luis Angel Castillo MD, 40 mg at 09/14/18 5128   amLODIPine (NORVASC) tablet 10 mg, 10 mg, Oral, DAILY, Luis Angel Castillo MD, 10 mg at 09/14/18 1771   valproate (DEPACON) 750 mg in 0.9% sodium chloride 50 mL IVPB, 750 mg, IntraVENous, Q8H, Matthew Billingsley MD, Last Rate: 50 mL/hr at 09/14/18 1353, 750 mg at 09/14/18 1353   albuterol-ipratropium (DUO-NEB) 2.5 MG-0.5 MG/3 ML, 3 mL, Nebulization, Q6H PRN, Ar Sagastume NP 
  cefTRIAXone (ROCEPHIN) 1 g in 0.9% sodium chloride (MBP/ADV) 50 mL MBP, 1 g, IntraVENous, Q24H, Luis Angel Castillo MD, Last Rate: 100 mL/hr at 09/14/18 1349, 1 g at 09/14/18 1349 
  sodium chloride (NS) flush 10-30 mL, 10-30 mL, InterCATHeter, PRN, Edward Nelson MD 
  sodium chloride (NS) flush 10 mL, 10 mL, InterCATHeter, Q24H, Edward Nelson MD, 10 mL at 09/13/18 1700 
  sodium chloride (NS) flush 10 mL, 10 mL, InterCATHeter, PRN, Edward Nelson MD 
  sodium chloride (NS) flush 10-40 mL, 10-40 mL, InterCATHeter, Q8H, Sofia Mathews MD, 10 mL at 18 1353   bacitracin 500 unit/gram packet 1 Packet, 1 Packet, Topical, PRN, Sofia Mathews MD 
  LORazepam (ATIVAN) injection 1 mg, 1 mg, IntraVENous, PRN, Madhuri Sinclair MD, 1 mg at 18 0300   hydrALAZINE (APRESOLINE) 20 mg/mL injection 20 mg, 20 mg, IntraVENous, Q4H PRN, Anders Gordon NP, 20 mg at 18 4520   sodium chloride (NS) flush 5-10 mL, 5-10 mL, IntraVENous, Q8H, hKang Meng, DO, 10 mL at 18 1353   sodium chloride (NS) flush 5-10 mL, 5-10 mL, IntraVENous, PRN, Elizabeth Musa DO 
  sodium chloride (NS) flush 5-10 mL, 5-10 mL, IntraVENous, PRN, Elizabeth Musa DO 
  acetaminophen (TYLENOL) suppository 650 mg, 650 mg, Rectal, Q6H PRN, Elizabeth Musa DO, 650 mg at 18 0700 
  arformoterol (BROVANA) neb solution 15 mcg, 15 mcg, Nebulization, BID RT, Jl Mclain MD, 15 mcg at 18 5191   budesonide (PULMICORT) 500 mcg/2 ml nebulizer suspension, 500 mcg, Nebulization, BID RT, Jl Mclain MD, 500 mcg at 18 6954   labetalol (NORMODYNE;TRANDATE) 20 mg/4 mL (5 mg/mL) injection 20 mg, 20 mg, IntraVENous, Q4H PRN, Jl Mclain MD, 20 mg at 18 2247 OBJECTIVE Vital Signs:   
Patient Vitals for the past 24 hrs: 
 Temp Pulse Resp BP SpO2  
18 1400 97.8 °F (36.6 °C) 86 16 129/78 92 % 18 1000 97.3 °F (36.3 °C) 83 14 119/63 93 % 18 0854 97.7 °F (36.5 °C) 85 16 125/75 97 % 18 0845 - - - - 98 % 18 0726 97.6 °F (36.4 °C) 89 16 128/75 96 % 18 0512 98.2 °F (36.8 °C) 99 16 156/74 95 % 18 0203 98.8 °F (37.1 °C) 93 16 121/73 94 % 18 2210 98.9 °F (37.2 °C) 99 16 118/61 95 % 18 1940 - - - - 95 % 18 1903 - - - - 95 % 18 1810 97.6 °F (36.4 °C) 75 16 135/78 96 % 18 1616 - 75 - 165/85 -  
   
O2 Device: Room air O2 Flow Rate (L/min): 2 l/min Temp (24hrs), Av °F (36.7 °C), Min:97.3 °F (36.3 °C), Max:98.9 °F (37.2 °C) PHYSICAL EXAM:  
General: sleeping comfortably. Snoring loudly. Head: atraumatic, normocephalic Eye: PERRLA, no scleral icterus, no pallor, no cyanosis Nose: no sinus tenderness, no erythema, no induration, no discharge Throat: ETT in situ. no tonsillar enlargement, no erythema, no exudates, no oral thrush Neck: supple, no thyromegaly, no JVD, no lymphadenopathy. Trachea midline Lung: symmetric in development and expansion, good air entry, no rales, no rhonchi, no wheezing Heart: Regular S1 & S2. No murmur. No rub. No gallop. Abdomen: soft, flat, bowel sounds present. Nontender, nondistended, no rebound, no guarding. Extremities: no edema, no cyanosis, no clubbing, 2+ peripheral pulses in DP Lymphatic: no cervical/supraclavicular/axillary lymphadenopathy DATA:  
 
Intake/Output:  
Last shift:      09/14 0701 - 09/14 1900 In: 240 [P.O.:240] Out: - Last 3 shifts: 09/12 1901 - 09/14 0700 In: 3625 [P.O.:480; I.V.:3810] Out: 2815 [Urine:2815] Intake/Output Summary (Last 24 hours) at 09/14/18 1406 Last data filed at 09/14/18 0945 Gross per 24 hour Intake             1150 ml Output             1825 ml Net             -675 ml Last 3 Recorded Weights in this Encounter 09/09/18 0500 09/10/18 3035 09/13/18 0032 Weight: 111.1 kg (245 lb) 104.1 kg (229 lb 8 oz) 104 kg (229 lb 4.5 oz) Lines: All central lines examined by me. No signs of erythema, induration, discharge. Peripherally Inserted Central Catheter: PICC Triple Lumen 09/11/18 Right;Brachial (Active) Central Line Being Utilized Yes 9/14/2018  7:35 AM  
Criteria for Appropriate Use Limited/no vessel suitable for conventional peripheral access 9/14/2018  7:35 AM  
Site Assessment Clean, dry, & intact 9/14/2018  7:35 AM  
Phlebitis Assessment 0 9/14/2018  7:35 AM  
Infiltration Assessment 0 9/14/2018  7:35 AM  
Arm Circumference (cm) 37 cm 9/14/2018  7:35 AM  
 Date of Last Dressing Change 09/11/18 9/14/2018  7:35 AM  
Dressing Status Clean, dry, & intact 9/14/2018  7:35 AM  
External Catheter Length (cm) 0 centimeters 9/14/2018  7:35 AM  
Dressing Type Disk with Chlorhexadine gluconate (CHG) 9/14/2018  7:35 AM  
Action Taken Open ports on tubing capped 9/14/2018  7:35 AM  
Hub Color/Line Status Flushed;Patent 9/14/2018  7:35 AM  
Positive Blood Return (Site #1) Yes 9/14/2018  7:35 AM  
Hub Color/Line Status Infusing 9/14/2018  7:35 AM  
Positive Blood Return (Site #2) Yes 9/14/2018  7:35 AM  
Hub Color/Line Status Flushed;Capped 9/14/2018  7:35 AM  
Positive Blood Return (Site #3) Yes 9/14/2018  7:35 AM  
Alcohol Cap Used Yes 9/14/2018  7:35 AM  
  
Peripheral Intravenous Line: 
Peripheral IV 09/04/18 Left Forearm (Active) Site Assessment Clean, dry, & intact 9/14/2018  7:35 AM  
Phlebitis Assessment 0 9/14/2018  7:35 AM  
Infiltration Assessment 0 9/14/2018  7:35 AM  
Dressing Status Clean, dry, & intact 9/14/2018  7:35 AM  
Dressing Type Tape;Transparent 9/14/2018  7:35 AM  
Hub Color/Line Status Pink;Flushed 9/14/2018  7:35 AM  
Action Taken Open ports on tubing capped 9/14/2018  7:35 AM  
Alcohol Cap Used Yes 9/14/2018  7:35 AM  
 
 
Telemetry: [] Sinus [] A-flutter [] Paced [] A-fib [] Multiple PVCs Imaging: 
[x] I have personally reviewed the patients radiographs 
[] Radiographs reviewed with radiologist 
[] No CXR study available for review today 
[] No change from prior, tubes and lines are in adequate position 
[] Improved   [] Worsening Ct Head Wo Cont Result Date: 9/13/2018 IMPRESSION: 1. Mildly motion limited examination with interval resolution of cortically based subarachnoid hemorrhage previously noted along the sulci of the right frontal lobe. No definite area of new or acute intra-axial hemorrhage. 2. Paranasal sinus disease as above. Labs: 
Recent Results (from the past 24 hour(s)) POTASSIUM  
 Collection Time: 09/13/18  4:04 PM  
Result Value Ref Range Potassium 4.1 3.5 - 5.5 mmol/L  
VALPROIC ACID Collection Time: 09/14/18  4:05 AM  
Result Value Ref Range Valproic acid 83 50 - 100 ug/ml No results for input(s): FIO2I, IFO2, HCO3I, IHCO3, HCOPOC, PCO2I, PCOPOC, IPHI, PHI, PHPOC, PO2I, PO2POC in the last 72 hours. No lab exists for component: IPOC2 Recent Labs  
   09/13/18 
 0820  09/12/18 
 0400 WBC  14.0*  14.6* HGB  11.3*  12.1 HCT  34.3*  36.2 PLT  199  219 Recent Labs  
   09/13/18 
 1604  09/13/18 
 0509  09/12/18 
 2026  09/12/18 1515  09/12/18 
 0400 NA   --   141   --    --   144  
K  4.1  3.6  3.1*   --   2.7*  
CL   --   104   --    --   104 CO2   --   31   --    --   32  
GLU   --   86   --    --   77 BUN   --   7   --    --   8  
CREA   --   0.55*   --    --   0.59* CA   --   8.6   --    --   8.1*  
MG   --    --   2.0   --   1.8 PHOS   --    --   2.0*   --   1.9* ALB   --    --    --   3.0*   --   
SGOT   --    --    --   39*   --   
ALT   --    --    --   22   --   
 
 
Lab Results Component Value Date/Time Color YELLOW 09/09/2018 11:20 PM  
 Appearance CLEAR 09/09/2018 11:20 PM  
 Specific gravity 1.018 09/09/2018 11:20 PM  
 pH (UA) 7.0 09/09/2018 11:20 PM  
 Protein NEGATIVE  09/09/2018 11:20 PM  
 Glucose 100 (A) 09/09/2018 11:20 PM  
 Ketone TRACE (A) 09/09/2018 11:20 PM  
 Bilirubin NEGATIVE  09/09/2018 11:20 PM  
 Urobilinogen 0.2 09/09/2018 11:20 PM  
 Nitrites NEGATIVE  09/09/2018 11:20 PM  
 Leukocyte Esterase TRACE (A) 09/09/2018 11:20 PM  
 Epithelial cells FEW 09/09/2018 11:20 PM  
 Bacteria NEGATIVE  09/09/2018 11:20 PM  
 WBC 0 to 3 09/09/2018 11:20 PM  
 RBC NEGATIVE  09/09/2018 11:20 PM  
 
Cultures: All Micro Results Procedure Component Value Units Date/Time CULTURE, CSF Minnette Hoe STAIN [[id number]] Collected:  09/09/18 1058 Order Status:  Completed Specimen:  Cerebrospinal Fluid Updated:  09/14/18 5484 Special Requests: NO SPECIAL REQUESTS     
  GRAM STAIN NO WBC'S SEEN     
   NO ORGANISMS SEEN Culture result: NO GROWTH 5 DAYS     
 CULTURE, BLOOD [[id number]] Collected:  09/11/18 1330 Order Status:  Completed Specimen:  Whole Blood from Blood Updated:  09/14/18 0720 Special Requests: PERIPHERAL Culture result: NO GROWTH 3 DAYS     
 CULTURE, BLOOD [[id number]] Collected:  09/11/18 1350 Order Status:  Completed Specimen:  Whole Blood from Blood Updated:  09/14/18 0720 Special Requests: PERIPHERAL Culture result: NO GROWTH 3 DAYS     
 HSV 1 AND 2 BY PCR [[id number]] Collected:  09/09/18 1058 Order Status:  Completed Specimen:  Other from Miscellaneous sample Updated:  09/11/18 2036 Source CEREBROSPINAL FLUID HSV-1 DNA by PCR NEGATIVE      
  HSV-2 DNA by PCR NEGATIVE      
   (NOTE) This test was developed and its performance characteristics 
determined by [facility]. It has not been cleared or 
approved by the U.S. Food and Drug Administration. The FDA has 
determined that such clearance or approval is not necessary. This 
test is used for clinical purposes. It should not be regarded as 
investigational or research. Performed At: [address] [id number] Danielle Florence MD PY:[id number] Jamirminerva Adarsh [[id number]] Collected:  09/09/18 1058 Order Status:  Completed Specimen:  Cerebrospinal Fluid Updated:  09/09/18 1431 Special Requests: NO SPECIAL REQUESTS FUNGUS SMEAR NO FUNGAL ELEMENTS SEEN Culture result: PENDING  
 CRYPTOCOCCAL AG, CSF W/REFLEX TITER [[id number]] Collected:  09/09/18 1058 Order Status:  Completed Specimen:  Cerebrospinal Fluid Updated:  09/09/18 1346 Cryptococcus Ag, CSF NEGATIVE      
 AFB CULTURE + SMEAR W/RFLX PCR AND ID [[id number]] Collected:  09/09/18 1058 Order Status:  Completed Updated:  09/09/18 1127 AFB CULTURE + SMEAR W/RFLX ID FROM CULTURE [[id number]] Order Status:  Canceled CULTURE, CSF  TUBE 2 [[id number]] Order Status:  Canceled Specimen:  Cerebrospinal Fluid HSV BY PCR [[id number]] Order Status:  Canceled Specimen:  Other CULTURE, ANAEROBIC [[id number]] Order Status:  Canceled Specimen: Body fld During this entire length of time I was immediately available to the patient. The reason for providing this level of medical care for this critically ill patient was due a critical illness that impaired one or more vital organ systems such that there was a high probability of imminent or life threatening deterioration in the patients condition. This care involved high complexity decision making to assess, manipulate, and support vital system functions, to treat this degreee vital organ system failure and to prevent further life threatening deterioration of the patients condition 
 
[] Total critical care time spent on reviewing the case/medical record/data/notes/EMR/patient examination/documentation/coordinating care with nurse/consultants, exclusive of procedures - minutes with complex decision making performed and > 50% time spent in face to face evaluation. Ankita Fairbanks MD  
Board Certified by the [facility], [employer] 9/14/2018

## 2018-09-14 NOTE — PROGRESS NOTES
Assume care of patient lying in bed with family members at bedside. Alert but drowsy, arouse with verbal and tactile stimuli. Noted napping frequently at intervals. Bed locked in lowest position. Call light within reach and instructed to use for assistance and needs. Monitoring frequently for LOC change. 2030 Patient remains drowsy and arouses when approached. Able to communicate with staff. 2130 Patient able to use bedpan and urinate 425 cc of clear yellow urine. 2245 Patient lying on stomach at present. Arouse with verbal stimuli to communicate with writer. No seizure activity noted. 09/14/2018 
0000 Patient remains drowsy but easily to arouse and able to follow command. 0200 Turned and reposition self several times. Denies pain or discomfort at present. 0730 Bedside and Verbal shift change report given to CAT Prajapati (oncoming nurse) by Alma Preston RN (offgoing nurse). Report given with SBAR, Kardex, Intake/Output, MAR and Recent Results.

## 2018-09-14 NOTE — PROGRESS NOTES
Problem: Mobility Impaired (Adult and Pediatric) Goal: *Acute Goals and Plan of Care (Insert Text) Physical Therapy Goals Initiated 9/14/2018 and to be accomplished within 7 days. 1.  Patient will complete all bed mobility with supervision/set-up in order to prepare for EOB/OOB activity. 2.  Patient will perform sit <> stand with supervision/set-up in order to prepare for OOB/gait activity. 3.  Patient will perform bed to chair transfers with minimal assistance/contact guard assist in order to promote mobility and encourage seated activity to progress towards their prior level of function. 4.  Patient will ambulate 25 feet with minimal assistance/contact guard assist using LRAD with step through gait in order to prepare for safe negotiation of their environment. Outcome: Progressing Towards Goal 
PHYSICAL THERAPY: Initial Assessment INPATIENT: [facility]: Hospital Day: 6 Patient: Merlin Llanos (6 y.o. female)    Date: 9/14/2018 Primary Diagnosis: Tachycardia Status epilepticus (HCC)  
 ,    
Precautions: Fall, Seizure PLOF: Independent PTA ASSESSMENT : 
Patient supine in bed, agreeable to participation with PT. Reports that she lives alone in a one story home with 3 KATHY. MIN A x 1 for supine <> sit. MIN A x 1 for sit <> stand. Patient requires several attempts to stand d/t posterior LOB landing back on bed. Patient demo's fair seated balance and fair standing balance with AD. Patient ambulates 5 feet with MIN A x 1 without AD; ambulates with short shuffling strides and decreased steadiness. Also demo's decreased step clearance, trunk sway, and bilateral foot slap with gait. Patient has improved steadiness with RW, able to ambulate 8 feet with decreased trunk sway and CGA. Patient returned to bed and left supine with all needs within reach. No c/o pain. Recommend d/c to rehab for gait training with/without AD for improved safety with gait as patient lives alone. Patient presents with deficits in: 
Bed Mobility, Transfers, Gait, Balance and Stairs Patient will benefit from skilled intervention to address the above impairments. Patients rehabilitation potential is considered to be Good Factors which may influence rehabilitation potential include:  
[]         None noted 
[]         Mental ability/status [x]         Medical condition 
[]         Home/family situation and support systems 
[]         Safety awareness 
[]         Pain tolerance/management 
[]         Other: PLAN : 
Recommendations and Planned Interventions: 
[x]           Bed Mobility Training             [x]    Neuromuscular Re-Education 
[x]           Transfer Training                   []    Orthotic/Prosthetic Training 
[x]           Gait Training                          []    Modalities [x]           Therapeutic Exercises          []    Edema Management/Control 
[x]           Therapeutic Activities            [x]    Patient and Family Training/Education 
[]           Other (comment): EDUCATION:  
Education:  Patient was educated on the following topics: strategy for bed mobility and transfers, gait training with AD, safety with gait, purpose of PT, PT POC. Verbalizes understanding. Barriers to Learning/Limitations: None Compensate with: visual, verbal, tactile, kinesthetic cues/model Recommendations for the next treatment session: Gait training Frequency/Duration: Patient will be followed by physical therapy 1-2 times per day/4-7 days per week to address goals. Discharge Recommendations: Rehab Further Equipment Recommendations for Discharge: rolling walker Factors which may impact discharge planning: N/A  
 
SUBJECTIVE:  
Patient stated I need to go to the bathroom.  OBJECTIVE DATA SUMMARY:  
 
Past Medical History:  
Diagnosis Date  Abdominal pain  Anxiety  COPD (chronic obstructive pulmonary disease) (HCC)  DJD (degenerative joint disease) of cervical spine  Elevated cholesterol  GERD (gastroesophageal reflux disease) H/O--NOT ON MEDICATION AT PRESENT  
 Hypertension  Nausea and vomiting Past Surgical History:  
Procedure Laterality Date  EXCISION TUMOR SOFT TISSUE FOOT/TOE SUBQ <1.5CM  2011  
 2 mccartney neuroma right foot  HX CARPAL TUNNEL RELEASE  2002  HX CHOLECYSTECTOMY  10/14/2013  HX HYSTERECTOMY  1994  
 vaginal (ovaries still in) [address]  [place] REMOVAL 1ST/CERVICAL RIB  1984  
 right side 1st rib  REMOVAL OF RIB(S)  1976  
 left side 1st rib Eval Complexity: History: MEDIUM  Complexity : 1-2 comorbidities / personal factors will impact the outcome/ POC Exam:MEDIUM Complexity : 3 Standardized tests and measures addressing body structure, function, activity limitation and / or participation in recreation  Presentation: MEDIUM Complexity : Evolving with changing characteristics  Clinical Decision Making:Medium Complexity [facility] Standing Balance Scale 2/5 Overall Complexity:MEDIUM 
 
G CODES:Mobility [id number] Current  CL= 60-79%   Goal  CK= 40-59%. The severity rating is based on the Other [facility] Balance Scale 2/5 [employer] Standing Balance Scale 2/5 
0: Pt performs 25% or less of standing activity (Max assist) CN, 100% impaired. 1: Pt supports self with upper extremities but requires therapist assistance. Pt performs 25-50% of effort (Mod assist) CM, 80% to <100% impaired. 1+: Pt supports self with upper extremities but requires therapist assistance. Pt performs >50% effort. (Min assist). CL, 60% to <80% impaired. 2: Pt supports self independently with both upper extremities (walker, crutches, parallel bars). CL, 60% to <80% impaired. 2+: Pt support self independently with 1 upper extremity (cane, crutch, 1 parallel bar). CK, 40% to <60% impaired. 3: Pt stands without upper extremity support for up to 30 seconds. CK, 40% to <60% impaired. 3+: Pt stands without upper extremity support for 30 seconds or greater. CJ, 20% to <40% impaired. 4: Pt independently moves and returns center of gravity 1-2 inches in one plane. CJ, 20% to <40% impaired. 4+: Pt independently moves and returns center of gravity 1-2 inches in multiple planes. CI, 1% to <20% impaired. 5: Pt independently moves and returns center of gravity in all planes greater than 2 inches. CH, 0% impaired. Prior Level of Function/Home Situation:  
Home Situation Home Environment: Private residence # Steps to Enter: 3 Rails to Enter: Yes One/Two Story Residence: One story Living Alone: Yes Support Systems: Child(na) Patient Expects to be Discharged to[de-identified] Private residence Current DME Used/Available at Home: NoneCritical Behavior: 
Neurologic State: Alert;Drowsy Orientation Level: Oriented X4 Cognition: Follows commands Psychosocial 
Patient Behaviors: Calm; Cooperative Family  Behaviors: Cooperative Needs Expressed: Educational 
Purposeful Interaction: Yes  
Manual Muscle Testing (LE) Deferred, patient requesting to return to bed following mobility, Demo's fucntional strength Tone : normalSensation: NT 
Range Of Motion: [facility] Functional Mobility: 
 
 
Functional Status Indep (I) Mod I Super-vision Min A Mod A Max A Total A Assist x2 Verbal cues Additional time Not tested Comments Rolling []  []  [] []    []    []  []  [] [] [] [x] Supine to sit []  []  [] [x]  []  []  []  [] [] [] [] Sit to supine []  []  [x] []  []  []  []  [] [] [] [] Sit to stand []  []  [] [x]  []  []  []  [] [] [] []   
Stand to sit []  []  [] [x]  []  []  []  [] [] [] [] Bed to chair transfers []  []  [] []  []  []  []  [] [] [] [x] Balance Good Adron Hearing Poor Unable Not tested Comments Sitting static []  [x]  []  []  [] Sitting dynamic []  [x]  []  []  []   
Standing static []  [x]  []  []  []   
Standing dynamic []  [x]  []  []  [] Mobility/Gait:  
Level of Assistance: Minimum assistance Assistive Device: rolling walker Distance Ambulated: 10 feet (5 ft x 2) Left Lower Extremity: FWB Right Lower Extremity: FWB Base of Support: center of gravity altered Speed/Mariana: pace decreased (<100 feet/min) Step Length: left shortened and right shortened Swing Pattern: left asymmetrical and right asymmetrical 
Stance: [facility] Gait Abnormalities: altered arm swing, decreased step clearance, shuffling gait, step to gait, trunk sway increase and Bilateral foot slap Pain: None Vital Signs Temp: 97.7 °F (36.5 °C) Pulse (Heart Rate): 85    
BP: 125/75 Resp Rate: 16    
O2 Sat (%): 97 % Activity Tolerance:  
Fair Please refer to the flowsheet for vital signs taken during this treatment. After treatment:  
[]         Patient left in no apparent distress sitting up in chair 
[x]         Patient left in no apparent distress in bed 
[x]         Call bell left within reach [x]         Nursing notified 
[]         Caregiver present 
[]         Bed alarm activated COMMUNICATION/EDUCATION:  
[x]         Fall prevention education was provided and the patient/caregiver indicated understanding. [x]         Patient/family have participated as able in goal setting and plan of care. [x]         Patient/family agree to work toward stated goals and plan of care. []         Patient understands intent and goals of therapy, but is neutral about his/her participation. []         Patient is unable to participate in goal setting and plan of care. Recommendations for nursing: 
Written on communication board: up with assist x 1 Thank you for this referral. 
Kimberly Mi Time Calculation: 18 mins

## 2018-09-14 NOTE — PROGRESS NOTES
Problem: Falls - Risk of 
Goal: *Absence of Falls Document Mulugeta Dill Fall Risk and appropriate interventions in the flowsheet. Outcome: Progressing Towards Goal 
Fall Risk Interventions: 
  
 
Mentation Interventions: Bed/chair exit alarm, Door open when patient unattended, More frequent rounding Medication Interventions: Bed/chair exit alarm Elimination Interventions: Call light in reach Problem: Pressure Injury - Risk of 
Goal: *Prevention of pressure injury Document Glenn Scale and appropriate interventions in the flowsheet. Outcome: Progressing Towards Goal 
Pressure Injury Interventions: 
Sensory Interventions: Monitor skin under medical devices Moisture Interventions: Absorbent underpads Activity Interventions: Pressure redistribution bed/mattress(bed type) Mobility Interventions: Pressure redistribution bed/mattress (bed type) Nutrition Interventions: Document food/fluid/supplement intake Friction and Shear Interventions: Foam dressings/transparent film/skin sealants

## 2018-09-14 NOTE — PROGRESS NOTES
[facility] Hospitalist Division Inpatient Daily Progress Note Patient: Zander West MRN: [id number]  CSN: [id number] YOB: 1960  Age: 60 y.o. Sex: female DOA: 9/9/2018 LOS:  LOS: 5 days Chief Complaint:  H/A, muscle spasms Interval History: PMHx of COPD, anxiety, HTN, GERD; presented to ED with c/o severe H/A and muscle spasms. Pt was stable in ED with teleneuro consult with plans for observation and MRI. Pt started to have actively seizure w/ tonic-clonic activity and eye deviation to the left. 8mg of ativan given, but refractory. Keppra load refractory. Depakote load of 1500mg; 1500 mg PB. Neurology consulted. LP completed-CSF negative tubes 1&4. CT head negative. History of isolated WBCs. Recently c/o left sided numbness per pt's daughter-had been prescribed lyrica and flexeril. Takes xanax nightly. No h/o seizure. SBP elevated in ED. MRI c/w small area of subarachnoid hemorrhage on cortical surface right frontal lobe plus patchy brain edema, possible 2/2 malignant hypertension. UDS + benzo, thc. Fungal prep negative. Cryptococcal antigen negative. Tube 1: 0 WBCs and 21 RBCs. Tube 4: 0 WBCs and 111 RBCs. Protein 40, glucose 71.  
9/10/18: EEG completed-results pending. Family updated. SBP < 140-prn increased; added cozaar. Await neurology recommendations. Need to address feeding-TF? -will defer ICU. Start phenobarbital wean and check levels. Acyclovir stopped 2/2 CSF WBC count 0, CSF glucose and protein levels normal.  
9/11/18: EEG completed-per neurology's reading-Abnormal recording revealing the presence of moderate slowing of the background activity consistent with encephalopathy. This is a nonspecific finding and may be due to toxic, metabolic or inflammatory causes. No epileptiform activity was noted. (per neuro reading).  Following commands today; able to verbalize, open eyes to name. Restless-sitter ordered. Pending CSF studies paraneoplastic profile, NMDA receptor antibodies, HSV1-2 PCR. WBCs w/ slight improvement. Replace K-lytes protocol added. Phenobarbital level check in. SBP < 140 (currently ok). Urinary retention-straight cath X2, bladder scan prn, place ken if needed (but would overall try to avoid w/ elevated white count and unclear etiology?). Will need PT/OT/Speech when improved for discharge planning. Assess feeding-has NG tube. Echo ordered, but not yet completed. Continue to follow neuro status-slowly improving. 9/12/18: Patient following commands, but still restless and states she is \"falling out of bed\". Speech still garbled and hard to understand. Wbc's slight improvement; afebrile. Lytes off; replace per protocol. K continues low, will add K to fluid as well. Keep SBP < 140. Blood cultures NGTD. HSV 1/2 negative. Other serologies pending. Will await neurology recommendations. PB d/c'd yesterday. Ativan d/c'd but then given for seizure like activity per RN staff during night. 9/13/18: Transfer out of ICU. Remove ken; voiding trial. NG tube out. Swallow eval-clear liquid diet-advanced as tolerated. MRI today pending. Sitter d/c'd. K improved. SBP ok. On RA. WBCs continue to improve. Afebrile. GI consulted for NG tube w/ red aspirate. 9/14/18: Protonix to oral. Seizure meds still IV-can switch to oral whenever neuro ok'd. MRI retry today. Still c/o H/A. NMDA receptor antibody pending-defer neuro. VSS. Will await MRI results-hopefully can complete today w/ pt compliance. Plan to d/c tomorrow. Will need to know neurology's recommendations on discharge medications for seizure medications outpatient and follow-up. Subjective:  
  
Alert, oriented Following commands C/o H/A-subsiding w/ norco  
Objective:  
  
Visit Vitals  /75 (BP 1 Location: Left arm, BP Patient Position: At rest)  Pulse 85  Temp 97.7 °F (36.5 °C)  Resp 16  
 Ht 5' 5\" (1.651 m)  Wt 104 kg (229 lb 4.5 oz)  SpO2 97%  BMI 38.15 kg/m2 Physical Exam: 
General appearance: alert, cooperative, no distress Lungs: clear to auscultation bilaterally Heart: regular rate and rhythm, S1, S2 normal 
Abdomen: soft, non tender, non distended. Normoactive bowel sounds. Extremities: extremities normal, atraumatic, no cyanosis or edema Skin: Skin color, texture, turgor normal.  
Neurologic: PERRL 2mm b/l, follows commands x4, AOX4 Intake and Output: 
Current Shift:    
Last three shifts:  09/12 1901 - 09/14 0700 In: 5142 [P.O.:480; I.V.:3810] Out: 2815 [Urine:2815] Recent Results (from the past 24 hour(s)) POTASSIUM Collection Time: 09/13/18  4:04 PM  
Result Value Ref Range Potassium 4.1 3.5 - 5.5 mmol/L Lab Results Component Value Date/Time Glucose 86 09/13/2018 05:09 AM  
 Glucose 77 09/12/2018 04:00 AM  
 Glucose 91 09/11/2018 03:45 AM  
 Glucose 162 (H) 09/09/2018 04:45 AM  
 Glucose 147 (H) 09/05/2018 12:55 PM  
 EXAM: CTA HEAD AND NECK 
  INDICATION: Subarachnoid hemorrhage, possible aneurysm 
  
COMPARISON: MR brain 9/9/2018 
  
TECHNIQUE:  Multiple axial CT images of the neck were obtained extending from 
the level of the aortic arch to the skull base after the administration of the 
IV contrast utilizing a CTA protocol. Maximum intensity projection 
reconstructions were performed in multiple planes. Multiple axial CT images of the head were obtained extending from below the 
level of the skull base to the vertex after the administration of the IV 
contrast utilizing a CTA protocol. Maximum intensity projection reconstructions 
were performed in three planes. Additional 3 D reconstructions were performed 
at a separate workstation.  
One or more dose reduction techniques were used on this CT: automated exposure 
control, adjustment of the mAs and/or kVp according to patient's size, and 
 iterative reconstruction techniques. The specific techniques utilized on this CT 
exam have been documented in the patient's electronic medical record. 
  
FINDINGS: 
  
Motion artifact is present which limits evaluation. 
  
CTA NECK 
  
Mild amount of atherosclerotic calcifications are present along the aortic arch. No high-grade proximal great vessel origin stenosis is seen. Mild narrowing of 
the proximal right subclavian artery origin is noted. 
  
The left common carotid artery is mildly irregular. The left carotid 
bifurcation is moderately irregular. Estimated minimal luminal diameter proximal 
left ICA 2.7 mm. Estimated luminal diameter of normal left ICA cervical segment 
is 5.1 mm. Estimated degree of stenosis of the left ICA based upon NASCET 
criteria is 48%. Remainder of left ICA cervical segment is minimally irregular. 
  
The right common carotid artery is slightly irregular. The right carotid 
bifurcation is mildly irregular. Estimated minimal luminal diameter proximal 
right ICA 3.7 mm. Estimated luminal diameter of normal right ICA cervical 
segment is 4.7 mm. Estimated degree of stenosis of the right ICA based upon NASCET criteria is 22%. Remainder of right ICA cervical segment is a normal 
variant vascular loop without focal stenosis. 
  
Left vertebral artery is mildly dominant. No focal vertebral artery stenosis is 
seen. 
  
Degenerative changes are seen in the spine. Nasogastric tube is present 
extending inferiorly out of the field-of-view in the esophagus. 
  
Air-fluid level is present in the right maxillary antrum. Additional right 
frontal sinus air-fluid level is present. Mild mucoperiosteal thickening seen in 
the ethmoidal air cells. Mastoid air cells are unremarkable. 
  
CTA HEAD 
  
There is no focal high-grade stenosis within the intracranial arteries. Mild 
atherosclerotic irregularity is seen in the bilateral carotid siphons without high grade stenosis. The carotid terminus is unremarkable.  
  
No high-grade ANDREA stenosis is seen. Very small anterior to indicating artery is 
suggested. Slight irregularity seen in the bilateral MCA vessels mostly along 
distal branches. 
  
Left vertebral artery is mildly dominant intracranially. PICA origins are 
unremarkable. Mild tortuosity is present involving the basilar artery without 
focal stenosis. Mild irregularity is seen in bilateral PCA branches distally. The dural venous sinuses are patent. Sharply defined rounded hypodensities are 
present distally in the transverse sinuses bilaterally compatible with 
pacchionian arachnoid granulation. No abnormal vessels are seen towards the 
vertex on the right in location of previously suggested small volume 
subarachnoid hemorrhage. 
  
  
_________________________ 
  
IMPRESSION IMPRESSION: 
  
1. No intracranial aneurysm or vascular malformation is identified to suggest 
etiology of previously seen small volume subarachnoid hemorrhage. 
  
2.  Mild to moderate irregularity involving bilateral proximal ICA, estimated 48% stenosis on the left and 22% stenosis on the right, based on NASCET 
criteria.  
  
3. No high-grade vertebral artery stenosis. 
  
4. No definite high-grade intracranial stenosis. Mild multifocal irregularity 
and narrowing involving both anterior and posterior circulation, greatest 
involving the bilateral PCA. This finding is nonspecific but may represent 
intracranial atherosclerotic disease. Difficult to exclude other vasculitic 
processes. 
  
5. No evidence of dural venous sinus thrombosis. Assessment/Plan:  
 
Patient Active Problem List  
Diagnosis Code  DJD (degenerative joint disease) M19.90  
 HTN (hypertension) I10  
 COPD (chronic obstructive pulmonary disease) ([facility] J44.9  Status post laparoscopic cholecystectomy Z90.49  Tachycardia R00.0  Status epilepticus ([address] G40.901  Anxiety F41.9  GERD (gastroesophageal reflux disease) K21.9  Acute kidney failure (HCC) N17.9  Erythrocytosis D75.1  Lymphocytosis D72.820  
 Positive urine drug screen R82.5  Postictal state ([address] R56.9 A/P: 
 
Seizure Activity/acute encephalopathy  
-2/2 PRES and small SAH (MRI pending) -neuro following-appreciate  
-PB, depakote-PB d/c'd; vimpat started  
-seizure precautions- 
-CT head negative -EEG completed 9/10-IMPRESSION:  Abnormal recording revealing the presence of moderate slowing of the background activity consistent with encephalopathy. This is a nonspecific finding and may be due to toxic, metabolic or inflammatory causes. No epileptiform activity was noted. (per neuro reading) -MRI c/w small area of subarachnoid hemorrhage on cortical surface right frontal lobe plus patchy brain edema, possible 2/2 malignant hypertension. -LP completed-CSF tubes 1 & 4 negative ; acyclovir d/c'd  
-will await further recommendations from neurology  
-will d/c sitter as significant improvement in MS Subarachnoid bleed  
-neurology following -appreciate  
-MRI showed small area of subarachnoid hemorrhage on cortical surface right frontal lobe plus patchy brain edema, possible 2/2 malignant hypertension. -SBP < 140  (currently BP ok) -CTA negative for aneurysm  
-MRI pending today -was unable to tolerate-retry again today PRES  
-keep SBP < 140  
-norvasc 5mg started  
-increased prn, added scheduled hydrochlorathiazide  
-hold cozaar until kidney numbers improve COPD 
-sat goal > 90% (titrate down O2 as tolerated) -on RA  
-PCCM following 
-pulmicort/brovana UTI 
-rocephin  
 
Urinary retention 
-straight cath X 2 
-bladder scan prn 
-remove ken for repeat voiding trial; bladder scan if no void after 6 hours post removal  
 
?GI bleed 
-protonix oral 
-GI consulted-appreciate 
-no active s/s GI bleed GERD  
-protonix DVT prophylaxis 
-SCDs GI-protonix Kobe Crowell Transfer out of ICU 9/13/18 CBC, BMP am 
 
PT/OT Marnie Baig, ERINPC, NP-C [facility] Hospitalist Division [phone] Office:  [phone]

## 2018-09-14 NOTE — PROGRESS NOTES
Bedside and Verbal shift change report given to  Sixto Wright RN (oncoming nurse) by Vladislav Martell RN (offgoing nurse). Report included the following information SBAR, Kardex, Intake/Output, MAR and Recent Results.

## 2018-09-14 NOTE — PROGRESS NOTES
Assumed care from Ari Han , [place]. Pt resting in bed with no signs of pain or distress. Bed locked and in lowest position with call bell in reach. 1620 Pt was drowsy throughout the day. At 1620 Pt became more alert and was able to feed herself and assisted to the bedside commode.

## 2018-09-14 NOTE — PROGRESS NOTES
Progress Note Patient: Nany Schultz MRN: [id number]  SSN: [id number] YOB: 1960  Age: 60 y.o. Sex: female Admit Date: 9/9/2018 LOS: 5 days Subjective: No seizure activity reported. Patient has a discharge summary to rehab hospital in the notes. Patient currently is on Depacon 750 mg IV tid and Vimpat 100 mg bid IV. MRI of the brain done today was done but was limited by motion artifact. It showed improvement in the patchy abnormal signal seen in bilateral parietal and occipital lobes, as well as in the posterior left frontal lobe. No new areas of abnormal signal noted. Previously noted leptomeningeal enhancement in the parietal and occipital regions was improved. Subtle suggestion of blood products seen in the posterior right frontal sulcus towards the vertex; No new hemorrhage noted. CT of the head done yesterday showed resolution of the [facility]. AM Depakote level 83. CSF NMDA antibodies negative. Patient was alert but confused this AM.  Had HA and was given NOrco, which helped. Patient then was given Ativan for MRI and now is completely sedated. Objective:  
 
Vitals:  
 09/14/18 0845 09/14/18 0854 09/14/18 1000 09/14/18 1400 BP:  125/75 119/63 129/78 Pulse:  85 83 86 Resp:  16 14 16 Temp:  97.7 °F (36.5 °C) 97.3 °F (36.3 °C) 97.8 °F (36.6 °C) SpO2: 98% 97% 93% 92% Weight:      
Height:      
  
 
Physical Exam:  
Sleeping comfortably. Half opened eyes to stim but did not obey commands. PERRL, gaze conjugate and midline. Face symmetric at rest.  Tone reduced all 4 exts. Did not obey commands. Lab/Data Review: 
Recent Results (from the past 48 hour(s)) AMMONIA Collection Time: 09/12/18  4:15 PM  
Result Value Ref Range Ammonia 11 11 - 32 UMOL/L  
CALCIUM, IONIZED Collection Time: 09/12/18  8:26 PM  
Result Value Ref Range  Ionized Calcium 1.08 (L) 1.12 - 1.32 MMOL/L  
POTASSIUM  
 Collection Time: 09/12/18  8:26 PM  
Result Value Ref Range Potassium 3.1 (L) 3.5 - 5.5 mmol/L MAGNESIUM Collection Time: 09/12/18  8:26 PM  
Result Value Ref Range Magnesium 2.0 1.6 - 2.6 mg/dL PHOSPHORUS Collection Time: 09/12/18  8:26 PM  
Result Value Ref Range Phosphorus 2.0 (L) 2.5 - 4.9 MG/DL  
VALPROIC ACID Collection Time: 09/13/18  5:09 AM  
Result Value Ref Range Valproic acid 85 50 - 100 ug/ml METABOLIC PANEL, BASIC Collection Time: 09/13/18  5:09 AM  
Result Value Ref Range Sodium 141 136 - 145 mmol/L Potassium 3.6 3.5 - 5.5 mmol/L Chloride 104 100 - 108 mmol/L  
 CO2 31 21 - 32 mmol/L Anion gap 6 3.0 - 18 mmol/L Glucose 86 74 - 99 mg/dL BUN 7 7.0 - 18 MG/DL Creatinine 0.55 (L) 0.6 - 1.3 MG/DL  
 BUN/Creatinine ratio 13 12 - 20 GFR est AA >60 >60 ml/min/1.73m2 GFR est non-AA >60 >60 ml/min/1.73m2 Calcium 8.6 8.5 - 10.1 MG/DL  
CBC W/O DIFF Collection Time: 09/13/18  8:20 AM  
Result Value Ref Range WBC 14.0 (H) 4.6 - 13.2 K/uL  
 RBC 3.80 (L) 4.20 - 5.30 M/uL  
 HGB 11.3 (L) 12.0 - 16.0 g/dL HCT 34.3 (L) 35.0 - 45.0 % MCV 90.3 74.0 - 97.0 FL  
 MCH 29.7 24.0 - 34.0 PG  
 MCHC 32.9 31.0 - 37.0 g/dL  
 RDW 13.6 11.6 - 14.5 % PLATELET 264 382 - 303 K/uL MPV 10.3 9.2 - 11.8 FL  
GLUCOSE, POC Collection Time: 09/13/18  8:36 AM  
Result Value Ref Range Glucose (POC) 111 (H) 70 - 110 mg/dL POTASSIUM Collection Time: 09/13/18  4:04 PM  
Result Value Ref Range Potassium 4.1 3.5 - 5.5 mmol/L  
VALPROIC ACID Collection Time: 09/14/18  4:05 AM  
Result Value Ref Range Valproic acid 83 50 - 100 ug/ml Assessment:  
 
Principal Problem: 
  Status epilepticus ([facility] (9/9/2018) Active Problems: 
  HTN (hypertension) (9/27/2013) COPD (chronic obstructive pulmonary disease) ([facility] (9/27/2013) Anxiety (9/9/2018) GERD (gastroesophageal reflux disease) (9/9/2018) Overview: H/O--NOT ON MEDICATION AT PRESENT Acute kidney failure ([facility] (9/9/2018) Erythrocytosis (9/9/2018) Lymphocytosis (9/9/2018) Positive urine drug screen (9/9/2018) Overview: THC 
 
  SAH (subarachnoid hemorrhage) ([facility] (9/14/2018) PRES (posterior reversible encephalopathy syndrome) (9/14/2018) Plan: My colleague reviewed this case and pointed out that the symptoms are classic for reversible cerebral vasoconstriction syndrome. This is similar to PRES but includes headaches and can include the [facility] that the patient suffered. It can be associated with use of serotonergic medications, such as the Lexapro that the patient was taking as an outpatient. Calcium channel blockers are beneficial for the vasoconstriction and BP control. Lexapro should be discontinued. I do not think that the patient has been receiving it as an in patient. The patient's BP is quite low at times. She already is on amlodipine. Continue Depakote and Vimpat. Depakote levels should be checked intermittently, along with LFTs and NH3. This patient is NOT ready for transfer to rehab. Spoke to patient's nurse, and no plans for that at present, despite presence of discharge summary in notes. The patient has been sedated frequently over the last couple of days. Will discontinue Ativan so she can be more alert and her status better assessable. Discussed above with one of the patient's daughters. Signed By: Kingston Mckeon MD   
 September 14, 2018

## 2018-09-14 NOTE — PROGRESS NOTES
Went in to speak with pt about snf, she was sleeping dtr did not want me to wake her up . Gave snf list to dtr, she states she will discuss with her sister and give me few choices. Therapy recommends snf.

## 2018-09-14 NOTE — DISCHARGE SUMMARY
2 [facility]  Hospitalist Division    Discharge Summary    Patient: Merlin Llanos MRN: [id number]  CSN: [id number]    YOB: 1960  Age: 60 y.o. Sex: female    DOA: 9/9/2018 LOS:  LOS: 5 days   Discharge Date:      Admission Diagnoses: Tachycardia  Status epilepticus [facility])    Discharge Diagnoses:    Problem List as of 9/14/2018  Date Reviewed: 9/9/2018          Codes Class Noted - Resolved    Tachycardia ICD-10-CM: R00.0  ICD-9-CM: 785.0  9/9/2018 - Present        * (Principal)Status epilepticus ([facility] ICD-10-CM: G40.901  ICD-9-CM: 345.3  9/9/2018 - Present        Anxiety ICD-10-CM: F41.9  ICD-9-CM: 300.00  9/9/2018 - Present        GERD (gastroesophageal reflux disease) ICD-10-CM: K21.9  ICD-9-CM: 530.81  9/9/2018 - Present    Overview Signed 9/9/2018 10:05 AM by Chris Gould DO     H/O--NOT ON MEDICATION AT PRESENT             Acute kidney failure ([facility] ICD-10-CM: N17.9  ICD-9-CM: 584.9  9/9/2018 - Present        Erythrocytosis ICD-10-CM: D75.1  ICD-9-CM: 289.0  9/9/2018 - Present        Lymphocytosis ICD-10-CM: D72.820  ICD-9-CM: 288.61  9/9/2018 - Present        Positive urine drug screen ICD-10-CM: R82.5  ICD-9-CM: 796.0  9/9/2018 - Present    Overview Signed 9/9/2018 11:06 AM by Javier Ramirez MD     THC             Postictal state [facility]) ICD-10-CM: R56.9  ICD-9-CM: 780.39  9/9/2018 - Present        Status post laparoscopic cholecystectomy ICD-10-CM: Z90.49  ICD-9-CM: V45.89  10/22/2013 - Present        DJD (degenerative joint disease) ICD-10-CM: M19.90  ICD-9-CM: 715.90  9/27/2013 - Present        HTN (hypertension) ICD-10-CM: I10  ICD-9-CM: 401.9  9/27/2013 - Present        COPD (chronic obstructive pulmonary disease) ([facility] ICD-10-CM: J44.9  ICD-9-CM: 467  9/27/2013 - Present              Discharge Condition: Good    Discharge To:  Home    Consults: Gastroenterology, Neurology and [address] [place] [address] Course:     Taniak Santana is a 59 yo female with PMHx of COPD, anxiety, HTN, GERD; presented to ED with c/o severe H/A and muscle spasms. Pt was stable in ED with teleneuro consult with plans for observation and MRI. Pt started to have active seizure w/ tonic-clonic activity and eye deviation to the left. 8mg of ativan given, but refractory. Keppra load refractory. Depakote load of 1500mg; 1500 mg PB. The patient was admitted for acute encephalopathy, PRES, small subarachnoid bleed, and seizure activity. Neurology consulted. LP completed-CSF negative tubes 1&4. CT head negative. History of isolated WBCs (also taking steroids per daughters at home). Recently c/o left sided numbness per pt's daughter-had been prescribed lyrica and flexeril. Takes xanax nightly. Compliant with BP medications per daughters. No h/o seizure. SBP elevated in ED. MRI c/w small area of subarachnoid hemorrhage on cortical surface right frontal lobe plus patchy brain edema, possible 2/2 malignant hypertension. UDS positive for benzo, thc. Fungal prep negative. Cryptococcal antigen negative. Tube 1: 0 WBCs and 21 RBCs. Tube 4: 0 WBCs and 111 RBCs. Protein 40, glucose 71. EEG completed, -per neurology's reading-Abnormal recording revealing the presence of moderate slowing of the background activity consistent with encephalopathy. This is a nonspecific finding and may be due to toxic, metabolic or inflammatory causes. No epileptiform activity was noted. (per neuro reading). Patient was placed on acyclovir, discontinued 2/2 CSF WBC count 0, CSF glucose and protein levels normal. PB weaned down and discontinued 2/2 sedative factors. Vimpat/depakon/Ativan given for seizure activity while inpatient. Urinary retention, straight catheterized and ken placed. Ken d/c'd and urinating ok. No retention issues present. Sitter ordered for restlessness and confusion. Patient started to become more alert with holding of sedative medications.  Following commands, alert and oriented to time, place, and person. Red aspiration noted in NG tube, GI consulted, no active s/s of bleeding. Protonix given. NG tube d/c'd. Swallow evaluation passed; patient placed on clear liquid diet, tolerating well. Advancing as tolerated. MRI results pending for continued headache, improved with norco. WBC's continue to improve. VSS. NMDA receptor antibody pending. HSV 1-2 PCR negative. Anti-Hu and anti-RI antibodies negative. The patient is currently AOX4 and following commands. She had not had any seizure activity in the past 24 hours. The patient will be discharged to rehab today. The patient should follow-up with PCP/neurology in regards to recent hospitalization. Patient to arrange. Physical Exam:  General appearance: alert, cooperative, no distress    Lungs: clear to auscultation bilaterally  Heart: regular rate and rhythm, S1, S2 normal  Abdomen: soft, non tender, non distended. Normoactive bowel sounds. Extremities: extremities normal, atraumatic, no cyanosis or edema  Skin: Skin color, texture, turgor normal.   Neurologic: PERRL 2mm b/l, follows commands x4, AOX4     Significant Diagnostic Studies:    EXAM: CTA HEAD AND NECK      INDICATION: Subarachnoid hemorrhage, possible aneurysm      COMPARISON: MR brain 9/9/2018      TECHNIQUE:  Multiple axial CT images of the neck were obtained extending from  the level of the aortic arch to the skull base after the administration of the  IV contrast utilizing a CTA protocol.  Maximum intensity projection  reconstructions were performed in multiple planes. Multiple axial CT images of the head were obtained extending from below the  level of the skull base to the vertex after the administration of the IV  contrast utilizing a CTA protocol.  Maximum intensity projection reconstructions  were performed in three planes.   Additional 3 D reconstructions were performed  at a separate workstation.   One or more dose reduction techniques were used on this CT: automated exposure  control, adjustment of the mAs and/or kVp according to patient's size, and  iterative reconstruction techniques. The specific techniques utilized on this CT  exam have been documented in the patient's electronic medical record.      FINDINGS:      Motion artifact is present which limits evaluation.      CTA NECK      Mild amount of atherosclerotic calcifications are present along the aortic arch. No high-grade proximal great vessel origin stenosis is seen. Mild narrowing of  the proximal right subclavian artery origin is noted.      The left common carotid artery is mildly irregular.  The left carotid  bifurcation is moderately irregular. Estimated minimal luminal diameter proximal  left ICA 2.7 mm. Estimated luminal diameter of normal left ICA cervical segment  is 5.1 mm.  Estimated degree of stenosis of the left ICA based upon NASCET  criteria is 48%.  Remainder of left ICA cervical segment is minimally irregular.      The right common carotid artery is slightly irregular.  The right carotid  bifurcation is mildly irregular. Estimated minimal luminal diameter proximal  right ICA 3.7 mm. Estimated luminal diameter of normal right ICA cervical  segment is 4.7 mm.  Estimated degree of stenosis of the right ICA based upon  NASCET criteria is 22%.  Remainder of right ICA cervical segment is a normal  variant vascular loop without focal stenosis.      Left vertebral artery is mildly dominant. No focal vertebral artery stenosis is  seen.      Degenerative changes are seen in the spine. Nasogastric tube is present  extending inferiorly out of the field-of-view in the esophagus.      Air-fluid level is present in the right maxillary antrum. Additional right  frontal sinus air-fluid level is present. Mild mucoperiosteal thickening seen in  the ethmoidal air cells. Mastoid air cells are unremarkable.      CTA HEAD      There is no focal high-grade stenosis within the intracranial arteries.  Mild  atherosclerotic irregularity is seen in the bilateral carotid siphons without  high grade stenosis. The carotid terminus is unremarkable.       No high-grade ANDREA stenosis is seen. Very small anterior to indicating artery is  suggested. Slight irregularity seen in the bilateral MCA vessels mostly along  distal branches.      Left vertebral artery is mildly dominant intracranially. PICA origins are  unremarkable. Mild tortuosity is present involving the basilar artery without  focal stenosis. Mild irregularity is seen in bilateral PCA branches distally. The dural venous sinuses are patent. Sharply defined rounded hypodensities are  present distally in the transverse sinuses bilaterally compatible with  pacchionian arachnoid granulation. No abnormal vessels are seen towards the  vertex on the right in location of previously suggested small volume  subarachnoid hemorrhage.          _________________________  Najma Citizen  IMPRESSION  IMPRESSION:      1.  No intracranial aneurysm or vascular malformation is identified to suggest  etiology of previously seen small volume subarachnoid hemorrhage.      2.  Mild to moderate irregularity involving bilateral proximal ICA, estimated  48% stenosis on the left and 22% stenosis on the right, based on NASCET  criteria.       3. No high-grade vertebral artery stenosis.      4. No definite high-grade intracranial stenosis. Mild multifocal irregularity  and narrowing involving both anterior and posterior circulation, greatest  involving the bilateral PCA. This finding is nonspecific but may represent  intracranial atherosclerotic disease. Difficult to exclude other vasculitic  processes.      5. No evidence of dural venous sinus thrombosis.         Discharge Medications:     Current Discharge Medication List      CONTINUE these medications which have NOT CHANGED    Details   escitalopram oxalate (LEXAPRO) 20 mg tablet Take 20 mg by mouth daily.       pregabalin (LYRICA) 50 mg capsule Take 50 mg by mouth three (3) times daily. HYDROcodone-acetaminophen (NORCO) 5-325 mg per tablet Take 1 Tab by mouth every eight (8) hours as needed for Pain. Qty: 10 Tab, Refills: 0      ondansetron (ZOFRAN ODT) 8 mg disintegrating tablet Take 1 Tab by mouth every eight (8) hours as needed for Nausea. Qty: 12 Tab, Refills: none      fluticasone-salmeterol (ADVAIR DISKUS) 250-50 mcg/dose diskus inhaler Take 1 Puff by inhalation every twelve (12) hours. omega-3 fatty acids-vitamin e (FISH OIL) 1,000 mg cap Take 1 Cap by mouth. cyclobenzaprine (FLEXERIL) 10 mg tablet Take  by mouth three (3) times daily as needed. losartan-hydrochlorothiazide (HYZAAR) 100-25 mg per tablet Take 1 Tab by mouth daily. ALPRAZolam (XANAX) 0.5 mg tablet Take  by mouth. Activity: Activity as tolerated    Diet: Regular Diet    Wound Care: None needed    Follow-up:    The patient should follow-up with PCP/neurology in 1-2 weeks in regards to recent hospitalization. Patient to arrange.        Discharge time > 35 mins   Chris Mack NP  9/14/2018, 9:10 AM

## 2018-09-15 LAB
ALBUMIN SERPL-MCNC: 2.4 G/DL (ref 3.4–5)
ALBUMIN/GLOB SERPL: 1 {RATIO} (ref 0.8–1.7)
ALP SERPL-CCNC: 71 U/L (ref 45–117)
ALT SERPL-CCNC: 26 U/L (ref 13–56)
AMMONIA PLAS-SCNC: 32 UMOL/L (ref 11–32)
ANION GAP SERPL CALC-SCNC: 7 MMOL/L (ref 3–18)
ANION GAP SERPL CALC-SCNC: 7 MMOL/L (ref 3–18)
AST SERPL-CCNC: 34 U/L (ref 15–37)
BASOPHILS # BLD: 0 K/UL (ref 0–0.1)
BASOPHILS NFR BLD: 0 % (ref 0–2)
BILIRUB SERPL-MCNC: 0.2 MG/DL (ref 0.2–1)
BUN SERPL-MCNC: 6 MG/DL (ref 7–18)
BUN SERPL-MCNC: 7 MG/DL (ref 7–18)
BUN/CREAT SERPL: 11 (ref 12–20)
BUN/CREAT SERPL: 9 (ref 12–20)
CALCIUM SERPL-MCNC: 7.3 MG/DL (ref 8.5–10.1)
CALCIUM SERPL-MCNC: 7.5 MG/DL (ref 8.5–10.1)
CHLORIDE SERPL-SCNC: 103 MMOL/L (ref 100–108)
CHLORIDE SERPL-SCNC: 104 MMOL/L (ref 100–108)
CO2 SERPL-SCNC: 29 MMOL/L (ref 21–32)
CO2 SERPL-SCNC: 29 MMOL/L (ref 21–32)
CREAT SERPL-MCNC: 0.66 MG/DL (ref 0.6–1.3)
CREAT SERPL-MCNC: 0.66 MG/DL (ref 0.6–1.3)
DIFFERENTIAL METHOD BLD: ABNORMAL
EOSINOPHIL # BLD: 0.4 K/UL (ref 0–0.4)
EOSINOPHIL NFR BLD: 4 % (ref 0–5)
ERYTHROCYTE [DISTWIDTH] IN BLOOD BY AUTOMATED COUNT: 13.9 % (ref 11.6–14.5)
EST. AVERAGE GLUCOSE BLD GHB EST-MCNC: 108 MG/DL
GLOBULIN SER CALC-MCNC: 2.4 G/DL (ref 2–4)
GLUCOSE BLD STRIP.AUTO-MCNC: 127 MG/DL (ref 70–110)
GLUCOSE SERPL-MCNC: 325 MG/DL (ref 74–99)
GLUCOSE SERPL-MCNC: 358 MG/DL (ref 74–99)
HBA1C MFR BLD: 5.4 % (ref 4.2–5.6)
HCT VFR BLD AUTO: 33.8 % (ref 35–45)
HGB BLD-MCNC: 11.2 G/DL (ref 12–16)
LYMPHOCYTES # BLD: 3.2 K/UL (ref 0.9–3.6)
LYMPHOCYTES NFR BLD: 31 % (ref 21–52)
MCH RBC QN AUTO: 29.7 PG (ref 24–34)
MCHC RBC AUTO-ENTMCNC: 33.1 G/DL (ref 31–37)
MCV RBC AUTO: 89.7 FL (ref 74–97)
MONOCYTES # BLD: 1 K/UL (ref 0.05–1.2)
MONOCYTES NFR BLD: 10 % (ref 3–10)
NEUTS SEG # BLD: 5.8 K/UL (ref 1.8–8)
NEUTS SEG NFR BLD: 55 % (ref 40–73)
PLATELET # BLD AUTO: 204 K/UL (ref 135–420)
PMV BLD AUTO: 10.9 FL (ref 9.2–11.8)
POTASSIUM SERPL-SCNC: 4.5 MMOL/L (ref 3.5–5.5)
POTASSIUM SERPL-SCNC: 4.8 MMOL/L (ref 3.5–5.5)
PROT SERPL-MCNC: 4.8 G/DL (ref 6.4–8.2)
RBC # BLD AUTO: 3.77 M/UL (ref 4.2–5.3)
SODIUM SERPL-SCNC: 139 MMOL/L (ref 136–145)
SODIUM SERPL-SCNC: 140 MMOL/L (ref 136–145)
VALPROATE SERPL-MCNC: 71 UG/ML (ref 50–100)
VALPROATE SERPL-MCNC: >300 UG/ML (ref 50–100)
WBC # BLD AUTO: 10.4 K/UL (ref 4.6–13.2)

## 2018-09-15 PROCEDURE — 74011000250 HC RX REV CODE- 250: Performed by: INTERNAL MEDICINE

## 2018-09-15 PROCEDURE — 74011000250 HC RX REV CODE- 250: Performed by: PSYCHIATRY & NEUROLOGY

## 2018-09-15 PROCEDURE — 74011250637 HC RX REV CODE- 250/637: Performed by: PSYCHIATRY & NEUROLOGY

## 2018-09-15 PROCEDURE — 82140 ASSAY OF AMMONIA: CPT | Performed by: PSYCHIATRY & NEUROLOGY

## 2018-09-15 PROCEDURE — 77030033269 HC SLV COMPR SCD KNE2 CARD -B

## 2018-09-15 PROCEDURE — 97530 THERAPEUTIC ACTIVITIES: CPT

## 2018-09-15 PROCEDURE — 74011250636 HC RX REV CODE- 250/636: Performed by: INTERNAL MEDICINE

## 2018-09-15 PROCEDURE — 83036 HEMOGLOBIN GLYCOSYLATED A1C: CPT | Performed by: HOSPITALIST

## 2018-09-15 PROCEDURE — 97165 OT EVAL LOW COMPLEX 30 MIN: CPT

## 2018-09-15 PROCEDURE — 74011250637 HC RX REV CODE- 250/637: Performed by: HOSPITALIST

## 2018-09-15 PROCEDURE — 97116 GAIT TRAINING THERAPY: CPT

## 2018-09-15 PROCEDURE — 65660000000 HC RM CCU STEPDOWN

## 2018-09-15 PROCEDURE — 74011000258 HC RX REV CODE- 258: Performed by: PSYCHIATRY & NEUROLOGY

## 2018-09-15 PROCEDURE — C9113 INJ PANTOPRAZOLE SODIUM, VIA: HCPCS | Performed by: INTERNAL MEDICINE

## 2018-09-15 PROCEDURE — 74011250636 HC RX REV CODE- 250/636: Performed by: NURSE PRACTITIONER

## 2018-09-15 PROCEDURE — 74011250637 HC RX REV CODE- 250/637: Performed by: INTERNAL MEDICINE

## 2018-09-15 PROCEDURE — 74011250636 HC RX REV CODE- 250/636: Performed by: PSYCHIATRY & NEUROLOGY

## 2018-09-15 PROCEDURE — 82962 GLUCOSE BLOOD TEST: CPT

## 2018-09-15 PROCEDURE — 94760 N-INVAS EAR/PLS OXIMETRY 1: CPT

## 2018-09-15 PROCEDURE — 36415 COLL VENOUS BLD VENIPUNCTURE: CPT | Performed by: NURSE PRACTITIONER

## 2018-09-15 PROCEDURE — 80164 ASSAY DIPROPYLACETIC ACD TOT: CPT | Performed by: PSYCHIATRY & NEUROLOGY

## 2018-09-15 PROCEDURE — 80053 COMPREHEN METABOLIC PANEL: CPT | Performed by: NURSE PRACTITIONER

## 2018-09-15 PROCEDURE — 74011000258 HC RX REV CODE- 258: Performed by: INTERNAL MEDICINE

## 2018-09-15 PROCEDURE — 85025 COMPLETE CBC W/AUTO DIFF WBC: CPT | Performed by: PSYCHIATRY & NEUROLOGY

## 2018-09-15 PROCEDURE — 80164 ASSAY DIPROPYLACETIC ACD TOT: CPT | Performed by: HOSPITALIST

## 2018-09-15 PROCEDURE — 94640 AIRWAY INHALATION TREATMENT: CPT

## 2018-09-15 PROCEDURE — C9254 INJECTION, LACOSAMIDE: HCPCS | Performed by: PSYCHIATRY & NEUROLOGY

## 2018-09-15 RX ORDER — PANTOPRAZOLE SODIUM 40 MG/1
40 TABLET, DELAYED RELEASE ORAL
Status: DISCONTINUED | OUTPATIENT
Start: 2018-09-15 | End: 2018-09-18 | Stop reason: HOSPADM

## 2018-09-15 RX ORDER — DEXTROSE MONOHYDRATE 25 G/50ML
25-50 INJECTION, SOLUTION INTRAVENOUS AS NEEDED
Status: DISCONTINUED | OUTPATIENT
Start: 2018-09-15 | End: 2018-09-18 | Stop reason: HOSPADM

## 2018-09-15 RX ORDER — INSULIN LISPRO 100 [IU]/ML
INJECTION, SOLUTION INTRAVENOUS; SUBCUTANEOUS
Status: DISCONTINUED | OUTPATIENT
Start: 2018-09-15 | End: 2018-09-17

## 2018-09-15 RX ORDER — MAGNESIUM SULFATE 100 %
4 CRYSTALS MISCELLANEOUS AS NEEDED
Status: DISCONTINUED | OUTPATIENT
Start: 2018-09-15 | End: 2018-09-18 | Stop reason: HOSPADM

## 2018-09-15 RX ADMIN — AMLODIPINE BESYLATE 10 MG: 10 TABLET ORAL at 08:27

## 2018-09-15 RX ADMIN — SODIUM CHLORIDE 750 MG: 900 INJECTION, SOLUTION INTRAVENOUS at 22:52

## 2018-09-15 RX ADMIN — PANTOPRAZOLE SODIUM 40 MG: 40 TABLET, DELAYED RELEASE ORAL at 16:30

## 2018-09-15 RX ADMIN — HYDROCODONE BITARTRATE AND ACETAMINOPHEN 1 TABLET: 5; 325 TABLET ORAL at 08:23

## 2018-09-15 RX ADMIN — DEXTROSE MONOHYDRATE, SODIUM CHLORIDE, AND POTASSIUM CHLORIDE 100 ML/HR: 50; 4.5; 1.49 INJECTION, SOLUTION INTRAVENOUS at 14:40

## 2018-09-15 RX ADMIN — ARFORMOTEROL TARTRATE 15 MCG: 15 SOLUTION RESPIRATORY (INHALATION) at 19:01

## 2018-09-15 RX ADMIN — Medication 10 ML: at 14:00

## 2018-09-15 RX ADMIN — SODIUM CHLORIDE 750 MG: 900 INJECTION, SOLUTION INTRAVENOUS at 16:53

## 2018-09-15 RX ADMIN — Medication 10 ML: at 22:00

## 2018-09-15 RX ADMIN — CEFTRIAXONE 1 G: 1 INJECTION, POWDER, FOR SOLUTION INTRAMUSCULAR; INTRAVENOUS at 12:51

## 2018-09-15 RX ADMIN — SODIUM CHLORIDE 100 MG: 900 INJECTION, SOLUTION INTRAVENOUS at 08:30

## 2018-09-15 RX ADMIN — HYDROCHLOROTHIAZIDE 50 MG: 25 TABLET ORAL at 08:26

## 2018-09-15 RX ADMIN — SODIUM CHLORIDE 750 MG: 900 INJECTION, SOLUTION INTRAVENOUS at 05:55

## 2018-09-15 RX ADMIN — Medication 30 ML: at 14:00

## 2018-09-15 RX ADMIN — BUDESONIDE 500 MCG: 0.5 INHALANT RESPIRATORY (INHALATION) at 09:17

## 2018-09-15 RX ADMIN — ARFORMOTEROL TARTRATE 15 MCG: 15 SOLUTION RESPIRATORY (INHALATION) at 09:18

## 2018-09-15 RX ADMIN — Medication 10 ML: at 05:55

## 2018-09-15 RX ADMIN — SODIUM CHLORIDE 40 MG: 9 INJECTION, SOLUTION INTRAMUSCULAR; INTRAVENOUS; SUBCUTANEOUS at 08:28

## 2018-09-15 RX ADMIN — Medication 10 ML: at 17:00

## 2018-09-15 RX ADMIN — BUDESONIDE 500 MCG: 0.5 INHALANT RESPIRATORY (INHALATION) at 19:01

## 2018-09-15 RX ADMIN — SODIUM CHLORIDE 100 MG: 900 INJECTION, SOLUTION INTRAVENOUS at 20:58

## 2018-09-15 RX ADMIN — DEXTROSE MONOHYDRATE, SODIUM CHLORIDE, AND POTASSIUM CHLORIDE 100 ML/HR: 50; 4.5; 1.49 INJECTION, SOLUTION INTRAVENOUS at 04:29

## 2018-09-15 NOTE — PROGRESS NOTES
[address] [place], [place] [address]ist Division Inpatient Daily Progress Note Patient: [place] Monique MRN: [id number]  [facility]: [id number] YOB: 1960  Age: 60 y.o. Sex: female DOA: 9/9/2018 LOS:  LOS: 6 days Chief Complaint:  H/A, muscle spasms Interval History: PMHx of COPD, anxiety, HTN, GERD; presented to ED with c/o severe H/A and muscle spasms. Pt was stable in ED with teleneuro consult with plans for observation and MRI. Pt started to have actively seizure w/ tonic-clonic activity and eye deviation to the left. 8mg of ativan given, but refractory. Keppra load refractory. Depakote load of 1500mg; 1500 mg PB. Neurology consulted. LP completed-CSF negative tubes 1&4. CT head negative. History of isolated WBCs. Recently c/o left sided numbness per pt's daughter-had been prescribed lyrica and flexeril. Takes xanax nightly. No h/o seizure. SBP elevated in ED. MRI c/w small area of subarachnoid hemorrhage on cortical surface right frontal lobe plus patchy brain edema, possible 2/2 malignant hypertension. UDS + benzo, thc. Fungal prep negative. Cryptococcal antigen negative. Tube 1: 0 WBCs and 21 RBCs. Tube 4: 0 WBCs and 111 RBCs. Protein 40, glucose 71.  
9/10/18: EEG completed-results pending. Family updated. SBP < 140-prn increased; added cozaar. Await neurology recommendations. Need to address feeding-TF? -will defer ICU. Start phenobarbital wean and check levels. Acyclovir stopped 2/2 CSF WBC count 0, CSF glucose and protein levels normal.  
9/11/18: EEG completed-per neurology's reading-Abnormal recording revealing the presence of moderate slowing of the background activity consistent with encephalopathy. This is a nonspecific finding and may be due to toxic, metabolic or inflammatory causes. No epileptiform activity was noted. (per neuro reading).  Following commands today; able to verbalize, open eyes to name. Restless-sitter ordered. Pending CSF studies paraneoplastic profile, NMDA receptor antibodies, HSV1-2 PCR. WBCs w/ slight improvement. Replace K-lytes protocol added. Phenobarbital level check in. SBP < 140 (currently ok). Urinary retention-straight cath X2, bladder scan prn, place ken if needed (but would overall try to avoid w/ elevated white count and unclear etiology?). Will need PT/OT/Speech when improved for discharge planning. Assess feeding-has NG tube. Echo ordered, but not yet completed. Continue to follow neuro status-slowly improving. 9/12/18: Patient following commands, but still restless and states she is \"falling out of bed\". Speech still garbled and hard to understand. Wbc's slight improvement; afebrile. Lytes off; replace per protocol. K continues low, will add K to fluid as well. Keep SBP < 140. Blood cultures NGTD. HSV 1/2 negative. Other serologies pending. Will await neurology recommendations. PB d/c'd yesterday. Ativan d/c'd but then given for seizure like activity per RN staff during night. 9/13/18: Transfer out of ICU. Remove ken; voiding trial. NG tube out. Swallow eval-clear liquid diet-advanced as tolerated. MRI today pending. Sitter d/c'd. K improved. SBP ok. On RA. WBCs continue to improve. Afebrile. GI consulted for NG tube w/ red aspirate. 9/14/18: Protonix to oral. Seizure meds still IV-can switch to oral whenever neuro ok'd. MRI retry today. Still c/o H/A. NMDA receptor antibody pending-defer neuro. VSS. Will await MRI results-hopefully can complete today w/ pt compliance. Will need to know neurology's recommendations on discharge medications for seizure medications outpatient and follow-up. 9/15-  Plan dc Monday to snf,  MRI with some improvement no new acute findings Subjective:  
  
Alert, oriented Mental status improving Objective:  
  
Visit Vitals  /75 (BP 1 Location: Left arm, BP Patient Position: At rest)  Pulse 94  Temp 98.1 °F (36.7 °C)  Resp 16  
 Ht 5' 5\" (1.651 m)  Wt 104 kg (229 lb 4.5 oz)  SpO2 92%  BMI 38.15 kg/m2 Physical Exam: 
General appearance: alert, cooperative, no distress Lungs: clear to auscultation bilaterally Heart: regular rate and rhythm, S1, S2 normal 
Abdomen: soft, non tender, non distended. Normoactive bowel sounds. Extremities: extremities normal, atraumatic, no cyanosis or edema Skin: Skin color, texture, turgor normal.  
Neurologic: PERRL 2mm b/l, follows commands x4, AOX4 Intake and Output: 
Current Shift:  09/15 0701 - 09/15 1900 In: 480 [P.O.:480] Out: - Last three shifts:  09/13 1901 - 09/15 0700 In: 2800 [P.O.:1000; I.V.:1800] Out: 3126 [[id number]] Recent Results (from the past 24 hour(s)) METABOLIC PANEL, BASIC Collection Time: 09/15/18  6:28 AM  
Result Value Ref Range Sodium 139 136 - 145 mmol/L Potassium 4.5 3.5 - 5.5 mmol/L Chloride 103 100 - 108 mmol/L  
 CO2 29 21 - 32 mmol/L Anion gap 7 3.0 - 18 mmol/L Glucose 325 (H) 74 - 99 mg/dL BUN 6 (L) 7.0 - 18 MG/DL Creatinine 0.66 0.6 - 1.3 MG/DL  
 BUN/Creatinine ratio 9 (L) 12 - 20 GFR est AA >60 >60 ml/min/1.73m2 GFR est non-AA >60 >60 ml/min/1.73m2 Calcium 7.5 (L) 8.5 - 10.1 MG/DL  
VALPROIC ACID Collection Time: 09/15/18  6:28 AM  
Result Value Ref Range Valproic acid >300 (HH) 50 - 100 ug/ml METABOLIC PANEL, COMPREHENSIVE Collection Time: 09/15/18  6:28 AM  
Result Value Ref Range Sodium 140 136 - 145 mmol/L Potassium 4.8 3.5 - 5.5 mmol/L Chloride 104 100 - 108 mmol/L  
 CO2 29 21 - 32 mmol/L Anion gap 7 3.0 - 18 mmol/L Glucose 358 (H) 74 - 99 mg/dL BUN 7 7.0 - 18 MG/DL Creatinine 0.66 0.6 - 1.3 MG/DL  
 BUN/Creatinine ratio 11 (L) 12 - 20 GFR est AA >60 >60 ml/min/1.73m2 GFR est non-AA >60 >60 ml/min/1.73m2 Calcium 7.3 (L) 8.5 - 10.1 MG/DL  Bilirubin, total 0.2 0.2 - 1.0 MG/DL  
 ALT (SGPT) 26 13 - 56 U/L  
 AST (SGOT) 34 15 - 37 U/L Alk. phosphatase 71 45 - 117 U/L Protein, total 4.8 (L) 6.4 - 8.2 g/dL Albumin 2.4 (L) 3.4 - 5.0 g/dL Globulin 2.4 2.0 - 4.0 g/dL A-G Ratio 1.0 0.8 - 1.7    
CBC WITH AUTOMATED DIFF Collection Time: 09/15/18  6:28 AM  
Result Value Ref Range WBC 10.4 4.6 - 13.2 K/uL  
 RBC 3.77 (L) 4.20 - 5.30 M/uL  
 HGB 11.2 (L) 12.0 - 16.0 g/dL HCT 33.8 (L) 35.0 - 45.0 % MCV 89.7 74.0 - 97.0 FL  
 MCH 29.7 24.0 - 34.0 PG  
 MCHC 33.1 31.0 - 37.0 g/dL  
 RDW 13.9 11.6 - 14.5 % PLATELET 663 031 - 878 K/uL MPV 10.9 9.2 - 11.8 FL  
 NEUTROPHILS 55 40 - 73 % LYMPHOCYTES 31 21 - 52 % MONOCYTES 10 3 - 10 % EOSINOPHILS 4 0 - 5 % BASOPHILS 0 0 - 2 %  
 ABS. NEUTROPHILS 5.8 1.8 - 8.0 K/UL  
 ABS. LYMPHOCYTES 3.2 0.9 - 3.6 K/UL  
 ABS. MONOCYTES 1.0 0.05 - 1.2 K/UL  
 ABS. EOSINOPHILS 0.4 0.0 - 0.4 K/UL  
 ABS. BASOPHILS 0.0 0.0 - 0.1 K/UL  
 DF AUTOMATED AMMONIA Collection Time: 09/15/18  6:28 AM  
Result Value Ref Range Ammonia 32 11 - 32 UMOL/L Lab Results Component Value Date/Time Glucose 325 (H) 09/15/2018 06:28 AM  
 Glucose 358 (H) 09/15/2018 06:28 AM  
 Glucose 86 09/13/2018 05:09 AM  
 Glucose 77 09/12/2018 04:00 AM  
 Glucose 91 09/11/2018 03:45 AM  
 EXAM: CTA HEAD AND NECK 
  INDICATION: Subarachnoid hemorrhage, possible aneurysm 
  
COMPARISON: MR brain 9/9/2018 
  
TECHNIQUE:  Multiple axial CT images of the neck were obtained extending from 
the level of the aortic arch to the skull base after the administration of the 
IV contrast utilizing a CTA protocol. Maximum intensity projection 
reconstructions were performed in multiple planes. Multiple axial CT images of the head were obtained extending from below the 
level of the skull base to the vertex after the administration of the IV 
contrast utilizing a CTA protocol. Maximum intensity projection reconstructions were performed in three planes. Additional 3 D reconstructions were performed 
at a separate workstation. One or more dose reduction techniques were used on this CT: automated exposure 
control, adjustment of the mAs and/or kVp according to patient's size, and 
iterative reconstruction techniques. The specific techniques utilized on this CT 
exam have been documented in the patient's electronic medical record. 
  
FINDINGS: 
  
Motion artifact is present which limits evaluation. 
  
CTA NECK 
  
Mild amount of atherosclerotic calcifications are present along the aortic arch. No high-grade proximal great vessel origin stenosis is seen. Mild narrowing of 
the proximal right subclavian artery origin is noted. 
  
The left common carotid artery is mildly irregular. The left carotid 
bifurcation is moderately irregular. Estimated minimal luminal diameter proximal 
left ICA 2.7 mm. Estimated luminal diameter of normal left ICA cervical segment 
is 5.1 mm. Estimated degree of stenosis of the left ICA based upon NASCET 
criteria is 48%. Remainder of left ICA cervical segment is minimally irregular. 
  
The right common carotid artery is slightly irregular. The right carotid 
bifurcation is mildly irregular. Estimated minimal luminal diameter proximal 
right ICA 3.7 mm. Estimated luminal diameter of normal right ICA cervical 
segment is 4.7 mm. Estimated degree of stenosis of the right ICA based upon NASCET criteria is 22%. Remainder of right ICA cervical segment is a normal 
variant vascular loop without focal stenosis. 
  
Left vertebral artery is mildly dominant. No focal vertebral artery stenosis is 
seen. 
  
Degenerative changes are seen in the spine. Nasogastric tube is present 
extending inferiorly out of the field-of-view in the esophagus. 
  
Air-fluid level is present in the right maxillary antrum. Additional right 
frontal sinus air-fluid level is present.  Mild mucoperiosteal thickening seen in 
 the ethmoidal air cells. Mastoid air cells are unremarkable. 
  
CTA HEAD 
  
There is no focal high-grade stenosis within the intracranial arteries. Mild 
atherosclerotic irregularity is seen in the bilateral carotid siphons without 
high grade stenosis. The carotid terminus is unremarkable.  
  
No high-grade ANDREA stenosis is seen. Very small anterior to indicating artery is 
suggested. Slight irregularity seen in the bilateral MCA vessels mostly along 
distal branches. 
  
Left vertebral artery is mildly dominant intracranially. PICA origins are 
unremarkable. Mild tortuosity is present involving the basilar artery without 
focal stenosis. Mild irregularity is seen in bilateral PCA branches distally. The dural venous sinuses are patent. Sharply defined rounded hypodensities are 
present distally in the transverse sinuses bilaterally compatible with 
pacchionian arachnoid granulation. No abnormal vessels are seen towards the 
vertex on the right in location of previously suggested small volume 
subarachnoid hemorrhage. 
  
  
_________________________ 
  
IMPRESSION IMPRESSION: 
  
1. No intracranial aneurysm or vascular malformation is identified to suggest 
etiology of previously seen small volume subarachnoid hemorrhage. 
  
2.  Mild to moderate irregularity involving bilateral proximal ICA, estimated 48% stenosis on the left and 22% stenosis on the right, based on NASCET 
criteria.  
  
3. No high-grade vertebral artery stenosis. 
  
4. No definite high-grade intracranial stenosis. Mild multifocal irregularity 
and narrowing involving both anterior and posterior circulation, greatest 
involving the bilateral PCA. This finding is nonspecific but may represent 
intracranial atherosclerotic disease. Difficult to exclude other vasculitic 
processes. 
  
5. No evidence of dural venous sinus thrombosis. Assessment/Plan:  
 
Patient Active Problem List  
Diagnosis Code  DJD (degenerative joint disease) M19.90  
 HTN (hypertension) I10  
 COPD (chronic obstructive pulmonary disease) ([facility] [address] J44.9  Status post laparoscopic cholecystectomy Z90.49  Status epilepticus ([facility] [address] G40.901  Anxiety F41.9  GERD (gastroesophageal reflux disease) K21.9  Acute kidney failure (HCC) N17.9  Erythrocytosis D75.1  Lymphocytosis D72.820  
 Positive urine drug screen R82.5  
 SAH (subarachnoid hemorrhage) (HCC) I60.9  PRES (posterior reversible encephalopathy syndrome) I67.83  
 
 
A/P: 
 
Seizure Activity/acute encephalopathy  
-2/2 PRES and small SAH  
-neuro following-appreciate  
-Depakote and vimpat per neuro 
-seizure precautions- 
-CT head negative -EEG completed 9/10-IMPRESSION:  Abnormal recording revealing the presence of moderate slowing of the background activity consistent with encephalopathy. This is a nonspecific finding and may be due to toxic, metabolic or inflammatory causes. No epileptiform activity was noted. (per neuro reading) -MRI c/w small area of subarachnoid hemorrhage on cortical surface right frontal lobe plus patchy brain edema, possible 2/2 malignant hypertension. -LP completed-CSF negative Subarachnoid bleed  
-neurology following -appreciate  
-MRI initial showed small area of subarachnoid hemorrhage on cortical surface right frontal lobe plus patchy brain edema, possible 2/2 malignant hypertension. -SBP < 140  (currently BP ok) -CTA negative for aneurysm  
-MRI repeat (9/14) poor quality but looks like some improvement in previous signs of PRES and SAH. PRES  
-keep SBP < 140  
-norvasc 10 mg, HCTZ 
 
COPD 
-sat goal > 90% (titrate down O2 as tolerated) -on RA  
-PCCM following 
-pulmicort/brovana UTI 
-rocephin - completed 3 days Urinary retention 
-bladder scan prn 
 
GI bleed 
-protonix oral 
-GI consulted-appreciate 
-no active s/s GI bleed GERD  
-protonix Plan DC Monday to SNF

## 2018-09-15 NOTE — PROGRESS NOTES
Problem: Self Care Deficits Care Plan (Adult) Goal: *Acute Goals and Plan of Care (Insert Text) Occupational Therapy Goals Initiated 9/15/2018 within 7 day(s). 1.  Patient will perform grooming with supervision/set-up standing at sink for 5 minutes or more. 2.  Patient will perform upper body dressing with independence. 3.  Patient will perform lower body dressing with independence. 4.  Patient will perform toilet transfers with supervision/set-up. 5.  Patient will perform all aspects of toileting with supervision/set-up. 6.  Patient will participate in upper extremity therapeutic exercise/activities with independence for 10 minutes. 7.  Patient will utilize energy conservation techniques during functional activities with verbal, visual and tactile cues. Occupational Therapy EVALUATION Patient: Judge Lima (85 y.o. female) Date: 9/15/2018 Primary Diagnosis: Tachycardia Status epilepticus ([facility] [address] Precautions:   Fall, Seizure PLOF: independent with ADLs and transfers PTA. ASSESSMENT : 
Based on the objective data described below, the patient presents with decreased strength, endurance and balance. Pt presented long sitting in bed at the time of assessment. Agreed to participate with therapy. Pt participated with bed mobility, STS transfers and side stepping at EOB to reach the [facility]. Pt demo decreased balance and unsteady gait. Pt reports of visual hallucinations, seeing plastic wires all around the room and reaching for the wires to clear the path. Pt returned to bed at the end of session in NAD and educated on safety and fall prevention and that to call for assistance. Pt verbalized understanding. Recommend short term rehab versus [facility] therapy for safety and fall prevention as pt lives alone at home. Patient will benefit from skilled intervention to address the above impairments. Patients rehabilitation potential is considered to be Good Factors which may influence rehabilitation potential include:  
[]             None noted []             Mental ability/status [x]             Medical condition []             Home/family situation and support systems [x]             Safety awareness []             Pain tolerance/management 
[]             Other:  
  
Recommendations for nursing: 
Written on communication board:  
Verbally communicated to: PLAN : 
Recommendations and Planned Interventions: 
[x]               Self Care Training                  [x]        Therapeutic Activities [x]               Functional Mobility Training    []        Cognitive Retraining 
[x]               Therapeutic Exercises           []        Endurance Activities [x]               Balance Training                   []        Neuromuscular Re-Education []               Visual/Perceptual Training     [x]   Home Safety Training 
[x]               Patient Education                 [x]        Family Training/Education []               Other (comment): Frequency/Duration: Patient will be followed by occupational therapy 1-2 times per day/4-7 days per week to address goals. Discharge Recommendations: Home Health and [facility] Equipment Recommendations for Discharge: rolling walker and TBD SUBJECTIVE:  
Patient stated  I am not sure what day it is. I think I have lost a week of my life due to this condition.  OBJECTIVE DATA SUMMARY:  
 
Past Medical History:  
Diagnosis Date  Abdominal pain  Anxiety  COPD (chronic obstructive pulmonary disease) (HCC)  DJD (degenerative joint disease) of cervical spine  Elevated cholesterol  GERD (gastroesophageal reflux disease) H/O--NOT ON MEDICATION AT PRESENT  
 Hypertension  Nausea and vomiting Past Surgical History:  
Procedure Laterality Date  EXCISION TUMOR SOFT TISSUE FOOT/TOE SUBQ <1.5CM  2011  
 2 mccartney neuroma right foot  HX CARPAL TUNNEL RELEASE  2002  HX CHOLECYSTECTOMY  10/14/2013  HX HYSTERECTOMY  1994  
 vaginal (ovaries still in) [address]  ME REMOVAL 1ST/CERVICAL RIB  1984  
 right side 1st rib  REMOVAL OF RIB(S)  1976  
 left side 1st rib Barriers to Learning/Limitations: yes;  altered mental status (i.e.Sedation, Confusion) Compensate with: visual, verbal, tactile, kinesthetic cues/model GCODES:  Self Care  Current  CK= 40-59%  Goal  CI= 1-19%. The severity rating is based on the Other participation with ADLs and functional transfers. Eval Complexity: History: LOW Complexity : Brief history review ; Examination: LOW Complexity : 1-3 performance deficits relating to physical, cognitive , or psychosocial skils that result in activity limitations and / or participation restrictions ; Decision Making:MEDIUM Complexity : Patient may present with comorbidities that affect occupational performnce. Miniml to moderate modification of tasks or assistance (eg, physical or verbal ) with assesment(s) is necessary to enable patient to complete evaluation Prior Level of Function/Home Situation:  
Home Situation Home Environment: Private residence # Steps to Enter: 4 Rails to Enter: Yes Hand Rails : Bilateral 
One/Two Story Residence: One story Living Alone: Yes Support Systems: Child(na), Family member(s) Patient Expects to be Discharged to[de-identified] Private residence Current DME Used/Available at Home: None Tub or Shower Type: Tub/Shower combination 
[x]  Right hand dominant   []  Left hand dominantCognitive/Behavioral Status: 
Neurologic State: Alert Orientation Level: Oriented X4 Cognition: Appropriate for age attention/concentration; Follows commands Safety/Judgement: Fall prevention Skin: intact Edema: none Vision/Perceptual:   
Tracking: Able to track stimulus in all quadrants w/o difficulty Coordination: 
Coordination: Within functional limits Fine Motor Skills-Upper: Left Intact; Right Intact Gross Motor Skills-Upper: Left Intact; Right Intact Balance: 
Sitting: Impaired Sitting - Static: Good (unsupported) Sitting - Dynamic: Fair (occasional) Standing: Impaired Standing - Static: Fair Standing - Dynamic : Poor (+)Strength: 
Strength: Generally decreased, functional 
Tone & Sensation: 
Sensation: Intact Range of Motion: 
AROM: Generally decreased, functional 
Functional Mobility and Transfers for ADLs: 
Bed Mobility: 
Rolling: Minimum assistance Supine to Sit: Minimum assistance Sit to Supine: Minimum assistance Transfers: 
Sit to Stand: Contact guard assistance;Minimum assistance ADL Assessment: 
Feeding: Independent Oral Facial Hygiene/Grooming: Setup Upper Body Dressing: Setup Lower Body Dressing: Minimum assistance Toileting: Minimum assistance ADL Intervention: Lower Body Dressing Assistance Dressing Assistance: Minimum assistance Socks: Minimum assistance Leg Crossed Method Used: Yes Position Performed: Seated edge of bed Cues: Jesús Sport 
 
Cognitive Retraining Safety/Judgement: Fall prevention Therapeutic Exercise: 
 
Pain: 
Pre treatment pain level:   
Post treatment pain level:  
Pain Scale 1: Numeric (0 - 10) Pain Intensity 1: 0 Pain Location 1: Head 
Pain Orientation 1: Anterior Pain Description 1: Aching Pain Intervention(s) 1: Medication (see MAR) Activity Tolerance:  
Fair Please refer to the flowsheet for vital signs taken during this treatment. After treatment:  
[] Patient left in no apparent distress sitting up in chair 
[x] Patient left in no apparent distress in bed 
[x] Call bell left within reach 
[] Nursing notified 
[x] Caregiver present 
[] Bed alarm activated COMMUNICATION/EDUCATION:  
[x] Home safety education was provided and the patient/caregiver indicated understanding. [x] Patient/family have participated as able in goal setting and plan of care. [x] Patient/family agree to work toward stated goals and plan of care. [] Patient understands intent and goals of therapy, but is neutral about his/her participation. [] Patient is unable to participate in goal setting and plan of care. Thank you for this referral. 
Susi Cabot, OTR/L Time Calculation: 25 mins

## 2018-09-15 NOTE — PROGRESS NOTES
Assume care of patient lying in bed from [facility], [address] [facility] with family members at bedside. Alert and oriented X 4. Denies pain or discomfort at present. Bed locked in lowest position. Call light within reach and understand to use for assistance and needs. 2100 Patient up to bedside commode with assistance, noted unsteady gait upon transfer back to bed. Extra large form brownish stool noted. Complain of massive headache after returning to bed. 2110 Norco 1 tab administered orally for complaint of headache. 2210 Patient resting quietly at present with eyes closed. 09/15/2018  
 
0000 Patient resting quietly without complaint of pain or discomfort at present. 0400 No complaint voiced of headache or pain. 0745 Bedside and Verbal shift change report given to Kavon Perez RN (oncoming nurse) by Deshawn Calderon RN (offgoing nurse). Report given with SBAR, Kardex, Intake/Output, MAR and Recent Results.

## 2018-09-15 NOTE — PROGRESS NOTES
Problem: Falls - Risk of 
Goal: *Absence of Falls Document Catherene Bunting Fall Risk and appropriate interventions in the flowsheet. Outcome: Progressing Towards Goal 
Fall Risk Interventions: 
  
 
Mentation Interventions: Bed/chair exit alarm, Door open when patient unattended, Family/sitter at bedside Medication Interventions: Bed/chair exit alarm, Patient to call before getting OOB Elimination Interventions: Patient to call for help with toileting needs, Call light in reach Problem: Pressure Injury - Risk of 
Goal: *Prevention of pressure injury Document Glenn Scale and appropriate interventions in the flowsheet. Outcome: Progressing Towards Goal 
Pressure Injury Interventions: 
Sensory Interventions: Monitor skin under medical devices, Keep linens dry and wrinkle-free, Minimize linen layers, Assess changes in LOC Moisture Interventions: Absorbent underpads, Maintain skin hydration (lotion/cream) Activity Interventions: Pressure redistribution bed/mattress(bed type) Mobility Interventions: HOB 30 degrees or less, PT/OT evaluation Nutrition Interventions: Document food/fluid/supplement intake Friction and Shear Interventions: HOB 30 degrees or less, Foam dressings/transparent film/skin sealants, Apply protective barrier, creams and emollients

## 2018-09-15 NOTE — PROGRESS NOTES
Problem: Falls - Risk of 
Goal: *Absence of Falls Document Eliezer Romero Fall Risk and appropriate interventions in the flowsheet. Outcome: Progressing Towards Goal 
Fall Risk Interventions: 
  
 
Mentation Interventions: Bed/chair exit alarm, Door open when patient unattended, Family/sitter at bedside Medication Interventions: Bed/chair exit alarm, Patient to call before getting OOB Elimination Interventions: Patient to call for help with toileting needs, Call light in reach Problem: Pressure Injury - Risk of 
Goal: *Prevention of pressure injury Document Glenn Scale and appropriate interventions in the flowsheet. Outcome: Progressing Towards Goal 
Pressure Injury Interventions: 
Sensory Interventions: Monitor skin under medical devices, Keep linens dry and wrinkle-free, Minimize linen layers, Assess changes in LOC Moisture Interventions: Absorbent underpads, Maintain skin hydration (lotion/cream) Activity Interventions: Pressure redistribution bed/mattress(bed type) Mobility Interventions: HOB 30 degrees or less, PT/OT evaluation Nutrition Interventions: Document food/fluid/supplement intake Friction and Shear Interventions: HOB 30 degrees or less, Foam dressings/transparent film/skin sealants, Apply protective barrier, creams and emollients

## 2018-09-16 LAB
GLUCOSE BLD STRIP.AUTO-MCNC: 105 MG/DL (ref 70–110)
GLUCOSE BLD STRIP.AUTO-MCNC: 85 MG/DL (ref 70–110)
GLUCOSE BLD STRIP.AUTO-MCNC: 99 MG/DL (ref 70–110)
GLUCOSE BLD STRIP.AUTO-MCNC: 99 MG/DL (ref 70–110)

## 2018-09-16 PROCEDURE — 74011000250 HC RX REV CODE- 250: Performed by: PSYCHIATRY & NEUROLOGY

## 2018-09-16 PROCEDURE — C9254 INJECTION, LACOSAMIDE: HCPCS | Performed by: PSYCHIATRY & NEUROLOGY

## 2018-09-16 PROCEDURE — 74011000258 HC RX REV CODE- 258: Performed by: PSYCHIATRY & NEUROLOGY

## 2018-09-16 PROCEDURE — 74011000250 HC RX REV CODE- 250: Performed by: INTERNAL MEDICINE

## 2018-09-16 PROCEDURE — 74011250637 HC RX REV CODE- 250/637: Performed by: HOSPITALIST

## 2018-09-16 PROCEDURE — 74011250636 HC RX REV CODE- 250/636: Performed by: PSYCHIATRY & NEUROLOGY

## 2018-09-16 PROCEDURE — 97110 THERAPEUTIC EXERCISES: CPT

## 2018-09-16 PROCEDURE — 94640 AIRWAY INHALATION TREATMENT: CPT

## 2018-09-16 PROCEDURE — 74011250637 HC RX REV CODE- 250/637: Performed by: INTERNAL MEDICINE

## 2018-09-16 PROCEDURE — 97530 THERAPEUTIC ACTIVITIES: CPT

## 2018-09-16 PROCEDURE — 74011250636 HC RX REV CODE- 250/636: Performed by: NURSE PRACTITIONER

## 2018-09-16 PROCEDURE — 74011250637 HC RX REV CODE- 250/637: Performed by: PSYCHIATRY & NEUROLOGY

## 2018-09-16 PROCEDURE — 82962 GLUCOSE BLOOD TEST: CPT

## 2018-09-16 PROCEDURE — 65660000000 HC RM CCU STEPDOWN

## 2018-09-16 RX ORDER — LACOSAMIDE 50 MG/1
100 TABLET ORAL 2 TIMES DAILY
Status: DISCONTINUED | OUTPATIENT
Start: 2018-09-16 | End: 2018-09-18 | Stop reason: HOSPADM

## 2018-09-16 RX ADMIN — AMLODIPINE BESYLATE 10 MG: 10 TABLET ORAL at 08:46

## 2018-09-16 RX ADMIN — SODIUM CHLORIDE 750 MG: 900 INJECTION, SOLUTION INTRAVENOUS at 14:30

## 2018-09-16 RX ADMIN — ARFORMOTEROL TARTRATE 15 MCG: 15 SOLUTION RESPIRATORY (INHALATION) at 21:07

## 2018-09-16 RX ADMIN — DIVALPROEX SODIUM 750 MG: 500 TABLET, DELAYED RELEASE ORAL at 22:36

## 2018-09-16 RX ADMIN — SODIUM CHLORIDE 750 MG: 900 INJECTION, SOLUTION INTRAVENOUS at 07:00

## 2018-09-16 RX ADMIN — HYDROCODONE BITARTRATE AND ACETAMINOPHEN 1 TABLET: 5; 325 TABLET ORAL at 00:50

## 2018-09-16 RX ADMIN — Medication 10 ML: at 06:00

## 2018-09-16 RX ADMIN — Medication 10 ML: at 06:37

## 2018-09-16 RX ADMIN — PANTOPRAZOLE SODIUM 40 MG: 40 TABLET, DELAYED RELEASE ORAL at 07:30

## 2018-09-16 RX ADMIN — HYDRALAZINE HYDROCHLORIDE 20 MG: 20 INJECTION INTRAMUSCULAR; INTRAVENOUS at 14:54

## 2018-09-16 RX ADMIN — Medication 10 ML: at 17:00

## 2018-09-16 RX ADMIN — DEXTROSE MONOHYDRATE, SODIUM CHLORIDE, AND POTASSIUM CHLORIDE 100 ML/HR: 50; 4.5; 1.49 INJECTION, SOLUTION INTRAVENOUS at 03:52

## 2018-09-16 RX ADMIN — LACOSAMIDE 100 MG: 50 TABLET, FILM COATED ORAL at 17:57

## 2018-09-16 RX ADMIN — PANTOPRAZOLE SODIUM 40 MG: 40 TABLET, DELAYED RELEASE ORAL at 17:02

## 2018-09-16 RX ADMIN — Medication 10 ML: at 22:00

## 2018-09-16 RX ADMIN — HYDROCHLOROTHIAZIDE 50 MG: 25 TABLET ORAL at 08:45

## 2018-09-16 RX ADMIN — SODIUM CHLORIDE 100 MG: 900 INJECTION, SOLUTION INTRAVENOUS at 09:50

## 2018-09-16 RX ADMIN — Medication 10 ML: at 14:00

## 2018-09-16 RX ADMIN — DIVALPROEX SODIUM 750 MG: 500 TABLET, DELAYED RELEASE ORAL at 17:58

## 2018-09-16 RX ADMIN — BUDESONIDE 500 MCG: 0.5 INHALANT RESPIRATORY (INHALATION) at 21:07

## 2018-09-16 NOTE — PROGRESS NOTES
Progress Note Patient: Robert Olivarez MRN: [id number]  SSN: [id number] YOB: 1960  Age: 60 y.o. Sex: female Admit Date: 9/9/2018 LOS: 7 days Subjective:  
 
 
Patient has been complaining of \"seeing strings\" since she has been alert enough to tell others about it. PT just walked patient. Per PT patient will need rehab but did fairly well with a walker. No seizure activity reported. Patient continues to have headaches; notably had a severe headache after a bowel movement yesterday, as she had prior to admission. Norco low dose relieves headaches. Patient has had headaches at other times, as well. Labs: 9/15 NH3 = 32. LFTs OK. Depakote level 71. Glucose suddenly running in the 300s on 9/15 yet HgbA1C was normal. (?)  
 
CSF: NMDA receptor antibody titer negative. Vitals:  - 164 mmHg last 48 hours. Objective:  
 
Vitals:  
 09/16/18 0217 09/16/18 4179 09/16/18 0952 09/16/18 1400 BP: 129/71 134/82 164/81 152/82 Pulse: 84 78 86 90 Resp: 18 16 16 16 Temp: 98.2 °F (36.8 °C) 97.8 °F (36.6 °C) 97.4 °F (36.3 °C) 97.8 °F (36.6 °C) SpO2: 94% 96% 95% 94% Weight:      
Height:      
  
 
Physical Exam: A and O x 3, up in chair; normal speech. CN II - XII intact. No focal weakness. Light touch intact all for extremities. Normal finger to nose testing. Lab/Data Review: 
Recent Results (from the past 48 hour(s)) METABOLIC PANEL, BASIC Collection Time: 09/15/18  6:28 AM  
Result Value Ref Range Sodium 139 136 - 145 mmol/L Potassium 4.5 3.5 - 5.5 mmol/L Chloride 103 100 - 108 mmol/L  
 CO2 29 21 - 32 mmol/L Anion gap 7 3.0 - 18 mmol/L Glucose 325 (H) 74 - 99 mg/dL BUN 6 (L) 7.0 - 18 MG/DL Creatinine 0.66 0.6 - 1.3 MG/DL  
 BUN/Creatinine ratio 9 (L) 12 - 20 GFR est AA >60 >60 ml/min/1.73m2 GFR est non-AA >60 >60 ml/min/1.73m2  Calcium 7.5 (L) 8.5 - 10.1 MG/DL  
VALPROIC ACID  
 Collection Time: 09/15/18  6:28 AM  
Result Value Ref Range Valproic acid >300 (HH) 50 - 100 ug/ml METABOLIC PANEL, COMPREHENSIVE Collection Time: 09/15/18  6:28 AM  
Result Value Ref Range Sodium 140 136 - 145 mmol/L Potassium 4.8 3.5 - 5.5 mmol/L Chloride 104 100 - 108 mmol/L  
 CO2 29 21 - 32 mmol/L Anion gap 7 3.0 - 18 mmol/L Glucose 358 (H) 74 - 99 mg/dL BUN 7 7.0 - 18 MG/DL Creatinine 0.66 0.6 - 1.3 MG/DL  
 BUN/Creatinine ratio 11 (L) 12 - 20 GFR est AA >60 >60 ml/min/1.73m2 GFR est non-AA >60 >60 ml/min/1.73m2 Calcium 7.3 (L) 8.5 - 10.1 MG/DL Bilirubin, total 0.2 0.2 - 1.0 MG/DL  
 ALT (SGPT) 26 13 - 56 U/L  
 AST (SGOT) 34 15 - 37 U/L Alk. phosphatase 71 45 - 117 U/L Protein, total 4.8 (L) 6.4 - 8.2 g/dL Albumin 2.4 (L) 3.4 - 5.0 g/dL Globulin 2.4 2.0 - 4.0 g/dL A-G Ratio 1.0 0.8 - 1.7    
CBC WITH AUTOMATED DIFF Collection Time: 09/15/18  6:28 AM  
Result Value Ref Range WBC 10.4 4.6 - 13.2 K/uL  
 RBC 3.77 (L) 4.20 - 5.30 M/uL  
 HGB 11.2 (L) 12.0 - 16.0 g/dL HCT 33.8 (L) 35.0 - 45.0 % MCV 89.7 74.0 - 97.0 FL  
 MCH 29.7 24.0 - 34.0 PG  
 MCHC 33.1 31.0 - 37.0 g/dL  
 RDW 13.9 11.6 - 14.5 % PLATELET 093 897 - 607 K/uL MPV 10.9 9.2 - 11.8 FL  
 NEUTROPHILS 55 40 - 73 % LYMPHOCYTES 31 21 - 52 % MONOCYTES 10 3 - 10 % EOSINOPHILS 4 0 - 5 % BASOPHILS 0 0 - 2 %  
 ABS. NEUTROPHILS 5.8 1.8 - 8.0 K/UL  
 ABS. LYMPHOCYTES 3.2 0.9 - 3.6 K/UL  
 ABS. MONOCYTES 1.0 0.05 - 1.2 K/UL  
 ABS. EOSINOPHILS 0.4 0.0 - 0.4 K/UL  
 ABS. BASOPHILS 0.0 0.0 - 0.1 K/UL  
 DF AUTOMATED AMMONIA Collection Time: 09/15/18  6:28 AM  
Result Value Ref Range Ammonia 32 11 - 32 UMOL/L  
HEMOGLOBIN A1C WITH EAG Collection Time: 09/15/18  6:28 AM  
Result Value Ref Range Hemoglobin A1c 5.4 4.2 - 5.6 % Est. average glucose 108 mg/dL VALPROIC ACID Collection Time: 09/15/18  1:33 PM  
Result Value Ref Range Valproic acid 71 50 - 100 ug/ml GLUCOSE, POC Collection Time: 09/15/18 10:27 PM  
Result Value Ref Range Glucose (POC) 127 (H) 70 - 110 mg/dL GLUCOSE, POC Collection Time: 09/16/18  8:16 AM  
Result Value Ref Range Glucose (POC) 85 70 - 110 mg/dL GLUCOSE, POC Collection Time: 09/16/18 11:40 AM  
Result Value Ref Range Glucose (POC) 99 70 - 110 mg/dL Assessment:  
 
Principal Problem: 
  Status epilepticus ([facility] (9/9/2018) Active Problems: 
  HTN (hypertension) (9/27/2013) COPD (chronic obstructive pulmonary disease) ([facility] [address] (9/27/2013) Anxiety (9/9/2018) GERD (gastroesophageal reflux disease) (9/9/2018) Overview: H/O--NOT ON MEDICATION AT PRESENT Acute kidney failure ([facility] [address] (9/9/2018) Erythrocytosis (9/9/2018) Lymphocytosis (9/9/2018) Positive urine drug screen (9/9/2018) Overview: THC 
 
  SAH (subarachnoid hemorrhage) ([facility] [address] (9/14/2018) PRES (posterior reversible encephalopathy syndrome) (9/14/2018) Plan: 1. PRES/reversible cerebral vasoconstriction syndrome (RCVS):  RCVS includes HAs and SAH such as the paitent suffered, so the more likely diagnosis. Recs:   
 
 - Continue amlodipine. Calcium channel blockers recommended for tx of RCVS. 
 
 - Do NOT restart Lexapro or other serotonergic medication, as this is a known trigger for RCVS. 
 
 - Continue Norco prn HAs, not tramadol as tramadol can trigger seizures. - Continue Vimpat 100 mg po bid and Depakote 750 mg po tid. Check Depakote levels on a weekly basis, monitor LFTS and NH3 occasionally on Depakote. - Patient will be discharged to rehab tomorrow. She should have a follow up visit with Dr. Lillian Heredia about a month after discharge from rehab. Office phone number is [phone].   
 
2.  Sudden hyperglycemia yest AM.  HGbA1C was normal.  Unclear why glucose suddenly shot up.   Will leave further eval to primary team.  Patient has been drinking juice, regular ginger ale. Advised that she drink water, etc., without sugar and discussed with nurse. Discussed above with patient and daughter at some length. Will sign off. Pls page if any questions. Signed By: Damian Suresh MD   
 September 16, 2018

## 2018-09-16 NOTE — PROGRESS NOTES
Problem: Falls - Risk of 
Goal: *Absence of Falls Document Manuel Breen Fall Risk and appropriate interventions in the flowsheet. Outcome: Progressing Towards Goal 
Fall Risk Interventions: 
  
 
Mentation Interventions: Bed/chair exit alarm Medication Interventions: Bed/chair exit alarm Elimination Interventions: Patient to call for help with toileting needs Problem: Pressure Injury - Risk of 
Goal: *Prevention of pressure injury Document Glenn Scale and appropriate interventions in the flowsheet. Outcome: Progressing Towards Goal 
Pressure Injury Interventions: 
Sensory Interventions: Monitor skin under medical devices Moisture Interventions: Absorbent underpads Activity Interventions: Pressure redistribution bed/mattress(bed type) Mobility Interventions: HOB 30 degrees or less Nutrition Interventions: Document food/fluid/supplement intake Friction and Shear Interventions: HOB 30 degrees or less

## 2018-09-16 NOTE — PROGRESS NOTES
Problem: Mobility Impaired (Adult and Pediatric) Goal: *Acute Goals and Plan of Care (Insert Text) Physical Therapy Goals Initiated 9/14/2018 and to be accomplished within 7 days. 1.  Patient will complete all bed mobility with supervision/set-up in order to prepare for EOB/OOB activity. 2.  Patient will perform sit <> stand with supervision/set-up in order to prepare for OOB/gait activity. 3.  Patient will perform bed to chair transfers with minimal assistance/contact guard assist in order to promote mobility and encourage seated activity to progress towards their prior level of function. 4.  Patient will ambulate 25 feet with minimal assistance/contact guard assist using LRAD with step through gait in order to prepare for safe negotiation of their environment. PHYSICAL THERAPY: Daily TREATMENT Note INPATIENT: [facility]: Hospital Day: 8 Patient: Chika Johnson (50 y.o. female)    Date: 9/16/2018 Primary Diagnosis: Tachycardia Status epilepticus (HCC)  
 ,  ,  
Precautions: Fall, Seizure Pt with elevated /82, above recommended parameters. Held PT per nurse, Scotty Greene, at this time. Will check back after given meds.   
 
Radha Joseph, PTA

## 2018-09-16 NOTE — ROUTINE PROCESS
Bedside and Verbal shift change report given to Salty Virgen RN (oshncoming nurse) by Sofia Ovalles RN (offgoing nurse). Report included the folowing information SBAR, Kardex, Intake/Output, MAR and Recent Results.

## 2018-09-16 NOTE — PROGRESS NOTES
0740: Bedside and Verbal shift change report given to Cata Colón RN (oncoming nurse) by Bev Howard RN (offgoing nurse). Report included the following information SBAR, Kardex and MAR.

## 2018-09-16 NOTE — PROGRESS NOTES
Problem: Mobility Impaired (Adult and Pediatric) Goal: *Acute Goals and Plan of Care (Insert Text) Physical Therapy Goals Initiated 9/14/2018 and to be accomplished within 7 days. 1.  Patient will complete all bed mobility with supervision/set-up in order to prepare for EOB/OOB activity. 2.  Patient will perform sit <> stand with supervision/set-up in order to prepare for OOB/gait activity. 3.  Patient will perform bed to chair transfers with minimal assistance/contact guard assist in order to promote mobility and encourage seated activity to progress towards their prior level of function. 4.  Patient will ambulate 25 feet with minimal assistance/contact guard assist using LRAD with step through gait in order to prepare for safe negotiation of their environment. Outcome: Progressing Towards Goal 
PHYSICAL THERAPY: Daily TREATMENT Note INPATIENT: [employer]: Hospital Day: 8 Patient: Matty Arias (34 y.o. female)    Date: 9/16/2018 Primary Diagnosis: Tachycardia Status epilepticus (HCC)  
 ,  ,  
Precautions: Fall, Seizure FWB Chart, physical therapy assessment, plan of care and goals were reviewed. PLOF:independent with ADLs and amb ASSESSMENT: 
Pt progressing well. eager to participate in PT. SUpervision for bed mobility, and SBA for sup>sit. Note increased time needed. Pt sitting on EOB without c/o dizziness. UT=426/69, , 02 SAts 94%. Pt reports seeing strings since being in hospital. Pt performs LE TE without difficulty. SBA>CGA for sit<>std, with VCs to increase BEATRIZ, and to push UE off bed. CGA for  amb x15' with RW to rankin, daughter following with IV. c/o mild dizziness, instructed to take a seated rest break. Pt with no sxs able to amb back to bed x 10', demo'd reaching back to sit without VCs. P. Amb x 5' to chair with RW and SBA. Nurse; Jeff Tse, in room after session. call bell in reach. Progression toward goals: Improving appropriately and progressing toward goals(X) Improving slowly and progressing toward goals Not making progress toward goals and plan of care will be adjusted PLAN: 
Patient continues to benefit from skilled intervention to address the above impairments. Continue treatment per established plan of care. EDUCATION:  
Education:  Patient was educated on the following topics: Pt ed on importance + benefits to perform bed mobility and exercises every 1-2 hours to increase/maintain LE/core strength to promote functional mobility, pt verbalizes understanding. Barriers to Learning/Limitations: None Compensate with: visual, verbal, tactile, kinesthetic cues/model Discharge Recommendations:  Rehab and Inpatient Rehab Further Equipment Recommendations for Discharge:  rolling walker and TBD Factors which may impact discharge planning: n/a SUBJECTIVE:  
Patient stated Yeah, I want to get moving.  OBJECTIVE DATA SUMMARY:  
Critical Behavior: 
Neurologic State: Alert Orientation Level: Oriented X4 Cognition: Appropriate decision making Safety/Judgement: Fall prevention [address] Standing Balance Scale 
0: Pt performs 25% or less of standing activity (Max assist) CN, 100% impaired. 1: Pt supports self with upper extremities but requires therapist assistance. Pt performs 25-50% of effort (Mod assist) CM, 80% to <100% impaired. 1+: Pt supports self with upper extremities but requires therapist assistance. Pt performs >50% effort. (Min assist). CL, 60% to <80% impaired. 2: Pt supports self independently with both upper extremities (walker, crutches, parallel bars). CL, 60% to <80% impaired. 2+: Pt support self independently with 1 upper extremity (cane, crutch, 1 parallel bar). CK, 40% to <60% impaired. 3: Pt stands without upper extremity support for up to 30 seconds. CK, 40% to <60% impaired. 3+: Pt stands without upper extremity support for 30 seconds or greater. CJ, 20% to <40% impaired. 4: Pt independently moves and returns center of gravity 1-2 inches in one plane. CJ, 20% to <40% impaired. 4+: Pt independently moves and returns center of gravity 1-2 inches in multiple planes. CI, 1% to <20% impaired. 5: Pt independently moves and returns center of gravity in all planes greater than 2 inches. CH, 0% impaired. Functional Mobility: 
 
 
Functional Status Indep (I) Mod I Super-vision Min A Mod A Max A Total A Assist x2 Verbal cues Additional time Not tested Comments Rolling []  []  [x] []    []    []  []  [] [] [] [] Supine to sit []  []  SBA []  []  []  []  [] [] [] [] Sit to supine []  []  [] []  []  []  []  [] [] [] [] Sit to stand []  []  [x]CGA>SBA []  []  []  []  [] [] [] []   
Stand to sit []  []  [x]SBA []  []  []  []  [] [] [] [] Bed to chair transfers []  []  SBA []  []  []  []  [] [] [] [] Balance Good Shan Last Poor Unable Not tested Comments Sitting static [x]  []  []  []  [] Sitting dynamic [x]  []  []  []  []   
Standing static [x]  []  []  []  []   
Standing dynamic []  [x]  []  []  [] Mobility/Gait:  
Level of Assistance: Stand-by assistance and Contact guard assistance Assistive Device: rolling walker Distance Ambulated: 15 feet  x1, 10' x 1, 5 ' x 1 Base of Support: widened Speed/Mariana: pace decreased (<100 feet/min) and slow Step Length: left shortened and right shortened Swing Pattern: left asymmetrical and right asymmetrical 
Stance: left decreased and right decreased Gait Abnormalities: decreased step clearance and step to gait Therapeutic Exercises:  
 
 
 
EXERCISE Sets Reps Active Active Assist  
Passive Self ROM Comments Ankle Pumps 1 10  [x] [] [] [] Quad Sets/Glut Sets   [] [] [] [] Hamstring Sets   [] [] [] [] Short Arc Quads   [] [] [] [] Heel Slides 1 10 [x] [] [] [] Straight Leg Raises 1 10 [x] [] [] [] Hip Abd/Add   [] [] [] [] Long Arc Quads 1 10 [x] [] [] [] Seated Marching 1 10 [x] [] [] []   
Standing Marching   [] [] [] []   
sit <>std  1 10 [x] [] [] []   
 
 
Vital Signs Temp: 97.8 °F (36.6 °C) Pulse (Heart Rate): 90    
BP: 152/82 Resp Rate: 16    
O2 Sat (%): 94 %Pain:0 
Pre treatment pain level:0 Post treatment pain level:0 Activity Tolerance:  
fair+ After treatment:  
Patient left in no apparent distress sitting up in chair(X) Patient left in no apparent distress in bed Call bell left within reach(X) Nursing notified(X) Caregiver present Bed alarm activated [employer] ISSA Time Calculation: 41 mins

## 2018-09-16 NOTE — PROGRESS NOTES
7 [facility] Hospitalist Division Inpatient Daily Progress Note Patient: Devin Amezcua MRN: [id number]  CSN: [id number] YOB: 1960  Age: 60 y.o. Sex: female DOA: 9/9/2018 LOS:  LOS: 7 days Chief Complaint:  H/A, muscle spasms Interval History: PMHx of COPD, anxiety, HTN, GERD; presented to ED with c/o severe H/A and muscle spasms. Pt was stable in ED with teleneuro consult with plans for observation and MRI. Pt started to have actively seizure w/ tonic-clonic activity and eye deviation to the left. 8mg of ativan given, but refractory. Keppra load refractory. Depakote load of 1500mg; 1500 mg PB. Neurology consulted. LP completed-CSF negative tubes 1&4. CT head negative. History of isolated WBCs. Recently c/o left sided numbness per pt's daughter-had been prescribed lyrica and flexeril. Takes xanax nightly. No h/o seizure. SBP elevated in ED. MRI c/w small area of subarachnoid hemorrhage on cortical surface right frontal lobe plus patchy brain edema, possible 2/2 malignant hypertension. UDS + benzo, thc. Fungal prep negative. Cryptococcal antigen negative. Tube 1: 0 WBCs and 21 RBCs. Tube 4: 0 WBCs and 111 RBCs. Protein 40, glucose 71.  
9/10/18: EEG completed-results pending. Family updated. SBP < 140-prn increased; added cozaar. Await neurology recommendations. Need to address feeding-TF? -will defer ICU. Start phenobarbital wean and check levels. Acyclovir stopped 2/2 CSF WBC count 0, CSF glucose and protein levels normal.  
9/11/18: EEG completed-per neurology's reading-Abnormal recording revealing the presence of moderate slowing of the background activity consistent with encephalopathy. This is a nonspecific finding and may be due to toxic, metabolic or inflammatory causes. No epileptiform activity was noted. (per neuro reading).  Following commands today; able to verbalize, open eyes to name. Restless-sitter ordered. Pending CSF studies paraneoplastic profile, NMDA receptor antibodies, HSV1-2 PCR. WBCs w/ slight improvement. Replace K-lytes protocol added. Phenobarbital level check in. SBP < 140 (currently ok). Urinary retention-straight cath X2, bladder scan prn, place ken if needed (but would overall try to avoid w/ elevated white count and unclear etiology?). Will need PT/OT/Speech when improved for discharge planning. Assess feeding-has NG tube. Echo ordered, but not yet completed. Continue to follow neuro status-slowly improving. 9/12/18: Patient following commands, but still restless and states she is \"falling out of bed\". Speech still garbled and hard to understand. Wbc's slight improvement; afebrile. Lytes off; replace per protocol. K continues low, will add K to fluid as well. Keep SBP < 140. Blood cultures NGTD. HSV 1/2 negative. Other serologies pending. Will await neurology recommendations. PB d/c'd yesterday. Ativan d/c'd but then given for seizure like activity per RN staff during night. 9/13/18: Transfer out of ICU. Remove ken; voiding trial. NG tube out. Swallow eval-clear liquid diet-advanced as tolerated. MRI today pending. Sitter d/c'd. K improved. SBP ok. On RA. WBCs continue to improve. Afebrile. GI consulted for NG tube w/ red aspirate. 9/14/18: Protonix to oral. Seizure meds still IV-can switch to oral whenever neuro ok'd. MRI retry today. Still c/o H/A. NMDA receptor antibody pending-defer neuro. VSS. Will await MRI results-hopefully can complete today w/ pt compliance. Will need to know neurology's recommendations on discharge medications for seizure medications outpatient and follow-up. 9/15-  Plan dc Monday to snf,  MRI with some improvement no new acute findings , dc fluids (9/16) Subjective:  
  
Alert, oriented Mental status baseline Wants to go home Objective:  
  
Visit Vitals  /81 (BP 1 Location: Left arm, BP Patient Position: At rest)  Pulse 86  Temp 97.4 °F (36.3 °C)  Resp 16  
 Ht 5' 5\" (1.651 m)  Wt 104 kg (229 lb 4.5 oz)  SpO2 95%  BMI 38.15 kg/m2 Physical Exam: 
General appearance: alert, cooperative, no distress Lungs: clear to auscultation bilaterally Heart: regular rate and rhythm, S1, S2 normal 
Abdomen: soft, non tender, non distended. Normoactive bowel sounds. Extremities: extremities normal, atraumatic, no cyanosis or edema Skin: Skin color, texture, turgor normal.  
Neurologic: PERRL 2mm b/l, follows commands x4, AOX4 Intake and Output: 
Current Shift:  09/16 0701 - 09/16 1900 In: 1396.7 [P.O.:120; I.V.:1276.7] Out: - Last three shifts:  09/14 1901 - 09/16 0700 In: 3866.3 [P.O.:1168; I.V.:2698.3] Out: 4701 [[id number]] Recent Results (from the past 24 hour(s)) VALPROIC ACID Collection Time: 09/15/18  1:33 PM  
Result Value Ref Range Valproic acid 71 50 - 100 ug/ml GLUCOSE, POC Collection Time: 09/15/18 10:27 PM  
Result Value Ref Range Glucose (POC) 127 (H) 70 - 110 mg/dL GLUCOSE, POC Collection Time: 09/16/18  8:16 AM  
Result Value Ref Range Glucose (POC) 85 70 - 110 mg/dL GLUCOSE, POC Collection Time: 09/16/18 11:40 AM  
Result Value Ref Range Glucose (POC) 99 70 - 110 mg/dL Lab Results Component Value Date/Time Glucose 325 (H) 09/15/2018 06:28 AM  
 Glucose 358 (H) 09/15/2018 06:28 AM  
 Glucose 86 09/13/2018 05:09 AM  
 Glucose 77 09/12/2018 04:00 AM  
 Glucose 91 09/11/2018 03:45 AM  
 EXAM: CTA HEAD AND NECK 
  INDICATION: Subarachnoid hemorrhage, possible aneurysm 
  
COMPARISON: MR brain 9/9/2018 
  
TECHNIQUE:  Multiple axial CT images of the neck were obtained extending from 
the level of the aortic arch to the skull base after the administration of the 
IV contrast utilizing a CTA protocol. Maximum intensity projection reconstructions were performed in multiple planes. Multiple axial CT images of the head were obtained extending from below the 
level of the skull base to the vertex after the administration of the IV 
contrast utilizing a CTA protocol. Maximum intensity projection reconstructions 
were performed in three planes. Additional 3 D reconstructions were performed 
at a separate workstation. One or more dose reduction techniques were used on this CT: automated exposure 
control, adjustment of the mAs and/or kVp according to patient's size, and 
iterative reconstruction techniques. The specific techniques utilized on this CT 
exam have been documented in the patient's electronic medical record. 
  
FINDINGS: 
  
Motion artifact is present which limits evaluation. 
  
CTA NECK 
  
Mild amount of atherosclerotic calcifications are present along the aortic arch. No high-grade proximal great vessel origin stenosis is seen. Mild narrowing of 
the proximal right subclavian artery origin is noted. 
  
The left common carotid artery is mildly irregular. The left carotid 
bifurcation is moderately irregular. Estimated minimal luminal diameter proximal 
left ICA 2.7 mm. Estimated luminal diameter of normal left ICA cervical segment 
is 5.1 mm. Estimated degree of stenosis of the left ICA based upon NASCET 
criteria is 48%. Remainder of left ICA cervical segment is minimally irregular. 
  
The right common carotid artery is slightly irregular. The right carotid 
bifurcation is mildly irregular. Estimated minimal luminal diameter proximal 
right ICA 3.7 mm. Estimated luminal diameter of normal right ICA cervical 
segment is 4.7 mm. Estimated degree of stenosis of the right ICA based upon NASCET criteria is 22%. Remainder of right ICA cervical segment is a normal 
variant vascular loop without focal stenosis. 
  
Left vertebral artery is mildly dominant.  No focal vertebral artery stenosis is 
seen. 
  
 Degenerative changes are seen in the spine. Nasogastric tube is present 
extending inferiorly out of the field-of-view in the esophagus. 
  
Air-fluid level is present in the right maxillary antrum. Additional right 
frontal sinus air-fluid level is present. Mild mucoperiosteal thickening seen in 
the ethmoidal air cells. Mastoid air cells are unremarkable. 
  
CTA HEAD 
  
There is no focal high-grade stenosis within the intracranial arteries. Mild 
atherosclerotic irregularity is seen in the bilateral carotid siphons without 
high grade stenosis. The carotid terminus is unremarkable.  
  
No high-grade ANDREA stenosis is seen. Very small anterior to indicating artery is 
suggested. Slight irregularity seen in the bilateral MCA vessels mostly along 
distal branches. 
  
Left vertebral artery is mildly dominant intracranially. PICA origins are 
unremarkable. Mild tortuosity is present involving the basilar artery without 
focal stenosis. Mild irregularity is seen in bilateral PCA branches distally. The dural venous sinuses are patent. Sharply defined rounded hypodensities are 
present distally in the transverse sinuses bilaterally compatible with 
pacchionian arachnoid granulation. No abnormal vessels are seen towards the 
vertex on the right in location of previously suggested small volume 
subarachnoid hemorrhage. 
  
  
_________________________ 
  
IMPRESSION IMPRESSION: 
  
1. No intracranial aneurysm or vascular malformation is identified to suggest 
etiology of previously seen small volume subarachnoid hemorrhage. 
  
2.  Mild to moderate irregularity involving bilateral proximal ICA, estimated 48% stenosis on the left and 22% stenosis on the right, based on NASCET 
criteria.  
  
3. No high-grade vertebral artery stenosis. 
  
4. No definite high-grade intracranial stenosis.  Mild multifocal irregularity 
and narrowing involving both anterior and posterior circulation, greatest 
 involving the bilateral PCA. This finding is nonspecific but may represent 
intracranial atherosclerotic disease. Difficult to exclude other vasculitic 
processes. 
  
5. No evidence of dural venous sinus thrombosis. Assessment/Plan:  
 
Patient Active Problem List  
Diagnosis Code  DJD (degenerative joint disease) M19.90  
 HTN (hypertension) I10  
 COPD (chronic obstructive pulmonary disease) ([facility] J44.9  Status post laparoscopic cholecystectomy Z90.49  Status epilepticus ([facility] [address] G40.901  Anxiety F41.9  GERD (gastroesophageal reflux disease) K21.9  Acute kidney failure (HCC) N17.9  Erythrocytosis D75.1  Lymphocytosis D72.820  
 Positive urine drug screen R82.5  
 SAH (subarachnoid hemorrhage) (HCC) I60.9  PRES (posterior reversible encephalopathy syndrome) I67.83  
 
 
A/P: 
 
Seizure Activity/acute encephalopathy  
-2/2 PRES and small SAH  
-neuro following-appreciate  
-Depakote and vimpat per neuro 
-seizure precautions- 
-CT head negative -EEG completed 9/10-IMPRESSION:  Abnormal recording revealing the presence of moderate slowing of the background activity consistent with encephalopathy. This is a nonspecific finding and may be due to toxic, metabolic or inflammatory causes. No epileptiform activity was noted. (per neuro reading) -MRI c/w small area of subarachnoid hemorrhage on cortical surface right frontal lobe plus patchy brain edema, possible 2/2 malignant hypertension. -LP completed-CSF negative Subarachnoid bleed  
-neurology following -appreciate  
-MRI initial showed small area of subarachnoid hemorrhage on cortical surface right frontal lobe plus patchy brain edema, possible 2/2 malignant hypertension. -SBP < 140  (currently BP ok) -CTA negative for aneurysm  
-MRI repeat (9/14) poor quality but looks like some improvement in previous signs of PRES and SAH.    
  
PRES  
-keep SBP < 140  
-norvasc 10 mg, HCTZ 
 
COPD 
 -sat goal > 90% (titrate down O2 as tolerated) -on RA  
-PCCM following 
-pulmicort/brovana UTI 
-rocephin - completed 3 days Urinary retention 
-bladder scan prn 
 
GI bleed 
-protonix oral 
-GI consulted-appreciate 
-no active s/s GI bleed GERD  
-protonix Plan DC Monday to SNF 
DC IVF today

## 2018-09-16 NOTE — PROGRESS NOTES
1955: Assumed patient care. Received report from Frank Mcneill, [facility] (offgoing nurse). Report included SBAR, Kardex, and MAR. Patient resting in bed in no signs of pain or distress, asleep at this time. Bed in lowest setting. 1950: Spoke to Diane Holloway in pharmacy who stated they are making VIMPAT and will deliver it. 2120: Patient stating she sees strings in room , reoriented patient. Patient states the strings are not accompanied by headache, blurry visiion or any other visual distrubance, no dizziness.

## 2018-09-16 NOTE — PROGRESS NOTES
Assumed patient care. Received patient asleep. No s/ s of pain/ discomfort noted. Bed is locked and in lowest position and call bell is within reach. Family at bedside. Not in acute distress.

## 2018-09-17 LAB
BACTERIA SPEC CULT: NORMAL
BACTERIA SPEC CULT: NORMAL
GLUCOSE BLD STRIP.AUTO-MCNC: 111 MG/DL (ref 70–110)
GLUCOSE BLD STRIP.AUTO-MCNC: 135 MG/DL (ref 70–110)
SERVICE CMNT-IMP: NORMAL
SERVICE CMNT-IMP: NORMAL

## 2018-09-17 PROCEDURE — 74011250636 HC RX REV CODE- 250/636: Performed by: NURSE PRACTITIONER

## 2018-09-17 PROCEDURE — 97116 GAIT TRAINING THERAPY: CPT

## 2018-09-17 PROCEDURE — 74011250637 HC RX REV CODE- 250/637: Performed by: PSYCHIATRY & NEUROLOGY

## 2018-09-17 PROCEDURE — 74011250637 HC RX REV CODE- 250/637: Performed by: HOSPITALIST

## 2018-09-17 PROCEDURE — 97535 SELF CARE MNGMENT TRAINING: CPT

## 2018-09-17 PROCEDURE — 97530 THERAPEUTIC ACTIVITIES: CPT

## 2018-09-17 PROCEDURE — 74011250637 HC RX REV CODE- 250/637: Performed by: INTERNAL MEDICINE

## 2018-09-17 PROCEDURE — 94640 AIRWAY INHALATION TREATMENT: CPT

## 2018-09-17 PROCEDURE — 65660000000 HC RM CCU STEPDOWN

## 2018-09-17 PROCEDURE — 74011000250 HC RX REV CODE- 250: Performed by: INTERNAL MEDICINE

## 2018-09-17 PROCEDURE — 82962 GLUCOSE BLOOD TEST: CPT

## 2018-09-17 RX ORDER — HYDROCHLOROTHIAZIDE 50 MG/1
50 TABLET ORAL DAILY
Qty: 30 TAB | Refills: 0 | Status: SHIPPED | OUTPATIENT
Start: 2018-09-17

## 2018-09-17 RX ORDER — LACOSAMIDE 100 MG/1
100 TABLET ORAL 2 TIMES DAILY
Qty: 60 TAB | Refills: 0 | Status: SHIPPED | OUTPATIENT
Start: 2018-09-17

## 2018-09-17 RX ORDER — DIVALPROEX SODIUM 250 MG/1
750 TABLET, DELAYED RELEASE ORAL 3 TIMES DAILY
Qty: 270 TAB | Refills: 0 | Status: SHIPPED | OUTPATIENT
Start: 2018-09-17

## 2018-09-17 RX ORDER — AMLODIPINE BESYLATE 10 MG/1
10 TABLET ORAL DAILY
Qty: 30 TAB | Refills: 0 | Status: SHIPPED | OUTPATIENT
Start: 2018-09-17

## 2018-09-17 RX ORDER — PANTOPRAZOLE SODIUM 40 MG/1
40 TABLET, DELAYED RELEASE ORAL
Qty: 60 TAB | Refills: 0 | Status: SHIPPED | OUTPATIENT
Start: 2018-09-17

## 2018-09-17 RX ORDER — HYDROCODONE BITARTRATE AND ACETAMINOPHEN 5; 325 MG/1; MG/1
1 TABLET ORAL
Qty: 18 TAB | Refills: 0 | Status: SHIPPED | OUTPATIENT
Start: 2018-09-17

## 2018-09-17 RX ADMIN — Medication 10 ML: at 17:25

## 2018-09-17 RX ADMIN — DIVALPROEX SODIUM 750 MG: 500 TABLET, DELAYED RELEASE ORAL at 08:56

## 2018-09-17 RX ADMIN — Medication 10 ML: at 06:00

## 2018-09-17 RX ADMIN — DIVALPROEX SODIUM 750 MG: 500 TABLET, DELAYED RELEASE ORAL at 16:30

## 2018-09-17 RX ADMIN — Medication 10 ML: at 14:24

## 2018-09-17 RX ADMIN — Medication 10 ML: at 14:23

## 2018-09-17 RX ADMIN — ARFORMOTEROL TARTRATE 15 MCG: 15 SOLUTION RESPIRATORY (INHALATION) at 20:39

## 2018-09-17 RX ADMIN — HYDROCODONE BITARTRATE AND ACETAMINOPHEN 1 TABLET: 5; 325 TABLET ORAL at 12:35

## 2018-09-17 RX ADMIN — LACOSAMIDE 100 MG: 50 TABLET, FILM COATED ORAL at 17:24

## 2018-09-17 RX ADMIN — HYDROCODONE BITARTRATE AND ACETAMINOPHEN 1 TABLET: 5; 325 TABLET ORAL at 17:35

## 2018-09-17 RX ADMIN — ACETAMINOPHEN 650 MG: 325 TABLET, FILM COATED ORAL at 02:26

## 2018-09-17 RX ADMIN — HYDROCHLOROTHIAZIDE 50 MG: 25 TABLET ORAL at 08:56

## 2018-09-17 RX ADMIN — BUDESONIDE 500 MCG: 0.5 INHALANT RESPIRATORY (INHALATION) at 20:39

## 2018-09-17 RX ADMIN — HYDRALAZINE HYDROCHLORIDE 20 MG: 20 INJECTION INTRAMUSCULAR; INTRAVENOUS at 02:25

## 2018-09-17 RX ADMIN — AMLODIPINE BESYLATE 10 MG: 10 TABLET ORAL at 08:56

## 2018-09-17 RX ADMIN — LACOSAMIDE 100 MG: 50 TABLET, FILM COATED ORAL at 08:56

## 2018-09-17 RX ADMIN — PANTOPRAZOLE SODIUM 40 MG: 40 TABLET, DELAYED RELEASE ORAL at 16:30

## 2018-09-17 RX ADMIN — HYDROCODONE BITARTRATE AND ACETAMINOPHEN 1 TABLET: 5; 325 TABLET ORAL at 00:06

## 2018-09-17 RX ADMIN — PANTOPRAZOLE SODIUM 40 MG: 40 TABLET, DELAYED RELEASE ORAL at 08:56

## 2018-09-17 RX ADMIN — DIVALPROEX SODIUM 750 MG: 500 TABLET, DELAYED RELEASE ORAL at 22:44

## 2018-09-17 RX ADMIN — HYDROCODONE BITARTRATE AND ACETAMINOPHEN 1 TABLET: 5; 325 TABLET ORAL at 23:30

## 2018-09-17 RX ADMIN — BUDESONIDE 500 MCG: 0.5 INHALANT RESPIRATORY (INHALATION) at 07:32

## 2018-09-17 RX ADMIN — ARFORMOTEROL TARTRATE 15 MCG: 15 SOLUTION RESPIRATORY (INHALATION) at 07:32

## 2018-09-17 NOTE — PROGRESS NOTES
0720: Bedside and Verbal shift change report given to CAT Prajapati (oncoming nurse) by Daniel Morse RN (offgoing nurse). Report included the following information SBAR, Kardex and MAR.

## 2018-09-17 NOTE — PROGRESS NOTES
Assumed patient care from Hayde Denise, [facility]. Received patient awake, alert, oriented X4. Patient denies pain at this time. Bed is locked and in lowest position and call bell is within reach. Not in acute distress.

## 2018-09-17 NOTE — PROGRESS NOTES
conducted a Follow up consultation and Spiritual Assessment for Zander West, who is a 60 y.o.,female. The  provided the following Interventions: 
Continued the relationship of care and support. Two daughters at bedside. Listened empathically to patient. She said she could not remember a lot of things until about three days ago while still in ICU. She is glad to be able to leave probably tomorrow to transfer to a rehab facility in [place]. Offered assurance of prayer for patient. Chart reviewed. The following outcomes were achieved: 
Patient and family members expressed gratitude for pastoral care visit. Assessment: 
There are no further spiritual or Judaism issues which require Spiritual Care Services interventions at this time. Plan: 
Chaplains will continue to follow and provide pastoral care as needed or requested.  recommends bedside caregivers page  on duty if patient shows signs of acute spiritual or emotional distress. The Rev. Bhavik Venegas [address] Spiritual Care Services [address] [facility] [facility] [phone] / [facility] [phone]

## 2018-09-17 NOTE — PROGRESS NOTES
[facility] pt, they have started authorization process. Do not yet have an [place] from insurance. Paged Mikhail Mcmahon.

## 2018-09-17 NOTE — ROUTINE PROCESS
Bedside and Verbal shift change report given to [facility], RN (oshncoming nurse) by Kavon Perez RN (offgoing nurse). Report included the folowing information SBAR, Kardex, Intake/Output, MAR and Recent Results.

## 2018-09-17 NOTE — PROGRESS NOTES
[facility] cannot [facility], called pts third choice [address] [place] [address] and rehab. They will review and let me know.

## 2018-09-17 NOTE — PROGRESS NOTES
Problem: Self Care Deficits Care Plan (Adult) Goal: *Acute Goals and Plan of Care (Insert Text) Occupational Therapy Goals Initiated 9/15/2018 within 7 day(s). 1.  Patient will perform grooming with supervision/set-up standing at sink for 5 minutes or more. 2.  Patient will perform upper body dressing with independence. 3.  Patient will perform lower body dressing with independence. 4.  Patient will perform toilet transfers with supervision/set-up. 5.  Patient will perform all aspects of toileting with supervision/set-up. 6.  Patient will participate in upper extremity therapeutic exercise/activities with independence for 10 minutes. 7.  Patient will utilize energy conservation techniques during functional activities with verbal, visual and tactile cues. Outcome: Progressing Towards Goal 
Occupational Therapy TREATMENT Patient: Colletta Ren (18 y.o. female) Date: 9/17/2018 Diagnosis: Tachycardia Status epilepticus ([address] Status epilepticus ([address] Precautions: Fall, Seizure Chart, occupational therapy assessment, plan of care, and goals were reviewed. ASSESSMENT: 
Pt semi-reclined in bed with supportive daughter present. Pt maneuvered to EOB with Supervision in prep for OOB ADLs. Seated EOB, pt able to cross legs and doff/ryley B socks. Pt performed bathroom mobility with Min/CGA using RW for safety; pt takes short steps and picks up walker requiring min vc for safety. Standing at sink, pt completed simple grooming (washing face/oral care) and UB ADLs given CGA for safety. Pt required verbal/visual cues to orient hospital gown to doff/ryley and Min A while standing. Pt tolerated approximately 12 mins standing at sink with noted sway with static stance which required increased cues. Pt returned to bed with CGA and Min vc for side-stepping technique. Pt left comfortably in bed with HOB raised.  
EDUCATION Pt and daughter educated on importance of pt completing ADLs as independently as possible. Progression toward goals: 
[x]          Improving appropriately and progressing toward goals 
[]          Improving slowly and progressing toward goals 
[]          Not making progress toward goals and plan of care will be adjusted PLAN: 
Patient continues to benefit from skilled intervention to address the above impairments. Continue treatment per established plan of care. Discharge Recommendations:  Rehab Further Equipment Recommendations for Discharge:  bedside commode SUBJECTIVE:  
Patient stated I am ready for my ticket home.  OBJECTIVE DATA SUMMARY: 
  
G CODES:  Self Care  Current  CJ= 20-39%  Goal  CI= 1-19%. Cognitive/Behavioral Status: 
Neurologic State: Alert Orientation Level: Oriented X4 Cognition: Follows commands Safety/Judgement: Fall prevention Functional Mobility and Transfers for ADLs: 
 Bed Mobility: 
Rolling: Supervision Supine to Sit: Supervision Sit to Supine: Supervision Transfers: 
Sit to Stand: Stand-by assistance Bathroom Mobility: Contact guard assistance;Minimum assistance Balance: 
Sitting: Intact Standing: Impaired; With support ADL Intervention: 
Grooming Grooming Assistance: Contact guard assistance (standing at sink) Washing Face: Contact guard assistance Brushing Teeth: Contact guard assistance Cues: Verbal cues provided (RW standing at sink) Upper Body Bathing Bathing Assistance: Contact guard assistance Position Performed: Standing Cues: Verbal cues provided Adaptive Equipment: [employer] Upper Body Dressing Assistance Hospital Gown: Minimum  assistance Cues: Verbal cues provided;Visual cues provided Lower Body Dressing Assistance Socks: Stand-by assistance Leg Crossed Method Used: Yes Position Performed: Seated edge of bed Pain: 
Pt denied pain this session Activity Tolerance:   
Fair Please refer to the flowsheet for vital signs taken during this treatment. After treatment: []  Patient left in no apparent distress sitting up in chair 
[x]  Patient left in no apparent distress in bed 
[x]  Call bell left within reach 
[]  Nursing notified 
[x]  Supportive daughter present 
[]  Bed alarm activated Dayana Matos GIPSON/L Time Calculation: 38 mins

## 2018-09-17 NOTE — PROGRESS NOTES
[facility] cannot accept sec to vimpat too expensive. [facility]nd choice [facility] looking [facility]. Called, spoke with flor,NP to see if can change vimpat, she called back, received message they will not change med. notified pt.

## 2018-09-17 NOTE — PROGRESS NOTES
1950: Assumed patient care. Received report from Soledad Colon [facility] (offgoing nurse). Report included SBAR, kardex, and MAR. Patient resting in bed in no signs of pain or distress. Call light and possessions within reach, bed in lowest setting. 0006: Noro given for headache 8/10 
0030: reassessment 4/10 states \"getting better\" 0226: Patient states she has headache once again, elevated /87, tylenol and apresoline given. Will reassess. 0308: reassessment Patient sleeping

## 2018-09-17 NOTE — PROGRESS NOTES
Problem: Mobility Impaired (Adult and Pediatric) Goal: *Acute Goals and Plan of Care (Insert Text) Physical Therapy Goals Initiated 9/14/2018 and to be accomplished within 7 days. 1.  Patient will complete all bed mobility with supervision/set-up in order to prepare for EOB/OOB activity. 2.  Patient will perform sit <> stand with supervision/set-up in order to prepare for OOB/gait activity. 3.  Patient will perform bed to chair transfers with minimal assistance/contact guard assist in order to promote mobility and encourage seated activity to progress towards their prior level of function. 4.  Patient will ambulate 25 feet with minimal assistance/contact guard assist using LRAD with step through gait in order to prepare for safe negotiation of their environment. Outcome: Progressing Towards Goal 
 
PHYSICAL THERAPY: Daily TREATMENT Note INPATIENT: [facility]: Hospital Day: 9 Patient: Karan Jackson (89 y.o. female)    Date: 9/17/2018 Primary Diagnosis: Tachycardia Status epilepticus (HCC)  
 ,  ,  
Precautions: Fall, Seizure Chart, physical therapy assessment, plan of care and goals were reviewed. PLOF: Independent ASSESSMENT: 
Patient supine in bed, agreeable to participation with PT. Patient requires MIN A  X 1 for supine <> sit. CGA for sit <> stand with RW for support. Patient demo's fair standing balance. Ambulates 10 ft with RW demo'ing short strides and unsteady gait. Gait belt donned no patient. Patient able to ambulate 25 ft x 30 x 25 ft with standing rest breaks in between bouts of gait; requries CGA and gait belt with RW. Increased verbal cuing throughout gait for improved step length and widened BEATRIZ to decrease risk of falling. Demo's decreased steadiness negotiating turns using RW. Returned to room d/t c/o of fatigue and feeling warm. Patient requires manual assist to negotiate RW with return to bed.  Verbal cuing with stand to sit for safe transfer. Left supine with all needs within reach. No c/o pain. Progression toward goals: 
      Improving appropriately and progressing toward goals PLAN: 
Patient continues to benefit from skilled intervention to address the above impairments. Continue treatment per established plan of care. EDUCATION:  
Education:  Patient was educated on the following topics: safety with gait, strategy for use of RW. Verbalizes understanding. Barriers to Learning/Limitations: None Compensate with: visual, verbal, tactile, kinesthetic cues/model Discharge Recommendations:  Rehab Further Equipment Recommendations for Discharge:  rolling walker Factors which may impact discharge planning: N/A  
 
SUBJECTIVE:  
Patient stated I feel nauseous .  OBJECTIVE DATA SUMMARY:  
Critical Behavior: 
Neurologic State: Alert Orientation Level: Oriented X4 Cognition: Follows commands Safety/Judgement: Fall prevention G CODE:Mobility [id number] Current  CL= 60-79%   Goal  CL= 60-79%. The severity rating is based on the Other [facility] Balance Scale 2/5 209 [address] Standing Balance Scale 2/5 
0: Pt performs 25% or less of standing activity (Max assist) CN, 100% impaired. 1: Pt supports self with upper extremities but requires therapist assistance. Pt performs 25-50% of effort (Mod assist) CM, 80% to <100% impaired. 1+: Pt supports self with upper extremities but requires therapist assistance. Pt performs >50% effort. (Min assist). CL, 60% to <80% impaired. 2: Pt supports self independently with both upper extremities (walker, crutches, parallel bars). CL, 60% to <80% impaired. 2+: Pt support self independently with 1 upper extremity (cane, crutch, 1 parallel bar). CK, 40% to <60% impaired. 3: Pt stands without upper extremity support for up to 30 seconds. CK, 40% to <60% impaired. 3+: Pt stands without upper extremity support for 30 seconds or greater. CJ, 20% to <40% impaired. 4: Pt independently moves and returns center of gravity 1-2 inches in one plane. CJ, 20% to <40% impaired. 4+: Pt independently moves and returns center of gravity 1-2 inches in multipl e planes. CI, 1% to <20% impaired. 5: Pt independently moves and returns center of gravity in all planes greater than 2 inches. CH, 0% impaired. Functional Mobility: 
 
 
Functional Status Indep (I) Mod I Super-vision Min A Mod A Max A Total A Assist x2 Verbal cues Additional time Not tested Comments Rolling []  []  [] []    []    []  []  [] [] [] [x] Supine to sit []  []  [] [x]  []  []  []  [] [] [] [] Sit to supine []  []  [x] []  []  []  []  [] [] [] [x] Sit to stand []  []  [x] []  []  []  []  [] [] [] [] CGA Stand to sit []  []  [x] []  []  []  []  [] [] [] [] CGA Bed to chair transfers []  []  [] []  []  []  []  [] [] [] [] Balance Good Doyce Balling Poor Unable Not tested Comments Sitting static [x]  []  []  []  [] Sitting dynamic [x]  []  []  []  []   
Standing static []  [x]  []  []  []   
Standing dynamic []  [x]  []  []  [] Mobility/Gait:  
Level of Assistance: Contact guard assistance Assistive Device: rolling walker Distance Ambulated: 25 ft x 30 x 25 ft with standing rest breaks and verbal cuing for improved stride length and BEATRIZ for safety with gait. Left Lower Extremity: FWB Right Lower Extremity: FWB Base of Support: center of gravity altered Speed/Mariana: fluctuations Step Length: left shortened and right shortened Swing Pattern: [facility] Stance: [facility] Gait Abnormalities: altered arm swing, decreased step clearance and step to gait Vital Signs Temp: 97.8 °F (36.6 °C) Pulse (Heart Rate): 85    
BP: 113/63 Resp Rate: 16    
O2 Sat (%): 96 % Pain: None Activity Tolerance: Fair, limited by fatigue After treatment:  
Patient left in no apparent distress in bed Call bell left within reach Nursing notified Sandra Long Time Calculation: 31 mins

## 2018-09-17 NOTE — DISCHARGE SUMMARY
[facility]    Discharge Summary    Patient: Eunice De La Vega MRN: [id number]  CSN: [id number]    YOB: 1960  Age: 60 y.o.   Sex: female    DOA: 9/9/2018 LOS:  LOS: 8 days   Discharge Date: 9/17/2018     Admission Diagnoses: Tachycardia  Status epilepticus [facility])    Discharge Diagnoses:    Problem List as of 9/17/2018  Date Reviewed: 9/9/2018          Codes Class Noted - Resolved    SAH (subarachnoid hemorrhage) ([facility] ICD-10-CM: I60.9  ICD-9-CM: 376  9/14/2018 - Present        PRES (posterior reversible encephalopathy syndrome) ICD-10-CM: I67.83  ICD-9-CM: 348.39  9/14/2018 - Present        * (Principal)Status epilepticus ([facility] ICD-10-CM: G40.901  ICD-9-CM: 345.3  9/9/2018 - Present        Anxiety ICD-10-CM: F41.9  ICD-9-CM: 300.00  9/9/2018 - Present        GERD (gastroesophageal reflux disease) ICD-10-CM: K21.9  ICD-9-CM: 530.81  9/9/2018 - Present    Overview Signed 9/9/2018 10:05 AM by Ricky Cottrell DO     H/O--NOT ON MEDICATION AT PRESENT             Acute kidney failure ([facility] ICD-10-CM: N17.9  ICD-9-CM: 584.9  9/9/2018 - Present        Erythrocytosis ICD-10-CM: D75.1  ICD-9-CM: 289.0  9/9/2018 - Present        Lymphocytosis ICD-10-CM: D72.820  ICD-9-CM: 288.61  9/9/2018 - Present        Positive urine drug screen ICD-10-CM: R82.5  ICD-9-CM: 796.0  9/9/2018 - Present    Overview Signed 9/9/2018 11:06 AM by Ankita Fairbanks MD     THC             Status post laparoscopic cholecystectomy ICD-10-CM: Z90.49  ICD-9-CM: V45.89  10/22/2013 - Present        DJD (degenerative joint disease) ICD-10-CM: M19.90  ICD-9-CM: 715.90  9/27/2013 - Present        HTN (hypertension) ICD-10-CM: I10  ICD-9-CM: 401.9  9/27/2013 - Present        COPD (chronic obstructive pulmonary disease) ([facility] ICD-10-CM: J44.9  ICD-9-CM: 496  9/27/2013 - Present        RESOLVED: Tachycardia ICD-10-CM: R00.0  ICD-9-CM: 785.0  9/9/2018 - 9/14/2018        RESOLVED: Postictal state ([facility] ICD-10-CM: R56.9  ICD-9-CM: 780.39  9/9/2018 - 9/14/2018              Discharge Condition: Stable    Discharge To: Rehab    Consults: Hospitalist, Neurology and Pulmonary/Critical [facility] Course: 61 y/o  female with hx of COPD, anxiety, HTN, GERD; presented to ED on 9/9 with c/o severe H/A and muscle spasms. Pt was stable in ED with teleneuro consult with plans for observation and MRI. Pt started to have actively seizure w/ tonic-clonic activity and eye deviation to the left. Ativan 8mg given, but refractory. Keppra load refractory. Depakote load of 1500mg; Phenobarbital 1500 mg given. Neurology consulted. LP completed-CSF negative tubes 1&4. CT head negative. History of isolated WBCs. Recently c/o left sided numbness per pt's daughter-had been prescribed lyrica and flexeril. Takes xanax nightly. No h/o seizure. SBP elevated in ED. MRI c/w small area of subarachnoid hemorrhage on cortical surface right frontal lobe plus patchy brain edema, possible 2/2 malignant hypertension. UDS + benzo, thc. Fungal prep negative. Cryptococcal antigen negative. Tube 1: 0 WBCs and 21 RBCs. Tube 4: 0 WBCs and 111 RBCs. Protein 40, glucose 71. EEG completed on 9/10- per neurology's reading, abnormal recording revealing the presence of moederate slowing of background activity consistent with encephalopathy. Nonspecific finding and may be due to toxic, metabolic or inflammatory causes, no epileptiform activity noted. Acyclovir stopped on 9/10. Per EMR, on 9/11/18 pt noted to follow commands, opens eyes to name and able to verbalize. Restless-sitter ordered. Pending CSF studies paraneoplastic profile, NMDA receptor antibodies, HSV1-2 PCR. WBCs w/ slight improvement. Urinary retention-straight cath X2, bladder scan prn, place ken if needed (but would overall try to avoid w/ elevated white count and unclear etiology?). Will need PT/OT/Speech when improved for discharge planning. Nutrition via NGT. Echo ordered, but not yet completed.  Continue to follow neuro status-slowly improving. Patient following commands, but still restless and states she is \"falling out of bed\". Speech still garbled and hard to understand. Wbc's slight improvement; afebrile. Blood cultures NGTD. HSV 1/2 negative. Other serologies pending. She was transferred out of the ICU on 9/13. NGT and ken removed. Swallow eval completed. MRI pending. Tera mackay. GI consulted for NG tube w/ red aspirate. Pt started on oral Protonix on 9/14. CT head 9/13 showed resolution of SAH. MRI attempted again on 9/14 - showed improvement in patchy abnormal signal seen in bilateral parietal and occipital lobes, as well as seen in posterior left frontal lobe. No new areas of abnormal signal noted. Previously noted leptomeningeal enhancement in the parietal and occipital regions was improved. Subtle suggestion of blood products seen in the posterior right frontal sulcus towards the vertex; No new hemorrhage noted. Pt still c/o headache, relieved with Norco.  No seizure activity noted. Per neurology- continue Amlodopine, CCB's recommended for treatment of RCV's, do NOT restart Lexapro or other serotonergic medications, this is a known trigger for RCV's. Do not use Tramadol for headache's as tramadol can trigger seizures. Continue Vimpat 100 mg oral BID and Depakote 750 mg oral TID. Check Depakote levels on a weekly basis, monitor LFT's and ammonia occasionally on Depakote. She is appropriate for d/c to rehab, to follow up with PCP within one week and with neurology, Dr. Prince Martin within one month. Physical Exam:  General appearance: alert, cooperative, no distress  Head: Normocephalic, without obvious abnormality, atraumatic  Lungs: clear to auscultation bilaterally  Heart: regular rate and rhythm, S1, S2 normal, no murmur, click, rub or gallop  Abdomen: soft, non-tender.  Bowel sounds normal. No masses,  no organomegaly  Extremities: extremities normal, atraumatic, no cyanosis or edema  Skin: Skin color, texture, turgor normal. No rashes or lesions  Neurologic: Grossly normal  PSY: Mood and affect normal, appropriately behaved    Significant Diagnostic Studies: labs:   Recent Results (from the past 24 hour(s))   GLUCOSE, POC    Collection Time: 09/16/18  8:16 AM   Result Value Ref Range    Glucose (POC) 85 70 - 110 mg/dL   GLUCOSE, POC    Collection Time: 09/16/18 11:40 AM   Result Value Ref Range    Glucose (POC) 99 70 - 110 mg/dL   GLUCOSE, POC    Collection Time: 09/16/18  4:55 PM   Result Value Ref Range    Glucose (POC) 99 70 - 110 mg/dL   GLUCOSE, POC    Collection Time: 09/16/18  9:35 PM   Result Value Ref Range    Glucose (POC) 105 70 - 110 mg/dL         Discharge Medications:     Current Discharge Medication List      START taking these medications    Details   amLODIPine (NORVASC) 10 mg tablet Take 1 Tab by mouth daily. Qty: 30 Tab, Refills: 0      divalproex DR (DEPAKOTE) 250 mg tablet Take 3 Tabs by mouth three (3) times daily. Qty: 270 Tab, Refills: 0      lacosamide (VIMPAT) 100 mg tab tablet Take 1 Tab by mouth two (2) times a day. Max Daily Amount: 200 mg. Qty: 60 Tab, Refills: 0    Associated Diagnoses: Status epilepticus ([address]      hydroCHLOROthiazide (HYDRODIURIL) 50 mg tablet Take 1 Tab by mouth daily. Qty: 30 Tab, Refills: 0      pantoprazole (PROTONIX) 40 mg tablet Take 1 Tab by mouth Before breakfast and dinner. Qty: 60 Tab, Refills: 0         CONTINUE these medications which have CHANGED    Details   HYDROcodone-acetaminophen (NORCO) 5-325 mg per tablet Take 1 Tab by mouth every four (4) hours as needed. Max Daily Amount: 6 Tabs. Indications: Pain  Qty: 18 Tab, Refills: 0    Associated Diagnoses: PRES (posterior reversible encephalopathy syndrome)         CONTINUE these medications which have NOT CHANGED    Details   fluticasone-salmeterol (ADVAIR DISKUS) 250-50 mcg/dose diskus inhaler Take 1 Puff by inhalation every twelve (12) hours.       omega-3 fatty acids-vitamin e (FISH OIL) 1,000 mg cap Take 1 Cap by mouth. STOP taking these medications       escitalopram oxalate (LEXAPRO) 20 mg tablet Comments:   Reason for Stopping:         pregabalin (LYRICA) 50 mg capsule Comments:   Reason for Stopping:         ondansetron (ZOFRAN ODT) 8 mg disintegrating tablet Comments:   Reason for Stopping:         cyclobenzaprine (FLEXERIL) 10 mg tablet Comments:   Reason for Stopping:         losartan-hydrochlorothiazide (HYZAAR) 100-25 mg per tablet Comments:   Reason for Stopping:         ALPRAZolam (XANAX) 0.5 mg tablet Comments:   Reason for Stopping:               Activity: Activity as tolerated and PT/OT Eval and Treat    Diet: Cardiac Diet, Consistent Carb 8989-3566 kcal    Wound Care: None needed    Follow-up: Follow up with PCP within one week. Follow up with neurology, Dr. Saritha Solorio within one month.     Discharge time: > 35 mins  Kurt Mckeon NP  9/17/2018, 7:29 AM

## 2018-09-17 NOTE — PROGRESS NOTES
Assumed care of pt from Hugo Macias RN pt awake in bed A&O x 4 , no distress noted and denies pain. Frequently used items and call bell within reach. Patient verbalized understanding to use call bell for any needs or assistance. Bed locked in lowest position. 1432 Pt PICC dressing was bothering pt. Pt started scratching IV site and IV started to come out. IV was removed.

## 2018-09-17 NOTE — PROGRESS NOTES
Problem: Falls - Risk of 
Goal: *Absence of Falls Document Santos Reas Fall Risk and appropriate interventions in the flowsheet. Outcome: Progressing Towards Goal 
Fall Risk Interventions: 
  
 
Mentation Interventions: Toileting rounds Medication Interventions: Patient to call before getting OOB Elimination Interventions: Patient to call for help with toileting needs Problem: Pressure Injury - Risk of 
Goal: *Prevention of pressure injury Document Glenn Scale and appropriate interventions in the flowsheet. Outcome: Progressing Towards Goal 
Pressure Injury Interventions: 
Sensory Interventions: Assess changes in LOC Moisture Interventions: Maintain skin hydration (lotion/cream) Activity Interventions: Pressure redistribution bed/mattress(bed type) Mobility Interventions: HOB 30 degrees or less Nutrition Interventions: Document food/fluid/supplement intake Friction and Shear Interventions: HOB 30 degrees or less

## 2018-09-18 VITALS
SYSTOLIC BLOOD PRESSURE: 133 MMHG | RESPIRATION RATE: 15 BRPM | WEIGHT: 229.28 LBS | DIASTOLIC BLOOD PRESSURE: 57 MMHG | HEIGHT: 65 IN | TEMPERATURE: 97.6 F | BODY MASS INDEX: 38.2 KG/M2 | HEART RATE: 86 BPM | OXYGEN SATURATION: 98 %

## 2018-09-18 PROCEDURE — 94640 AIRWAY INHALATION TREATMENT: CPT

## 2018-09-18 PROCEDURE — 97535 SELF CARE MNGMENT TRAINING: CPT

## 2018-09-18 PROCEDURE — 74011250637 HC RX REV CODE- 250/637: Performed by: INTERNAL MEDICINE

## 2018-09-18 PROCEDURE — 74011250637 HC RX REV CODE- 250/637: Performed by: PSYCHIATRY & NEUROLOGY

## 2018-09-18 PROCEDURE — 74011250637 HC RX REV CODE- 250/637: Performed by: HOSPITALIST

## 2018-09-18 PROCEDURE — 74011000250 HC RX REV CODE- 250: Performed by: INTERNAL MEDICINE

## 2018-09-18 PROCEDURE — 97110 THERAPEUTIC EXERCISES: CPT

## 2018-09-18 RX ADMIN — ARFORMOTEROL TARTRATE 15 MCG: 15 SOLUTION RESPIRATORY (INHALATION) at 08:11

## 2018-09-18 RX ADMIN — BUDESONIDE 500 MCG: 0.5 INHALANT RESPIRATORY (INHALATION) at 08:11

## 2018-09-18 RX ADMIN — HYDROCHLOROTHIAZIDE 50 MG: 25 TABLET ORAL at 10:04

## 2018-09-18 RX ADMIN — AMLODIPINE BESYLATE 10 MG: 10 TABLET ORAL at 10:04

## 2018-09-18 RX ADMIN — PANTOPRAZOLE SODIUM 40 MG: 40 TABLET, DELAYED RELEASE ORAL at 10:04

## 2018-09-18 RX ADMIN — DIVALPROEX SODIUM 750 MG: 500 TABLET, DELAYED RELEASE ORAL at 10:04

## 2018-09-18 RX ADMIN — LACOSAMIDE 100 MG: 50 TABLET, FILM COATED ORAL at 10:03

## 2018-09-18 RX ADMIN — DIVALPROEX SODIUM 750 MG: 500 TABLET, DELAYED RELEASE ORAL at 15:53

## 2018-09-18 NOTE — PROGRESS NOTES
Problem: Falls - Risk of 
Goal: *Absence of Falls Document Avi Urias Fall Risk and appropriate interventions in the flowsheet. Outcome: Progressing Towards Goal 
Fall Risk Interventions: 
Mobility Interventions: Communicate number of staff needed for ambulation/transfer, Patient to call before getting OOB Mentation Interventions: Door open when patient unattended Medication Interventions: Patient to call before getting OOB, Bed/chair exit alarm Elimination Interventions: Call light in reach, Patient to call for help with toileting needs Problem: Pressure Injury - Risk of 
Goal: *Prevention of pressure injury Document Glenn Scale and appropriate interventions in the flowsheet. Outcome: Progressing Towards Goal 
Pressure Injury Interventions: 
Sensory Interventions: Assess changes in LOC Moisture Interventions: Absorbent underpads Activity Interventions: Pressure redistribution bed/mattress(bed type), Increase time out of bed, PT/OT evaluation Mobility Interventions: HOB 30 degrees or less, Pressure redistribution bed/mattress (bed type), PT/OT evaluation Nutrition Interventions: Document food/fluid/supplement intake Friction and Shear Interventions: HOB 30 degrees or less

## 2018-09-18 NOTE — PROGRESS NOTES
Problem: Falls - Risk of 
Goal: *Absence of Falls Document Carlene Grimes Fall Risk and appropriate interventions in the flowsheet. Outcome: Progressing Towards Goal 
Fall Risk Interventions: 
  
 
Mentation Interventions: Door open when patient unattended Medication Interventions: Patient to call before getting OOB Elimination Interventions: Patient to call for help with toileting needs Problem: Pressure Injury - Risk of 
Goal: *Prevention of pressure injury Document Glenn Scale and appropriate interventions in the flowsheet. Outcome: Progressing Towards Goal 
Pressure Injury Interventions: 
Sensory Interventions: Assess changes in LOC Moisture Interventions: Maintain skin hydration (lotion/cream) Activity Interventions: Pressure redistribution bed/mattress(bed type) Mobility Interventions: HOB 30 degrees or less Nutrition Interventions: Document food/fluid/supplement intake Friction and Shear Interventions: HOB 30 degrees or less

## 2018-09-18 NOTE — PROGRESS NOTES
Hospital to SNF SBAR Handoff - Don Hole 
                                                                      60 y.o.   female [address]   Room: 2403/01    [facility] 2E GEN SURG  Unit Phone# :  [phone] [address] Dept: [phone] Loc: [phone] SITUATION Admitted:  9/9/2018         Attending Provider:  Esther Madden DO Consultations:  IP CONSULT TO INTENSIVIST 
IP CONSULT TO NEUROLOGY 
IP CONSULT TO GASTROENTEROLOGY 
 
PCP:  Nissa Osborne MD   [phone] Treatment Team: Attending Provider: Esther Madden DO; Care Manager: Yaquelin Alegre RN; Consulting Provider: Ramon Johnson MD; Utilization Review: Kvng Moreno RN; Utilization Review: Colleen Hunt RN; Physical Therapist: Alex Fuentes; Occupational Therapy Assistant: Zi Ward Admitting Dx: Tachycardia Status epilepticus ([address] Principal Problem: Status epilepticus ([address] * No surgery found * of  
  
BY: * Surgery not found *             ON: * No surgery found * Code Status: Full Code Advance Directives:  
Advance Care Planning 9/10/2018 Patient's Healthcare Decision Maker is: Legal Next of Kin Primary Decision Maker Name Tiffany Garvin Primary Decision Maker Phone Number [phone] Primary Decision Maker Relationship to Patient Sibling Confirm Advance Directive None (Send w/patient) No Doesnt Have Isolation:  There are currently no Active Isolations       MDRO: No current active infections Pain Medications given:      Last dose:  at  n/a Special Equipment needed: no  Type of equipment: 
 
 
(Not currently on dialysis) BACKGROUND Allergies: Allergies Allergen Reactions  Lisinopril Cough Past Medical History:  
Diagnosis Date  Abdominal pain  Anxiety  COPD (chronic obstructive pulmonary disease) (HCC)  DJD (degenerative joint disease) of cervical spine  Elevated cholesterol  GERD (gastroesophageal reflux disease) H/O--NOT ON MEDICATION AT PRESENT  
 Hypertension  Nausea and vomiting Past Surgical History:  
Procedure Laterality Date  EXCISION TUMOR SOFT TISSUE FOOT/TOE SUBQ <1.5CM  2011  
 2 mccartney neuroma right foot  HX CARPAL TUNNEL RELEASE  2002  HX CHOLECYSTECTOMY  10/14/2013  HX HYSTERECTOMY  1994  
 vaginal (ovaries still in) [address]  IN REMOVAL 1ST/CERVICAL RIB  1984  
 right side 1st rib  REMOVAL OF RIB(S)  1976  
 left side 1st rib Prescriptions Prior to Admission Medication Sig  escitalopram oxalate (LEXAPRO) 20 mg tablet Take 20 mg by mouth daily.  pregabalin (LYRICA) 50 mg capsule Take 50 mg by mouth three (3) times daily.  HYDROcodone-acetaminophen (NORCO) 5-325 mg per tablet Take 1 Tab by mouth every eight (8) hours as needed for Pain.  ondansetron (ZOFRAN ODT) 8 mg disintegrating tablet Take 1 Tab by mouth every eight (8) hours as needed for Nausea.  fluticasone-salmeterol (ADVAIR DISKUS) 250-50 mcg/dose diskus inhaler Take 1 Puff by inhalation every twelve (12) hours.  omega-3 fatty acids-vitamin e (FISH OIL) 1,000 mg cap Take 1 Cap by mouth.  cyclobenzaprine (FLEXERIL) 10 mg tablet Take  by mouth three (3) times daily as needed.  losartan-hydrochlorothiazide (HYZAAR) 100-25 mg per tablet Take 1 Tab by mouth daily.  ALPRAZolam (XANAX) 0.5 mg tablet Take  by mouth. Hard scripts included in transfer packet yes Vaccinations: There is no immunization history on file for this patient. Readmission Risks:    Known Risks: The [employer]son [employer] Index tool is an evidenced based tool that has more automatic generated information.  The tool looks at many different items such as the age of the patient, how many times they were admitted in the last calendar year, current length of stay in the hospital and their diagnosis. All of these items are pulled automatically from information documented in the chart from various places and will generate a score that predicts whether a patient is at low (less than 13), medium (13-20) or high (21 or greater) risk of being readmitted. ASSESSMENT Temp: 97.6 °F (36.4 °C) (09/18/18 1353) Pulse (Heart Rate): 86 (09/18/18 1353) Resp Rate: 15 (09/18/18 1353)           BP: 133/57 (09/18/18 1353) O2 Sat (%): 98 % (09/18/18 1353) Weight: 104 kg (229 lb 4.5 oz)    Height: 5' 5\" (165.1 cm) (09/10/18 2461) If above not within 1 hour of discharge: 
 
BP:_____  P:____  R:____ T:_____ O2 Sat: ___%  O2: ______ Active Orders Diet DIET CARDIAC Regular; Consistent Carb 1500-1600kcal  
   
 
Orientation: oriented to time, place, person and situation Active Behaviors: None Active Lines/Drains:  (Peg Tube / [place] / CL or S/L?): no 
 
Urinary Status: Voiding     Last BM: Last Bowel Movement Date: 09/15/18 Skin Integrity: Intact Mobility: Slightly limited Weight Bearing Status: WBAT (Weight Bearing as Tolerated) Gait Training Assistive Device: Gait belt, Walker, rolling Ambulation - Level of Assistance: Contact guard assistance Distance (ft): 25 Feet (ft) Lab Results Component Value Date/Time Glucose 325 (H) 09/15/2018 06:28 AM  
 Glucose 358 (H) 09/15/2018 06:28 AM  
 Hemoglobin A1c 5.4 09/15/2018 06:28 AM  
 INR 0.9 09/09/2018 04:45 AM  
 HGB 11.2 (L) 09/15/2018 06:28 AM  
 HGB 11.3 (L) 09/13/2018 08:20 AM  
  
  RECOMMENDATION See After Visit Summary (AVS) for: · Discharge instructions · [facility] · Special equipment needed (entered pre-discharge by Care Management) · Medication Reconciliation · Follow up Appointment(s) Report given/sent by:  Edis Larsen Verbal report given to: Charge RN  FAXED to:   
    
Estimated discharge time:  9/18/2018 at 1600

## 2018-09-18 NOTE — PROGRESS NOTES
Problem: Self Care Deficits Care Plan (Adult) Goal: *Acute Goals and Plan of Care (Insert Text) Occupational Therapy Goals Initiated 9/15/2018 within 7 day(s). 1.  Patient will perform grooming with supervision/set-up standing at sink for 5 minutes or more. 2.  Patient will perform upper body dressing with independence. 3.  Patient will perform lower body dressing with independence. 4.  Patient will perform toilet transfers with supervision/set-up. 5.  Patient will perform all aspects of toileting with supervision/set-up. 6.  Patient will participate in upper extremity therapeutic exercise/activities with independence for 10 minutes. 7.  Patient will utilize energy conservation techniques during functional activities with verbal, visual and tactile cues. Outcome: Progressing Towards Goal 
Occupational Therapy TREATMENT Patient: Chika Johnson (58 y.o. female) Date: 9/18/2018 Diagnosis: Tachycardia Status epilepticus ([address] Status epilepticus ([address] Precautions: Fall, Seizure Chart, occupational therapy assessment, plan of care, and goals were reviewed. ASSESSMENT: 
Pt sitting up in chair completing bathing Mod I, agreeable to skilled OT services at this time. Pt reporting she is pending d/c to [address] [place] [address] today, pending insurance. Pt donned B socks seated in chair with legs crossed and setup assist (for item retrieval). Pt performed functional mobility household distances around room using RW and CGA for safety with min vc for walker placement and keeping walker on ground; pt picked up dirty linens and returned to linen cart x2 trials. Pt then walked to bathroom and performed toilet transfer to standard commode using grab bar on left side only with CGA and min vc for safe hand placement; pt simulated toileting/hygiene with SBA. Standing at sink, pt performed hand hygiene with CGA/SBA.  Pt returned to bedside chair and completed BUE TherEX (see below), tolerating well. Pt left with all needs within reach and Nursing in room. EDUCATION Pt was educated on importance of BUE TherEx to improve strength/ROM in prep for ADLs. Progression toward goals: 
[x]          Improving appropriately and progressing toward goals 
[]          Improving slowly and progressing toward goals 
[]          Not making progress toward goals and plan of care will be adjusted PLAN: 
Patient continues to benefit from skilled intervention to address the above impairments. Continue treatment per established plan of care. Discharge Recommendations:  Drew Forte Further Equipment Recommendations for Discharge:  bedside commode SUBJECTIVE:  
Patient stated I don't know if I can remember all of that; my brain is trying to connect the lines.  OBJECTIVE DATA SUMMARY: 
  
G CODES:  Self Care  Current  CJ= 20-39%  Goal  CI= 1-19%. Cognitive/Behavioral Status: 
Neurologic State: Alert Orientation Level: Oriented X4 Cognition: Follows commands Safety/Judgement: Fall prevention Functional Mobility and Transfers for ADLs: 
 Transfers: 
Sit to Stand: Stand-by assistance Toilet Transfer : Contact guard assistance (Standard commode ) Bathroom Mobility: Contact guard assistance Balance: 
Sitting: Intact Sitting - Static: Good (unsupported) Sitting - Dynamic: Good (unsupported) Standing: Impaired; With support ADL Intervention: 
Grooming Grooming Assistance: Contact guard assistance Washing Hands: Contact guard assistance (Standing at sink) Upper Body Bathing Bathing Assistance: Supervision/set-up (Pt cleaned adhesive off left UE ) Position Performed: Seated in chair Upper Body Dressing Assistance Hospital Gown: Supervision/ set-up (donned) Lower Body Dressing Assistance Socks: Supervision/set-up Leg Crossed Method Used: Yes Position Performed: Seated in chair Therapeutic Exercise BUE TherEx: seated in chair, pt completed x15 reps of the following, shoulder elevation/depression, scapula retraction/protraction, shoulder flexion/extension, shoulder abd/adduction with elbow flexion/extension (at chest level), and x25 reps bicep curls. Pt encouraged to complete one more set of each TherEx Independently (at least 3 if she can remember), pt verbalized understanding. Pain: 
Pt did not report pain during session. Activity Tolerance:   
Fair Please refer to the flowsheet for vital signs taken during this treatment. After treatment:  
[x]  Patient left in no apparent distress sitting up in chair 
[]  Patient left in no apparent distress in bed 
[x]  Call bell left within reach 
[]  Nursing notified 
[x]  Nursing present 
[]  Bed alarm activated MELANIA Cervantes Time Calculation: 32 mins

## 2018-09-18 NOTE — PROGRESS NOTES
Received auth from insurance. Notified pt,  pts dtr to transport . Envelope in nurses station. fax'd scripts. Placed scripts and dc summary in envelope, reminded nursekosta to give to pt to take to facility. Care Management Interventions PCP Verified by CM: Yes 
Palliative Care Criteria Met (RRAT>21 & CHF Dx)?: No 
Mode of Transport at Discharge: Other (see comment) Transition of Care Consult (CM Consult): Discharge Planning Current Support Network: Lives Alone Confirm Follow Up Transport: Family Plan discussed with Pt/Family/Caregiver: Yes Discharge Location Discharge Placement: Skilled nursing facility

## 2018-09-18 NOTE — PROGRESS NOTES
[facility] [employer] pt care from [address]. Pt in bed, alert and oriented x 4. Not in any form of distress. Denies pain. Frequent use items and call bell within reach. Verbalized understanding to call for assistance. Bed locked in lowest position.

## 2018-09-18 NOTE — PROGRESS NOTES
Assumed care of patient from hospitals (offgoing nurse). Patient is awake in bed, alert and oriented x4, no complaints of pain. Bed locked and in lowest position, encouraged to call for assistance. 1500- Authorization obtained from insurance for patient to go to [facility] and [address] [place] [address]. Patient calling daughter to pick her up and take her to SNF.  
1600- Patient discharge to [facility] and Rehab. Escorted to front entrance via wheelchair with RN into daughters vehicle. Daughter to transport. Has discharge instructions along with discharge packet for SNF with prescriptions.

## 2018-09-18 NOTE — DISCHARGE SUMMARY
[facility]    Discharge Summary    Patient: Medardo Dowling MRN: [id number]  CSN: [id number]    YOB: 1960  Age: 60 y.o.   Sex: female    DOA: 9/9/2018 LOS:  LOS: 9 days   Discharge Date: 9/182018     Admission Diagnoses: Tachycardia  Status epilepticus [facility])    Discharge Diagnoses:    Problem List as of 9/18/2018  Date Reviewed: 9/9/2018          Codes Class Noted - Resolved    SAH (subarachnoid hemorrhage) ([facility] ICD-10-CM: I60.9  ICD-9-CM: 125  9/14/2018 - Present        PRES (posterior reversible encephalopathy syndrome) ICD-10-CM: I67.83  ICD-9-CM: 348.39  9/14/2018 - Present        * (Principal)Status epilepticus ([facility] ICD-10-CM: G40.901  ICD-9-CM: 345.3  9/9/2018 - Present        Anxiety ICD-10-CM: F41.9  ICD-9-CM: 300.00  9/9/2018 - Present        GERD (gastroesophageal reflux disease) ICD-10-CM: K21.9  ICD-9-CM: 530.81  9/9/2018 - Present    Overview Signed 9/9/2018 10:05 AM by Jared Ware DO     H/O--NOT ON MEDICATION AT PRESENT             Acute kidney failure ([facility] ICD-10-CM: N17.9  ICD-9-CM: 584.9  9/9/2018 - Present        Erythrocytosis ICD-10-CM: D75.1  ICD-9-CM: 289.0  9/9/2018 - Present        Lymphocytosis ICD-10-CM: D72.820  ICD-9-CM: 288.61  9/9/2018 - Present        Positive urine drug screen ICD-10-CM: R82.5  ICD-9-CM: 796.0  9/9/2018 - Present    Overview Signed 9/9/2018 11:06 AM by Obey Christine MD     [facility]             Status post laparoscopic cholecystectomy ICD-10-CM: Z90.49  ICD-9-CM: V45.89  10/22/2013 - Present        DJD (degenerative joint disease) ICD-10-CM: M19.90  ICD-9-CM: 715.90  9/27/2013 - Present        HTN (hypertension) ICD-10-CM: I10  ICD-9-CM: 401.9  9/27/2013 - Present        COPD (chronic obstructive pulmonary disease) ([facility] ICD-10-CM: J44.9  ICD-9-CM: 496  9/27/2013 - Present        RESOLVED: Tachycardia ICD-10-CM: R00.0  ICD-9-CM: 785.0  9/9/2018 - 9/14/2018        RESOLVED: Postictal state ([facility] ICD-10-CM: R56.9  ICD-9-CM: 780.39  9/9/2018 - 9/14/2018              Discharge Condition: Stable    Discharge To: Rehab    Consults: Hospitalist, Neurology and Pulmonary/Critical [facility] Course: 61 y/o  female with hx of COPD, anxiety, HTN, GERD; presented to ED on 9/9 with c/o severe H/A and muscle spasms. Pt was stable in ED with teleneuro consult with plans for observation and MRI. Pt started to have actively seizure w/ tonic-clonic activity and eye deviation to the left. Ativan 8mg given, but refractory. Keppra load refractory. Depakote load of 1500mg; Phenobarbital 1500 mg given. Neurology consulted. LP completed-CSF negative tubes 1&4. CT head negative. History of isolated WBCs. Recently c/o left sided numbness per pt's daughter-had been prescribed lyrica and flexeril. Takes xanax nightly. No h/o seizure. SBP elevated in ED. MRI c/w small area of subarachnoid hemorrhage on cortical surface right frontal lobe plus patchy brain edema, possible 2/2 malignant hypertension. UDS + benzo, thc. Fungal prep negative. Cryptococcal antigen negative. Tube 1: 0 WBCs and 21 RBCs. Tube 4: 0 WBCs and 111 RBCs. Protein 40, glucose 71. EEG completed on 9/10- per neurology's reading, abnormal recording revealing the presence of moederate slowing of background activity consistent with encephalopathy. Nonspecific finding and may be due to toxic, metabolic or inflammatory causes, no epileptiform activity noted. Acyclovir stopped on 9/10. Per EMR, on 9/11/18 pt noted to follow commands, opens eyes to name and able to verbalize. Restless-sitter ordered. Pending CSF studies paraneoplastic profile, NMDA receptor antibodies, HSV1-2 PCR. WBCs w/ slight improvement. Urinary retention-straight cath X2, bladder scan prn, place ken if needed (but would overall try to avoid w/ elevated white count and unclear etiology?). Will need PT/OT/Speech when improved for discharge planning. Nutrition via NGT. Echo ordered, but not yet completed.  Continue to follow neuro status-slowly improving. Patient following commands, but still restless and states she is \"falling out of bed\". Speech still garbled and hard to understand. Wbc's slight improvement; afebrile. Blood cultures NGTD. HSV 1/2 negative. Other serologies pending. She was transferred out of the ICU on 9/13. NGT and ken removed. Swallow eval completed. MRI pending. Tera mackay. GI consulted for NG tube w/ red aspirate. Pt started on oral Protonix on 9/14. CT head 9/13 showed resolution of SAH. MRI attempted again on 9/14 - showed improvement in patchy abnormal signal seen in bilateral parietal and occipital lobes, as well as seen in posterior left frontal lobe. No new areas of abnormal signal noted. Previously noted leptomeningeal enhancement in the parietal and occipital regions was improved. Subtle suggestion of blood products seen in the posterior right frontal sulcus towards the vertex; No new hemorrhage noted. Pt still c/o headache, relieved with Norco.  No seizure activity noted. Per neurology- continue Amlodopine, CCB's recommended for treatment of RCV's, do NOT restart Lexapro or other serotonergic medications, this is a known trigger for RCV's. Do not use Tramadol for headache's as tramadol can trigger seizures. Continue Vimpat 100 mg oral BID and Depakote 750 mg oral TID. Check Depakote levels on a weekly basis, monitor LFT's and ammonia occasionally on Depakote. She is appropriate for d/c to rehab, to follow up with PCP within one week and with neurology, Dr. Lizett Padilla within one month. Physical Exam:  General appearance: alert, cooperative, no distress  Head: Normocephalic, without obvious abnormality, atraumatic  Lungs: clear to auscultation bilaterally  Heart: regular rate and rhythm, S1, S2 normal, no murmur, click, rub or gallop  Abdomen: soft, non-tender.  Bowel sounds normal. No masses,  no organomegaly  Extremities: extremities normal, atraumatic, no cyanosis or edema  Skin: Skin color, texture, turgor normal. No rashes or lesions  Neurologic: Grossly normal  PSY: Mood and affect normal, appropriately behaved    Significant Diagnostic Studies: labs:   Recent Results (from the past 24 hour(s))   GLUCOSE, POC    Collection Time: 09/17/18  8:01 AM   Result Value Ref Range    Glucose (POC) 111 (H) 70 - 110 mg/dL   GLUCOSE, POC    Collection Time: 09/17/18 11:21 AM   Result Value Ref Range    Glucose (POC) 135 (H) 70 - 110 mg/dL         Discharge Medications:     Current Discharge Medication List      START taking these medications    Details   amLODIPine (NORVASC) 10 mg tablet Take 1 Tab by mouth daily. Qty: 30 Tab, Refills: 0      divalproex DR (DEPAKOTE) 250 mg tablet Take 3 Tabs by mouth three (3) times daily. Qty: 270 Tab, Refills: 0      lacosamide (VIMPAT) 100 mg tab tablet Take 1 Tab by mouth two (2) times a day. Max Daily Amount: 200 mg. Qty: 60 Tab, Refills: 0    Associated Diagnoses: Status epilepticus ([address]      hydroCHLOROthiazide (HYDRODIURIL) 50 mg tablet Take 1 Tab by mouth daily. Qty: 30 Tab, Refills: 0      pantoprazole (PROTONIX) 40 mg tablet Take 1 Tab by mouth Before breakfast and dinner. Qty: 60 Tab, Refills: 0         CONTINUE these medications which have CHANGED    Details   HYDROcodone-acetaminophen (NORCO) 5-325 mg per tablet Take 1 Tab by mouth every four (4) hours as needed. Max Daily Amount: 6 Tabs. Indications: Pain  Qty: 18 Tab, Refills: 0    Associated Diagnoses: PRES (posterior reversible encephalopathy syndrome)         CONTINUE these medications which have NOT CHANGED    Details   fluticasone-salmeterol (ADVAIR DISKUS) 250-50 mcg/dose diskus inhaler Take 1 Puff by inhalation every twelve (12) hours. omega-3 fatty acids-vitamin e (FISH OIL) 1,000 mg cap Take 1 Cap by mouth.          STOP taking these medications       escitalopram oxalate (LEXAPRO) 20 mg tablet Comments:   Reason for Stopping:         pregabalin (LYRICA) 50 mg capsule Comments:   Reason for Stopping:         ondansetron (ZOFRAN ODT) 8 mg disintegrating tablet Comments:   Reason for Stopping:         cyclobenzaprine (FLEXERIL) 10 mg tablet Comments:   Reason for Stopping:         losartan-hydrochlorothiazide (HYZAAR) 100-25 mg per tablet Comments:   Reason for Stopping:         ALPRAZolam (XANAX) 0.5 mg tablet Comments:   Reason for Stopping:               Activity: Activity as tolerated and PT/OT Eval and Treat    Diet: Cardiac Diet, Consistent Carb [id number] kcal    Wound Care: None needed    Follow-up: Follow up with PCP within one week. Follow up with neurology, Dr. Ana De Guzman within one month.     Discharge time: > 35 mins  Chris Peterson NP  9/18/2018, 7:29 AM

## 2018-10-15 LAB
BACTERIA SPEC CULT: NORMAL
FUNGUS SMEAR,FNGSMR: NORMAL
SERVICE CMNT-IMP: NORMAL

## 2018-10-19 ENCOUNTER — HOSPITAL ENCOUNTER (OUTPATIENT)
Dept: ULTRASOUND IMAGING | Age: 58
Discharge: HOME | End: 2018-10-19
Attending: PSYCHIATRY & NEUROLOGY

## 2018-10-19 DIAGNOSIS — I16.1 HYPERTENSIVE EMERGENCY: ICD-10-CM

## 2018-10-26 LAB
ACID FAST STN SPEC: NEGATIVE
MYCOBACTERIUM SPEC QL CULT: NEGATIVE
SPECIMEN PREPARATION: NORMAL
SPECIMEN SOURCE: NORMAL

## 2018-11-01 ENCOUNTER — HOSPITAL ENCOUNTER (OUTPATIENT)
Dept: VASCULAR SURGERY | Age: 58
Discharge: HOME | End: 2018-11-01
Attending: PSYCHIATRY & NEUROLOGY
Payer: COMMERCIAL

## 2018-11-01 DIAGNOSIS — Q27.1 CONGENITAL RENAL ARTERY STENOSIS: ICD-10-CM

## 2018-11-01 PROCEDURE — 93975 VASCULAR STUDY: CPT

## 2018-11-02 LAB
ABDOMINAL PROX AORTA VEL: 80 CM/S
LEFT INTERLOBAR EDV: 11.7 CM/S
LEFT INTERLOBAR PSV: 45.5 CM/S
LEFT KIDNEY ARCUATE ARTERY MEDIAL EDV: 6.8 CM/S
LEFT KIDNEY ARCUATE ARTERY MEDIAL PSV: 22.9 CM/S
LEFT KIDNEY LENGTH: 11.1 CM
LEFT KIDNEY MED ARCUATE RI: 0.7
LEFT KIDNEY WIDTH: 5.6 CM
LEFT RENAL DIST DIAS: 20.4 CM/S
LEFT RENAL DIST RAR: 0.78
LEFT RENAL DIST RI: 0.67
LEFT RENAL DIST SYS: 62.5 CM/S
LEFT RENAL LOWER POLE ACCEL TIME: 0.4 S
LEFT RENAL MID DIAS: 30 CM/S
LEFT RENAL MID RAR: 1.37
LEFT RENAL MID RI: 0.73
LEFT RENAL MID SYS: 109.2 CM/S
LEFT RENAL ORIGIN DIAS: 33.7 CM/S
LEFT RENAL ORIGIN RAR: 1.39
LEFT RENAL ORIGIN RI: 0.7
LEFT RENAL ORIGIN SYS: 111.4 CM/S
LEFT RENAL RAR: 1.3
RIGHT INTERLOBAR EDV: 13.8 CM/S
RIGHT INTERLOBAR PSV: 49.1 CM/S
RIGHT KIDNEY ARCUATE ARTERY MEDIAL EDV: 6.7 CM/S
RIGHT KIDNEY ARCUATE ARTERY MEDIAL PSV: 23.5 CM/S
RIGHT KIDNEY LENGTH: 10.9 CM
RIGHT KIDNEY MED ARCUATE RI: 0.71
RIGHT KIDNEY WIDTH: 5.3 CM
RIGHT RENAL DIST DIAS: 16.5 CM/S
RIGHT RENAL DIST RAR: 0.76
RIGHT RENAL DIST RI: 0.73
RIGHT RENAL DIST SYS: 61 CM/S
RIGHT RENAL LOWER POLE ACCEL TIME: 0.3 S
RIGHT RENAL MID DIAS: 21.8 CM/S
RIGHT RENAL MID RAR: 1.09
RIGHT RENAL MID RI: 0.75
RIGHT RENAL MID SYS: 87 CM/S
RIGHT RENAL ORIGIN DIAS: 21.8 CM/S
RIGHT RENAL ORIGIN RAR: 1.19
RIGHT RENAL ORIGIN RI: 0.77
RIGHT RENAL ORIGIN SYS: 95.1 CM/S
RIGHT RENAL RAR: 1.1

## 2018-11-30 ENCOUNTER — HOSPITAL ENCOUNTER (OUTPATIENT)
Dept: MAMMOGRAPHY | Age: 58
Discharge: HOME | End: 2018-11-30
Attending: FAMILY MEDICINE
Payer: COMMERCIAL

## 2018-11-30 DIAGNOSIS — Z12.31 VISIT FOR SCREENING MAMMOGRAM: ICD-10-CM

## 2018-11-30 PROCEDURE — 77063 BREAST TOMOSYNTHESIS BI: CPT

## 2019-02-28 ENCOUNTER — HOSPITAL ENCOUNTER (OUTPATIENT)
Dept: GENERAL RADIOLOGY | Age: 59
Discharge: HOME | End: 2019-02-28
Attending: FAMILY MEDICINE
Payer: COMMERCIAL

## 2019-02-28 DIAGNOSIS — M25.561 RIGHT KNEE PAIN: ICD-10-CM

## 2019-02-28 PROCEDURE — 73564 X-RAY EXAM KNEE 4 OR MORE: CPT

## 2019-07-08 ENCOUNTER — HOSPITAL ENCOUNTER (OUTPATIENT)
Dept: NON INVASIVE DIAGNOSTICS | Age: 59
Discharge: HOME | End: 2019-07-08
Attending: FAMILY MEDICINE
Payer: COMMERCIAL

## 2019-07-08 ENCOUNTER — HOSPITAL ENCOUNTER (OUTPATIENT)
Dept: NUCLEAR MEDICINE | Age: 59
Discharge: HOME | End: 2019-07-08
Attending: FAMILY MEDICINE
Payer: COMMERCIAL

## 2019-07-08 VITALS
BODY MASS INDEX: 38.15 KG/M2 | WEIGHT: 229 LBS | DIASTOLIC BLOOD PRESSURE: 79 MMHG | HEIGHT: 65 IN | SYSTOLIC BLOOD PRESSURE: 132 MMHG

## 2019-07-08 VITALS — DIASTOLIC BLOOD PRESSURE: 60 MMHG | BODY MASS INDEX: 39 KG/M2 | SYSTOLIC BLOOD PRESSURE: 148 MMHG | WEIGHT: 234.38 LBS

## 2019-07-08 DIAGNOSIS — R94.31 ABNORMAL EKG: ICD-10-CM

## 2019-07-08 DIAGNOSIS — R60.9 EDEMA: ICD-10-CM

## 2019-07-08 DIAGNOSIS — R06.02 SOB (SHORTNESS OF BREATH): ICD-10-CM

## 2019-07-08 LAB
ECHO AO ROOT DIAM: 2.69 CM
ECHO AV PEAK GRADIENT: 0 MMHG
ECHO AV PEAK VELOCITY: 0 CM/S
ECHO LA VOL 2C: 42.33 ML (ref 22–52)
ECHO LA VOL 4C: 29.95 ML (ref 22–52)
ECHO LA VOLUME INDEX A2C: 20 ML/M2 (ref 16–28)
ECHO LA VOLUME INDEX A4C: 14.15 ML/M2 (ref 16–28)
ECHO LV EDV A2C: 80.1 ML
ECHO LV EDV A4C: 70.2 ML
ECHO LV EDV BP: 75.1 ML (ref 56–104)
ECHO LV EDV INDEX A4C: 33.2 ML/M2
ECHO LV EDV INDEX BP: 35.5 ML/M2
ECHO LV EDV NDEX A2C: 37.9 ML/M2
ECHO LV EJECTION FRACTION A2C: 55 %
ECHO LV EJECTION FRACTION A4C: 62 %
ECHO LV EJECTION FRACTION BIPLANE: 58.3 % (ref 55–100)
ECHO LV ESV A2C: 35.7 ML
ECHO LV ESV A4C: 26.6 ML
ECHO LV ESV BP: 31.3 ML (ref 19–49)
ECHO LV ESV INDEX A2C: 16.9 ML/M2
ECHO LV ESV INDEX A4C: 12.6 ML/M2
ECHO LV ESV INDEX BP: 14.8 ML/M2
ECHO LV INTERNAL DIMENSION DIASTOLIC: 3.89 CM (ref 3.9–5.3)
ECHO LV INTERNAL DIMENSION SYSTOLIC: 2.39 CM
ECHO LV IVSD: 0.97 CM (ref 0.6–0.9)
ECHO LV MASS 2D: 126.5 G (ref 67–162)
ECHO LV MASS INDEX 2D: 59.8 G/M2 (ref 43–95)
ECHO LV POSTERIOR WALL DIASTOLIC: 0.92 CM (ref 0.6–0.9)
ECHO LVOT DIAM: 2.16 CM
ECHO LVOT PEAK GRADIENT: 3.7 MMHG
ECHO LVOT PEAK VELOCITY: 96.57 CM/S
ECHO MV A VELOCITY: 81.11 CM/S
ECHO MV AREA PHT: 5.6 CM2
ECHO MV E DECELERATION TIME (DT): 136.5 MS
ECHO MV E VELOCITY: 57.18 CM/S
ECHO MV E/A RATIO: 0.7
ECHO MV PRESSURE HALF TIME (PHT): 39.6 MS
STRESS ANGINA INDEX: 0
STRESS ESTIMATED WORKLOAD: 7 METS
STRESS EXERCISE DUR MIN: NORMAL MIN:SEC
STRESS SR DUKE TREADMILL SCORE: 6
STRESS ST DEPRESSION: 0 MM
STRESS ST ELEVATION: 0 MM
STRESS TARGET HR: 160 BPM

## 2019-07-08 PROCEDURE — A9500 TC99M SESTAMIBI: HCPCS

## 2019-07-08 PROCEDURE — 93306 TTE W/DOPPLER COMPLETE: CPT

## 2019-07-08 PROCEDURE — 93017 CV STRESS TEST TRACING ONLY: CPT

## 2019-10-07 ENCOUNTER — HOSPITAL ENCOUNTER (OUTPATIENT)
Dept: GENERAL RADIOLOGY | Age: 59
Discharge: HOME | End: 2019-10-07
Attending: INTERNAL MEDICINE
Payer: COMMERCIAL

## 2019-10-07 DIAGNOSIS — N95.9 MENOPAUSAL DISORDER: ICD-10-CM

## 2019-10-07 PROCEDURE — 77080 DXA BONE DENSITY AXIAL: CPT

## 2020-02-01 NOTE — PROGRESS NOTES
[facility] Progress Note I have reviewed the flowsheet and previous days notes. Events, vitals, medications and notes from last 24 hours reviewed. Care plan discussed with staff and on multidisciplinary rounds. Subjective: 
9/10/2018 Pt is moaning, withdraws to touch. Does not answer questions or follows commands. Impression and Plan Status epilepticus - Pt has been evaluated by neurology. Had a LP done. MRI brain shows - Possible PRES, Minimal subarachnoid and/or subcutaneous hemorrhage along the high right frontal convexity, no acute infarct. Pt had an EEG done today, results pending. Pt is on Depacon and Valproate. Will defer further mgt to Neurology. Pt is also on acyclovir, defer this to neurology as well HTN - Goal SBP is <140mmHg per Neurology. Pt is now on labetalol and Hydralazine prn. Also on HCTZ 
COPD - Pt is on Brovana and Pulmicort nebs Leucocytosis - Lower today than yesterday, pt is afebrile. U/a is negative and Cxr does not show acute inf. Cont to monitor Hx of Anxiety DVT proph - SCD 
 
OTHER: 
Glycemic Control. Glucose stabilizer per ICU protocol when on insulin drip. Maintain blood glucose 140-180. Replace electrolytes per ICU electrolyte replacement protocol HOB >=30 degree elevation all the time. Aggressive pulmonary toileting. Incentive spirometry when appropriate. Aspiration precautions. Ken bundle followed, remove ken catheter when not critically ill. PT/OT eval and treat. OOB/IS when appropriate. Quality Care: Stress ulcer prophylaxis, DVT prophylaxis, HOB elevated, Infection control all reviewed and addressed. Events and notes from last 24 hours reviewed. Care plan discussed with nursing. D/w patient's family above medical problems and answered all questions to their satisfaction. CC TIME: >35 min Medication Reviewed: Allergies Allergen Reactions  Lisinopril Cough Past Medical History: Diagnosis Date  Abdominal pain  Anxiety  COPD (chronic obstructive pulmonary disease) ([facility])  DJD (degenerative joint disease) of cervical spine  Elevated cholesterol  GERD (gastroesophageal reflux disease) H/O--NOT ON MEDICATION AT PRESENT  
 Hypertension  Nausea and vomiting Past Surgical History:  
Procedure Laterality Date  EXCISION TUMOR SOFT TISSUE FOOT/TOE SUBQ <1.5CM  2011  
 2 mccartney neuroma right foot  HX CARPAL TUNNEL RELEASE  2002  HX CHOLECYSTECTOMY  10/14/2013  HX HYSTERECTOMY  1994  
 vaginal (ovaries still in) [address]  [place] REMOVAL 1ST/CERVICAL RIB  1984  
 right side 1st rib  REMOVAL OF RIB(S)  1976  
 left side 1st rib Social History Substance Use Topics  Smoking status: Former Smoker  Smokeless tobacco: Never Used Comment: stop 4/2013; uses electronic cigarette  Alcohol use No  
  
Family History Problem Relation Age of Onset  Heart Disease Mother  Hypertension Mother  Diabetes Mother  Hypertension Maternal Aunt  Diabetes Maternal Aunt  Thyroid Disease Sister Prior to Admission medications Medication Sig Start Date End Date Taking? Authorizing Provider  
escitalopram oxalate (LEXAPRO) 20 mg tablet Take 20 mg by mouth daily. Andrew Treadwell MD  
pregabalin (LYRICA) 50 mg capsule Take 50 mg by mouth three (3) times daily. Andrew Treadwell MD  
HYDROcodone-acetaminophen (NORCO) 5-325 mg per tablet Take 1 Tab by mouth every eight (8) hours as needed for Pain. 10/10/13   Ewa Rasmussen PA-C  
ondansetron (ZOFRAN ODT) 8 mg disintegrating tablet Take 1 Tab by mouth every eight (8) hours as needed for Nausea. 10/10/13   Ewa Rasmussen PA-C  
fluticasone-salmeterol (ADVAIR DISKUS) 250-50 mcg/dose diskus inhaler Take 1 Puff by inhalation every twelve (12) hours. Historical Provider  
omega-3 fatty acids-vitamin e (FISH OIL) 1,000 mg cap Take 1 Cap by mouth. Historical Provider cyclobenzaprine (FLEXERIL) 10 mg tablet Take  by mouth three (3) times daily as needed. Historical Provider  
losartan-hydrochlorothiazide (HYZAAR) 100-25 mg per tablet Take 1 Tab by mouth daily. Historical Provider ALPRAZolam (XANAX) 0.5 mg tablet Take  by mouth. Historical Provider Current Facility-Administered Medications Medication Dose Route Frequency  labetalol (NORMODYNE;TRANDATE) 20 mg/4 mL (5 mg/mL) injection  losartan (COZAAR) tablet 100 mg  100 mg Oral DAILY  acyclovir sodium (ZOVIRAX) 570 mg in 0.9% sodium chloride 100 mL IVPB  10 mg/kg (Ideal) IntraVENous Q8H  
 dextrose 5% and 0.9% NaCl infusion  100 mL/hr IntraVENous CONTINUOUS  
 sodium chloride (NS) flush 5-10 mL  5-10 mL IntraVENous Q8H  
 sodium chloride (NS) flush 5-10 mL  5-10 mL IntraVENous Q8H  
 arformoterol (BROVANA) neb solution 15 mcg  15 mcg Nebulization BID RT  
 budesonide (PULMICORT) 500 mcg/2 ml nebulizer suspension  500 mcg Nebulization BID RT  
 pantoprazole (PROTONIX) 40 mg in sodium chloride 0.9% 10 mL injection  40 mg IntraVENous DAILY  PHENobarbital (LUMINAL) injection 130 mg  130 mg IntraVENous QPM  
 valproate (DEPACON) 750 mg in 0.9% sodium chloride 50 mL IVPB  750 mg IntraVENous Q6H Peripheral Intravenous Line: 
Peripheral IV 09/09/18 Right Antecubital (Active) Site Assessment Clean, dry, & intact 9/10/2018  7:00 AM  
Phlebitis Assessment 0 9/10/2018  7:00 AM  
Infiltration Assessment 0 9/10/2018  7:00 AM  
Dressing Status Clean, dry, & intact 9/10/2018  7:00 AM  
Dressing Type Transparent 9/10/2018  7:00 AM  
Hub Color/Line Status Green;Patent;Capped 9/10/2018  7:00 AM  
Action Taken Open ports on tubing capped 9/10/2018  7:00 AM  
Alcohol Cap Used Yes 9/10/2018  7:00 AM  
   
Peripheral IV 09/09/18 Left Antecubital (Active) Site Assessment Clean, dry, & intact 9/10/2018  7:00 AM  
Phlebitis Assessment 0 9/10/2018  7:00 AM  
Infiltration Assessment 0 9/10/2018  7:00 AM  
 Dressing Status Clean, dry, & intact 9/10/2018  7:00 AM  
Dressing Type Transparent 9/10/2018  7:00 AM  
Hub Color/Line Status Green;Patent; Infusing 9/10/2018  7:00 AM  
Action Taken Open ports on tubing capped 9/10/2018  7:00 AM  
Alcohol Cap Used Yes 9/10/2018  7:00 AM  
   
Peripheral IV 18 Right Hand (Active) Site Assessment Clean, dry, & intact 9/10/2018  7:00 AM  
Phlebitis Assessment 0 9/10/2018  7:00 AM  
Infiltration Assessment 0 9/10/2018  7:00 AM  
Dressing Status Clean, dry, & intact 9/10/2018  7:00 AM  
Dressing Type Transparent 9/10/2018  7:00 AM  
Hub Color/Line Status Pink;Patent;Capped 9/10/2018  7:00 AM  
Action Taken Open ports on tubing capped 9/10/2018  7:00 AM  
Alcohol Cap Used Yes 9/10/2018  7:00 AM  
 
Drain(s): 
Nasogastric Tube 18 (Active) Site Assessment Clean, dry, & intact 9/10/2018  7:30 AM  
Dressing Status Clean, dry, & intact 9/10/2018  7:30 AM  
G Port Status Intermittent Suction 9/10/2018  7:30 AM  
External Insertion Evens (cms) 60 cms 9/10/2018  7:30 AM  
Action Taken Placement verified (comment) 9/10/2018  7:30 AM  
Drainage Description Nathalie Scriver 9/10/2018  7:30 AM  
Gastric Residual (mL) 0 ml 9/10/2018  7:30 AM  
Drainage Chamber Level (ml) 0 ml 9/10/2018  7:00 AM  
Output (ml) 0 ml 9/10/2018  7:30 AM  
 
Objective: 
Vital Signs:   
Visit Vitals  /68  Pulse 86  Temp 98.7 °F (37.1 °C)  Resp 22  Wt 104.1 kg (229 lb 8 oz)  SpO2 97%  BMI 38.19 kg/m2 O2 Device: Nasal cannula O2 Flow Rate (L/min): 4 l/min Temp (24hrs), Av.5 °F (36.9 °C), Min:98 °F (36.7 °C), Max:99.1 °F (37.3 °C) Intake/Output:  
Last shift:      09/10 07 - 09/10 1900 In: 300 [I.V.:300] Out: 100 [Urine:100] Last 3 shifts: 1901 - 09/10 0700 In: 0261 [I.V.:2593] Out: Manuel Decker [[facility]] Intake/Output Summary (Last 24 hours) at 09/10/18 1105 Last data filed at 09/10/18 1000 Gross per 24 hour Intake             2893 ml Output             2037 ml Net              856 ml Last 3 Recorded Weights in this Encounter 09/09/18 0500 09/10/18 3878 Weight: 111.1 kg (245 lb) 104.1 kg (229 lb 8 oz) Physical Exam:  
 
General/Neurology: Sleepy, withdraws to touch Head:   Normocephalic, without obvious abnormality Eye:   no scleral icterus, no pallor Oral:   Mucus membranes moist 
Neck:   Supple Lung:   B/l air entry is fair Heart:   Regular rate & rhythm. S1 S2 present. Abdomen/: Soft, non tender, BS +nt Extremities:  No pedal edema Skin:   Dry, intact Data: 
   
Recent Results (from the past 24 hour(s)) VALPROIC ACID Collection Time: 09/09/18  2:36 PM  
Result Value Ref Range Valproic acid 64 50 - 100 ug/ml URINALYSIS W/ RFLX MICROSCOPIC Collection Time: 09/09/18 11:20 PM  
Result Value Ref Range Color YELLOW Appearance CLEAR Specific gravity 1.018 1.005 - 1.030    
 pH (UA) 7.0 5.0 - 8.0 Protein NEGATIVE  NEG mg/dL Glucose 100 (A) NEG mg/dL Ketone TRACE (A) NEG mg/dL Bilirubin NEGATIVE  NEG Blood NEGATIVE  NEG Urobilinogen 0.2 0.2 - 1.0 EU/dL Nitrites NEGATIVE  NEG Leukocyte Esterase TRACE (A) NEG URINE MICROSCOPIC ONLY Collection Time: 09/09/18 11:20 PM  
Result Value Ref Range WBC 0 to 3 0 - 4 /hpf  
 RBC NEGATIVE  0 - 5 /hpf Epithelial cells FEW 0 - 5 /lpf Bacteria NEGATIVE  NEG /hpf  
CBC WITH AUTOMATED DIFF Collection Time: 09/10/18  3:35 AM  
Result Value Ref Range WBC 17.4 (H) 4.6 - 13.2 K/uL  
 RBC 4.30 4.20 - 5.30 M/uL  
 HGB 12.9 12.0 - 16.0 g/dL HCT 38.5 35.0 - 45.0 % MCV 89.5 74.0 - 97.0 FL  
 MCH 30.0 24.0 - 34.0 PG  
 MCHC 33.5 31.0 - 37.0 g/dL  
 RDW 13.5 11.6 - 14.5 % PLATELET 164 283 - 077 K/uL MPV 10.1 9.2 - 11.8 FL  
 NEUTROPHILS 69 40 - 73 % LYMPHOCYTES 23 21 - 52 % MONOCYTES 7 3 - 10 % EOSINOPHILS 1 0 - 5 % BASOPHILS 0 0 - 2 %  
 ABS. NEUTROPHILS 12.0 (H) 1.8 - 8.0 K/UL ABS. LYMPHOCYTES 4.0 (H) 0.9 - 3.6 K/UL  
 ABS. MONOCYTES 1.2 0.05 - 1.2 K/UL  
 ABS. EOSINOPHILS 0.1 0.0 - 0.4 K/UL  
 ABS. BASOPHILS 0.0 0.0 - 0.1 K/UL  
 DF AUTOMATED Chemistry Recent Labs  
   09/09/18 
 8223 GLU  162* NA  139  
K  5.0  
CL  104 CO2  21 BUN  19* CREA  1.49* CA  10.3* MG  2.1 AGAP  14 BUCR  13  
AP  131* TP  8.2 ALB  4.4  
GLOB  3.8 AGRAT  1.2  
 
 
CBC w/Diff Recent Labs  
   09/10/18 
 0335  09/09/18 
 0445 WBC  17.4*  24.1*  
RBC  4.30  5.55* HGB  12.9  17.0*  
HCT  38.5  49.9*  
PLT  275  389 GRANS  69  28* LYMPH  23  59* EOS  1  3 Micro Recent Labs  
   09/09/18 
 1058 CULT  NO GROWTH AFTER 20 HOURS  PENDING Recent Labs  
   09/09/18 
 1058 CULT  NO GROWTH AFTER 20 HOURS  PENDING  
 
 
CT (Most Recent) Results from Hospital Encounter encounter on 09/09/18 CT HEAD WO CONT Narrative EXAM: CT head INDICATION: Severe headache COMPARISON: Head CT performed 9/5/2018 TECHNIQUE: Axial CT imaging of the head was performed without intravenous 
contrast. 
 
One or more dose reduction techniques were used on this CT: automated exposure 
control, adjustment of the mAs and/or kVp according to patient's size, and 
iterative reconstruction techniques. The specific techniques utilized on this CT 
exam have been documented in the patient's electronic medical record.  
 
_______________ FINDINGS: Anatomic detail is examination is moderately limited by motion 
artifact which is present throughout the imaged volume. BRAIN AND POSTERIOR FOSSA: Cortical sulci volume appears stable from prior 
examination. Ventricular size and configuration is similar to prior. Basilar 
cisterns are patent. Within the limitations of motion artifact, no discrete 
intra-axial fluid collection. No mass effect or midline shift. EXTRA-AXIAL SPACES AND MENINGES: Within the limitations of motion, no discrete 
extra-axial fluid collection. CALVARIUM: No acute osseous abnormality. SINUSES: Imaged paranasal sinuses and mastoid air cells are clear. OTHER: Atherosclerotic vascular calcifications of the carotid siphons are 
present bilaterally. _______________ Impression IMPRESSION: 
 
 
1. Moderately motion limited examination. Within the limitations of motion 
artifact, no acute intracranial abnormality or significant interval change from 
recent prior scan performed 9/5/2018. Note: Preliminary report sent to the Emergency Department by the radiology 
resident at the time of the study. XR (Most Recent). CXR reviewed by me and compared with previous CXR Results from Hospital Encounter encounter on 09/09/18 XR ABD PORT  1 V Narrative Abdomen, single view COMPARISON: 4/18/2016 INDICATION: Nasogastric tube placement FINDINGS: Supine AP portable view the abdomen obtained and interpreted with 
comparison as above. Nasogastric tube in position with tip and side-port within 
the stomach body. Minor atelectatic streaks are present at the lung bases which 
are otherwise clear. There are several right upper quadrant surgical clips 
noted. No acute osseous abnormality. Impression IMPRESSION: 
1. Nasogastric tube appropriately positioned within the stomach. 2. Minor atelectasis at the lung bases. Note: Preliminary report sent to the patient care division by the radiology 
resident at the time of the study. High complexity decision making was performed during the evaluation of this patient at high risk for decompensation with multiple organ involvement Above mentioned total time spent on reviewing the case/medical record/data/notes/EMR/patient examination/documentation/coordinating care with nurse/consultants, exclusive of procedures with complex decision making performed and > 50% time spent in face to face evaluation. Anna Marie Goyal MD 
9/10/2018

## 2020-04-24 NOTE — PROGRESS NOTES
Problem: Mobility Impaired (Adult and Pediatric) Goal: *Acute Goals and Plan of Care (Insert Text) Physical Therapy Goals Initiated 9/14/2018 and to be accomplished within 7 days. 1.  Patient will complete all bed mobility with supervision/set-up in order to prepare for EOB/OOB activity. 2.  Patient will perform sit <> stand with supervision/set-up in order to prepare for OOB/gait activity. 3.  Patient will perform bed to chair transfers with minimal assistance/contact guard assist in order to promote mobility and encourage seated activity to progress towards their prior level of function. 4.  Patient will ambulate 25 feet with minimal assistance/contact guard assist using LRAD with step through gait in order to prepare for safe negotiation of their environment. Outcome: Progressing Towards Goal 
physical Therapy TREATMENT Patient: Don Hernández (2 y.o. female) Date: 9/15/2018 Diagnosis: Tachycardia Status epilepticus ([address] Status epilepticus ([address] Precautions: Fall, Seizure Chart, physical therapy assessment, plan of care and goals were reviewed. PLOF:independent, ambulatory without AD 
ASSESSMENT: 
No assistance needed for bed mobility. Moderately impulsive. No difficulty with sit to stand. Ambulated 25ft with RW, shuffling gait, very short step height and length, no LOB or path deviations. Lab arrived to get blood. Assisted pt back to supine in bed. Education: safety, RW mgmt Progression toward goals: 
[]      Improving appropriately and progressing toward goals [x]      Improving slowly and progressing toward goals 
[]      Not making progress toward goals and plan of care will be adjusted PLAN: 
Patient continues to benefit from skilled intervention to address the above impairments. Continue treatment per established plan of care. Discharge Recommendations:  Inpatient Acute Rehab Further Equipment Recommendations for Discharge:  rolling walker SUBJECTIVE:  
Patient stated I am too young to use one of these.  (referring to RW) OBJECTIVE DATA SUMMARY:  
Critical Behavior: 
Neurologic State: Alert Orientation Level: Oriented X4 Cognition: Appropriate for age attention/concentration, Follows commands Safety/Judgement: Fall prevention Functional Mobility Training: 
Bed Mobility: 
Rolling: Minimum assistance Supine to Sit: Supervision Sit to Supine: Stand-by assistance Transfers: 
Sit to Stand: Stand-by assistance Stand to Sit: Stand-by assistance Balance: 
Sitting: Intact Sitting - Static: Good (unsupported) Sitting - Dynamic: Fair (occasional) Standing: Impaired; With support Standing - Static: Good Standing - Dynamic : FairAmbulation/Gait Training: 
Distance (ft): 25 Feet (ft) Assistive Device: Gait belt;Walker, rolling Ambulation - Level of Assistance: Contact guard assistance Gait Abnormalities: Decreased step clearance;Shuffling gait Speed/Mariana: Slow;Shuffled Step Length: Right shortened;Left shortened GCODE:n/a 
Pain: 
Pre:0 Post:0 Pain Scale 1: Numeric (0 - 10) Activity Tolerance:  
Good Please refer to the flowsheet for vital signs taken during this treatment. After treatment:  
[] Patient left in no apparent distress sitting up in chair 
[x] Patient left in no apparent distress in bed 
[x] Call bell left within reach 
[] Nursing notified 
[] Caregiver present 
[] Bed alarm activated Duong Zabala PTA Time Calculation: 20 mins no fever/no nausea/no numbness/no tingling/no weakness

## 2020-06-17 ENCOUNTER — HOSPITAL ENCOUNTER (OUTPATIENT)
Dept: GENERAL RADIOLOGY | Age: 60
Discharge: HOME | End: 2020-06-17
Payer: COMMERCIAL

## 2020-06-17 DIAGNOSIS — M54.9 PAIN IN BACK: ICD-10-CM

## 2020-06-17 PROCEDURE — 72110 X-RAY EXAM L-2 SPINE 4/>VWS: CPT

## 2020-10-15 ENCOUNTER — TRANSCRIBE ORDER (OUTPATIENT)
Dept: SCHEDULING | Age: 60
End: 2020-10-15

## 2020-10-15 DIAGNOSIS — Z12.31 VISIT FOR SCREENING MAMMOGRAM: Primary | ICD-10-CM

## 2020-11-05 ENCOUNTER — HOSPITAL ENCOUNTER (OUTPATIENT)
Dept: MAMMOGRAPHY | Age: 60
Discharge: HOME | End: 2020-11-05
Attending: INTERNAL MEDICINE
Payer: COMMERCIAL

## 2020-11-05 DIAGNOSIS — Z12.31 VISIT FOR SCREENING MAMMOGRAM: ICD-10-CM

## 2020-11-05 PROCEDURE — 77063 BREAST TOMOSYNTHESIS BI: CPT

## 2021-10-20 ENCOUNTER — TRANSCRIBE ORDER (OUTPATIENT)
Dept: SCHEDULING | Age: 61
End: 2021-10-20

## 2021-10-20 DIAGNOSIS — Z12.31 VISIT FOR SCREENING MAMMOGRAM: Primary | ICD-10-CM

## 2021-11-15 ENCOUNTER — HOSPITAL ENCOUNTER (OUTPATIENT)
Dept: WOMENS IMAGING | Age: 61
Discharge: HOME | End: 2021-11-15
Attending: INTERNAL MEDICINE
Payer: COMMERCIAL

## 2021-11-15 DIAGNOSIS — Z12.31 VISIT FOR SCREENING MAMMOGRAM: ICD-10-CM

## 2021-11-15 PROCEDURE — 77063 BREAST TOMOSYNTHESIS BI: CPT

## 2022-02-25 NOTE — PROGRESS NOTES
Physical Exam  
Skin:  
 
  
 
 Reason for visit: follow-up symptom recheck     Relevant information: left testicular pain, abdominal pain left lower quadrant    Records/imaging/labs/orders: in epic    Pt called: n/a    At Rooming: standard

## 2022-03-18 PROBLEM — I67.83 PRES (POSTERIOR REVERSIBLE ENCEPHALOPATHY SYNDROME): Status: ACTIVE | Noted: 2018-09-14

## 2022-03-19 PROBLEM — D75.1 ERYTHROCYTOSIS: Status: ACTIVE | Noted: 2018-09-09

## 2022-03-19 PROBLEM — I60.9 SAH (SUBARACHNOID HEMORRHAGE) (HCC): Status: ACTIVE | Noted: 2018-09-14

## 2022-03-19 PROBLEM — K21.9 GERD (GASTROESOPHAGEAL REFLUX DISEASE): Status: ACTIVE | Noted: 2018-09-09

## 2022-03-19 PROBLEM — R82.5 POSITIVE URINE DRUG SCREEN: Status: ACTIVE | Noted: 2018-09-09

## 2022-03-19 PROBLEM — D72.820 LYMPHOCYTOSIS: Status: ACTIVE | Noted: 2018-09-09

## 2022-03-19 PROBLEM — F41.9 ANXIETY: Status: ACTIVE | Noted: 2018-09-09

## 2022-03-19 PROBLEM — N17.9 ACUTE KIDNEY FAILURE (HCC): Status: ACTIVE | Noted: 2018-09-09

## 2022-03-20 PROBLEM — G40.901 STATUS EPILEPTICUS (HCC): Status: ACTIVE | Noted: 2018-09-09

## 2022-06-04 NOTE — DISCHARGE INSTRUCTIONS
DISCHARGE SUMMARY from Nurse    PATIENT INSTRUCTIONS:    After general anesthesia or intravenous sedation, for 24 hours or while taking prescription Narcotics:  · Limit your activities  · Do not drive and operate hazardous machinery  · Do not make important personal or business decisions  · Do  not drink alcoholic beverages  · If you have not urinated within 8 hours after discharge, please contact your surgeon on call. Report the following to your surgeon:  · Excessive pain, swelling, redness or odor of or around the surgical area  · Temperature over 100.5  · Nausea and vomiting lasting longer than 4 hours or if unable to take medications  · Any signs of decreased circulation or nerve impairment to extremity: change in color, persistent  numbness, tingling, coldness or increase pain  · Any questions    What to do at Home:  Recommended activity: Activity as tolerated,     If you experience any of the following symptoms as listed under \" When should I call for help? \" in your discharge teaching  , please follow up with your Doctor and/ or call 911. *  Please give a list of your current medications to your Primary Care Provider. *  Please update this list whenever your medications are discontinued, doses are      changed, or new medications (including over-the-counter products) are added. *  Please carry medication information at all times in case of emergency situations. These are general instructions for a healthy lifestyle:    No smoking/ No tobacco products/ Avoid exposure to second hand smoke  Surgeon General's Warning:  Quitting smoking now greatly reduces serious risk to your health.     Obesity, smoking, and sedentary lifestyle greatly increases your risk for illness    A healthy diet, regular physical exercise & weight monitoring are important for maintaining a healthy lifestyle    You may be retaining fluid if you have a history of heart failure or if you experience any of the following Treatment time: 3 hours   Net UF: 1700    Pre weight: 97.8kg (bed wt)    Post weight: 96.1kg (bed wt)  EDW: 94 (92.1 last post wt) TBD    Access used: Right chest CVC  Access function: tolerated well,     Medications or blood products given: NA    Regular outpatient schedule: MWF     Summary of response to treatment: tolerated well, no s/s of hypotension noted. Copy of dialysis treatment record placed in chart, to be scanned into EMR. symptoms:  Weight gain of 3 pounds or more overnight or 5 pounds in a week, increased swelling in our hands or feet or shortness of breath while lying flat in bed. Please call your doctor as soon as you notice any of these symptoms; do not wait until your next office visit. Recognize signs and symptoms of STROKE:    F-face looks uneven    A-arms unable to move or move unevenly    S-speech slurred or non-existent    T-time-call 911 as soon as signs and symptoms begin-DO NOT go       Back to bed or wait to see if you get better-TIME IS BRAIN. Warning Signs of HEART ATTACK     Call 911 if you have these symptoms:   Chest discomfort. Most heart attacks involve discomfort in the center of the chest that lasts more than a few minutes, or that goes away and comes back. It can feel like uncomfortable pressure, squeezing, fullness, or pain.  Discomfort in other areas of the upper body. Symptoms can include pain or discomfort in one or both arms, the back, neck, jaw, or stomach.  Shortness of breath with or without chest discomfort.  Other signs may include breaking out in a cold sweat, nausea, or lightheadedness. Don't wait more than five minutes to call 911 - MINUTES MATTER! Fast action can save your life. Calling 911 is almost always the fastest way to get lifesaving treatment. Emergency Medical Services staff can begin treatment when they arrive -- up to an hour sooner than if someone gets to the hospital by car. The discharge information has been reviewed with the patient. The patient verbalized understanding. Discharge medications reviewed with the patient and appropriate educational materials and side effects teaching were provided.'           Seizure: Care Instructions  Your Care Instructions    Seizures are caused by abnormal patterns of electrical signals in the brain. They are different for each person. Seizures can affect movement, speech, vision, or awareness.  Some people have only slight shaking of a hand and do not pass out. Other people may pass out and have violent shaking of the whole body. Some people appear to stare into space. They are awake, but they can't respond normally. Later, they may not remember what happened. You may need tests to identify the type and cause of the seizures. A seizure may occur only once, or you may have them more than one time. Taking medicines as directed and following up with your doctor may help keep you from having more seizures. The doctor has checked you carefully, but problems can develop later. If you notice any problems or new symptoms, get medical treatment right away. Follow-up care is a key part of your treatment and safety. Be sure to make and go to all appointments, and call your doctor if you are having problems. It's also a good idea to know your test results and keep a list of the medicines you take. How can you care for yourself at home? · Be safe with medicines. Take your medicines exactly as prescribed. Call your doctor if you think you are having a problem with your medicine. · Do not do any activity that could be dangerous to you or others until your doctor says it is safe to do so. For example, do not drive a car, operate machinery, swim, or climb ladders. · Be sure that anyone treating you for any health problem knows that you have had a seizure and what medicines you are taking for it. · Identify and avoid things that may make you more likely to have a seizure. These may include lack of sleep, alcohol or drug use, stress, or not eating. · Make sure you go to your follow-up appointment. When should you call for help? Call 911 anytime you think you may need emergency care.  For example, call if:    · You have another seizure.     · You have more than one seizure in 24 hours.     · You have new symptoms, such as trouble walking, speaking, or thinking clearly.    Call your doctor now or seek immediate medical care if:    · You are not acting normally.    Watch closely for changes in your health, and be sure to contact your doctor if you have any problems. Where can you learn more? Go to http://lyla-shirlene.info/. Enter K226 in the search box to learn more about \"Seizure: Care Instructions. \"  Current as of: October 9, 2017  Content Version: 11.7  © 2158-3996 [employer]. Care instructions adapted under license by [employer] (which disclaims liability or warranty for this information). If you have questions about a medical condition or this instruction, always ask your healthcare professional. [address] any warranty or liability for your use of this information.     ___________________________________________________________________________________________________________________________________

## 2022-11-18 ENCOUNTER — HOSPITAL ENCOUNTER (OUTPATIENT)
Dept: WOMENS IMAGING | Age: 62
Discharge: HOME | End: 2022-11-18
Payer: COMMERCIAL

## 2022-11-18 DIAGNOSIS — R92.8 ABNORMAL MAMMOGRAM: ICD-10-CM

## 2022-11-18 PROCEDURE — 77063 BREAST TOMOSYNTHESIS BI: CPT

## 2023-07-28 NOTE — PROGRESS NOTES
0700: Assumed care of patient from [facility], RN 
 
0830: Paged Neurology to speak about MRI results and if she wants blood pressure parameters. Orders were received. Asked to receive blood pressure medication PRN orders-I was told to defer to ICU doctor. 0845: Paged Dr. Jose E Smith in regards to BP PRN orders. 2416: Orders were received from Dr. Jose E Smith. Told him family was in room and would like an update on patient's condition. He states \"He will gladly do that. \" 
 
1000: Participated in morning IDR. Family was present and apart of rounds-Dr. Jose E Smith asked if the family had any concerns. Questions were answered to the best of Dr. David salas. He tells the family that more clarification would come from neurology when they make their rounds today. The daughter seemed pleased with the answer. 1035: -paging Becca Fernandez NP for additional PRN orders. 1350: Dr. Carine Allan at the bedside discussing care with the two daughters and sister. 1400: Per dr Pastor Smith, pt needs to be hydrated after CTA with contrast d/t increased Cr.  
 
1405: Spoke with Dr. Jose E Smith about hydration status-he states the 100ml D5NS would suffice to prevent further kidney decline. 1430: Left floor to CT. 
 
1515: Arrived back on floor-no issues. VSS. Family back in the room with patient. Quinolones Counseling:  I discussed with the patient the risks of fluoroquinolones including but not limited to GI upset, allergic reaction, drug rash, diarrhea, dizziness, photosensitivity, yeast infections, liver function test abnormalities, tendonitis/tendon rupture.

## 2023-11-20 ENCOUNTER — HOSPITAL ENCOUNTER (OUTPATIENT)
Dept: WOMENS IMAGING | Facility: HOSPITAL | Age: 63
Discharge: HOME | End: 2023-11-23
Payer: COMMERCIAL

## 2023-11-20 DIAGNOSIS — Z12.31 OTHER SCREENING MAMMOGRAM: ICD-10-CM

## 2023-11-20 PROCEDURE — 77063 BREAST TOMOSYNTHESIS BI: CPT

## 2024-01-05 ENCOUNTER — HOSPITAL ENCOUNTER (OUTPATIENT)
Facility: HOSPITAL | Age: 64
Discharge: HOME | End: 2024-01-05
Attending: INTERNAL MEDICINE
Payer: COMMERCIAL

## 2024-01-05 DIAGNOSIS — Z78.0 MENOPAUSE: ICD-10-CM

## 2024-01-05 PROCEDURE — 77080 DXA BONE DENSITY AXIAL: CPT

## 2024-10-02 ENCOUNTER — HOSPITAL ENCOUNTER (OUTPATIENT)
Facility: HOSPITAL | Age: 64
Setting detail: RECURRING SERIES
Discharge: HOME | End: 2024-10-05
Payer: MEDICARE

## 2024-10-02 PROCEDURE — 97110 THERAPEUTIC EXERCISES: CPT

## 2024-10-02 PROCEDURE — 97163 PT EVAL HIGH COMPLEX 45 MIN: CPT

## 2024-10-02 NOTE — PROGRESS NOTES
[facility]  [address] [address] Phone: [phone] Fax   Plan of Care / Statement of Necessity for Physical Therapy Services     Patient Name: Amie Griffin : 1960   Medical   Diagnosis: Other low back pain [M54.59] Treatment Diagnosis: M54.59  OTHER LOWER BACK PAIN     Onset Date: chronic Payor:  Payor: AETNA MEDICARE / Plan: AETNA MEDICARE-ADVANTAGE PPO / Product Type: Medicare /    Referral Source: Chance Bright MD Start of Care (SOC): 10/2/2024   Prior Hospitalization: See medical history Provider #: [id number]   Prior Level of Function: independent ambulation, living alone, exercising daily   Comorbidities: arthritis, high cholesterol, HTN, h/o intracranial hemorrhage     Assessment / key information:  Patient is a 64 year old female who presents to physical therapy today with a diagnosis of lumbar DJD with sciatica. Patient states she has had low back pain for many years. States she had an intracranial hemorrhage in the past and had period of inactivity for about a year. She feels her back pain has worsened since this incident. She reports difficulty with most ADL's, sleeping, and bed mobility due to back pain. She has recently experienced episodes of shooting pain into left LE. She participates in seated exercise classes 4 days/week.     Objective findings:     Symptoms:  Aggravated by:              [] Bending      [] Sitting         [x] Standing     [x] Walking              [] Moving       [] Cough        [] Sneeze       [] Valsalva              [] AM              [] PM              Lying:  [] sup   [] pro   [] sidelying              [] Other:                Eased by:               [] Bending      [x] Sitting         [] Standing     [] Walking              [] Moving       [] AM              [] PM              Lying: [] sup  [] pro  [] sidelying              [] Other:    Active Movements: 
Deficits: Mehrdad's: [] R    [] L    [] +    [] -     Don: [] R    [] L    [] +    [] -     Hamstrings 90/90: WNL    Gastrocsoleus (to neutral): Right: Left:    Ely test: (+) B at grossly 90 deg       Global Muscular Weakness:  Abdominals:  Quadratus Lumborum:  Paraspinals:  Other:    Other tests/comments:  Patient demonstrates difficulty and pain with bed mobility - requires extra time for supine to sit transfer    Patient education/HEP: Access Code: [email or web address]  URL: [email or web address]  Date: 10/02/2024  Prepared by: Mario Chandler    Exercises  - Supine Transversus Abdominis Bracing - Hands on Stomach  - 3-4 x daily - 7 x weekly - 1-2 sets - 10 reps - 3 seconds hold  - Hooklying Gluteal Sets  - 1 x daily - 7 x weekly - 1-2 sets - 10 reps - 5 seconds hold    ASSESSMENT/Changes in Function: See POC    Patient will benefit from skilled PT services to modify and progress therapeutic interventions, analyze and address functional mobility deficits, analyze and address ROM deficits, analyze and address strength deficits, analyze and address soft tissue restrictions, analyze and cue for proper movement patterns, analyze and modify for postural abnormalities, and instruct in home and community integration to address functional deficits and attain remaining goals.    [x]  See Plan of Care - for goals and assessment     PLAN  [x]  Upgrade activities as tolerated     []  Continue plan of care  []  Update interventions per flow sheet       []  Other:_      Maroi Chandler, PT 10/2/2024  12:47 PM    Justification for Eval Code Complexity:  Patient History : high  Examination see exam high  Clinical Presentation: high   Clinical Decision Making: Oswestry Disability Index (ZIGGY) for Low Back Pain = 60 % ; (> 41% Severe Disability) = HIGH Complexity   
15-Miguel-2019 22:23

## 2024-10-07 ENCOUNTER — HOSPITAL ENCOUNTER (OUTPATIENT)
Facility: HOSPITAL | Age: 64
Setting detail: RECURRING SERIES
Discharge: HOME | End: 2024-10-10
Payer: MEDICARE

## 2024-10-07 PROCEDURE — 97112 NEUROMUSCULAR REEDUCATION: CPT

## 2024-10-07 PROCEDURE — 97110 THERAPEUTIC EXERCISES: CPT

## 2024-10-07 PROCEDURE — 97140 MANUAL THERAPY 1/> REGIONS: CPT

## 2024-10-07 NOTE — PROGRESS NOTES
PHYSICAL / OCCUPATIONAL THERAPY - DAILY TREATMENT NOTE (updated )    Patient Name: Amie Griffin    Date: 10/7/2024    : 1960  Insurance: Payor: [employer] MEDICARE / Plan: AETNA MEDICARE-ADVANTAGE PPO / Product Type: Medicare /      Patient  verified Yes     Visit #   Current / Total 2 8-24   Time   In / Out 12:40 1:20   Pain   In / Out 5 3-4   Subjective Functional Status/Changes: Pt reports that she tried the bridge this morning and was in pain afterwards.      TREATMENT AREA =  Other low back pain [M54.59]    OBJECTIVE         Therapeutic Procedures:    Tx Min Billable or 1:1 Min (if diff from Tx Min) Procedure, Rationale, Specifics   15  47364 Manual Therapy (timed):  decrease pain, increase ROM, increase tissue extensibility, and decrease trigger points to improve patient's ability to progress to PLOF and address remaining functional goals.  The manual therapy interventions were performed at a separate and distinct time from the therapeutic activities interventions . (see flow sheet as applicable)     Details if applicable:  MFR B TFL at greater trochanter insertion, B pir MFR     15  21427 Neuromuscular Re-Education (timed):  improve balance, coordination, kinesthetic sense, posture, core stability and proprioception to improve patient's ability to develop conscious control of individual muscles and awareness of position of extremities in order to progress to PLOF and address remaining functional goals. (see flow sheet as applicable)     Details if applicable:     10  19077 Therapeutic Exercise (timed):  increase ROM, strength, coordination, balance, and proprioception to improve patient's ability to progress to PLOF and address remaining functional goals. (see flow sheet as applicable)     Details if applicable:            Details if applicable:            Details if applicable:     40  [facility] Totals Reminder: bill using total billable min of TIMED therapeutic procedures (example: do not

## 2024-10-14 ENCOUNTER — HOSPITAL ENCOUNTER (OUTPATIENT)
Facility: HOSPITAL | Age: 64
Setting detail: RECURRING SERIES
Discharge: HOME | End: 2024-10-17
Payer: MEDICARE

## 2024-10-14 PROCEDURE — 97110 THERAPEUTIC EXERCISES: CPT

## 2024-10-14 PROCEDURE — 97112 NEUROMUSCULAR REEDUCATION: CPT

## 2024-10-14 PROCEDURE — 97140 MANUAL THERAPY 1/> REGIONS: CPT

## 2024-10-14 NOTE — PROGRESS NOTES
PHYSICAL / OCCUPATIONAL THERAPY - DAILY TREATMENT NOTE (updated )    Patient Name: Amie Griffin    Date: 10/14/2024    : 1960  Insurance: Payor: AMANDATIN MEDICARE / Plan: AETNA MEDICARE-ADVANTAGE PPO / Product Type: Medicare /      Patient  verified Yes     Visit #   Current / Total 3 8-24   Time   In / Out 12:40 1:20   Pain   In / Out 4 0   Subjective Functional Status/Changes: Pt reports that she just came from 3 chair exercise classes.      TREATMENT AREA =  Other low back pain [M54.59]    OBJECTIVE         Therapeutic Procedures:    Tx Min Billable or 1:1 Min (if diff from Tx Min) Procedure, Rationale, Specifics   10  95419 Manual Therapy (timed):  decrease pain, increase ROM, increase tissue extensibility, and decrease trigger points to improve patient's ability to progress to PLOF and address remaining functional goals.  The manual therapy interventions were performed at a separate and distinct time from the therapeutic activities interventions . (see flow sheet as applicable)     Details if applicable:  MFR low back - in direction of ease, T spine PA grade 2, ART L pir     15  53623 Neuromuscular Re-Education (timed):  improve balance, coordination, kinesthetic sense, posture, core stability and proprioception to improve patient's ability to develop conscious control of individual muscles and awareness of position of extremities in order to progress to PLOF and address remaining functional goals. (see flow sheet as applicable)     Details if applicable:     15  49281 Therapeutic Exercise (timed):  increase ROM, strength, coordination, balance, and proprioception to improve patient's ability to progress to PLOF and address remaining functional goals. (see flow sheet as applicable)     Details if applicable:            Details if applicable:            Details if applicable:     40  [facility] Totals Reminder: bill using total billable min of TIMED therapeutic procedures (example: do not

## 2024-10-22 ENCOUNTER — APPOINTMENT (OUTPATIENT)
Facility: HOSPITAL | Age: 64
End: 2024-10-22
Payer: MEDICARE

## 2024-10-24 ENCOUNTER — APPOINTMENT (OUTPATIENT)
Facility: HOSPITAL | Age: 64
End: 2024-10-24
Payer: MEDICARE

## 2024-10-29 ENCOUNTER — HOSPITAL ENCOUNTER (OUTPATIENT)
Facility: HOSPITAL | Age: 64
Setting detail: RECURRING SERIES
Discharge: HOME | End: 2024-11-01
Payer: MEDICARE

## 2024-10-29 PROCEDURE — 97530 THERAPEUTIC ACTIVITIES: CPT

## 2024-10-29 PROCEDURE — 97110 THERAPEUTIC EXERCISES: CPT

## 2024-10-29 PROCEDURE — G0283 ELEC STIM OTHER THAN WOUND: HCPCS

## 2024-10-29 PROCEDURE — 97112 NEUROMUSCULAR REEDUCATION: CPT

## 2024-10-29 NOTE — PROGRESS NOTES
PHYSICAL / OCCUPATIONAL THERAPY - DAILY TREATMENT NOTE (updated )    Patient Name: Amie Griffin    Date: 10/29/2024    : 1960  Insurance: Payor: [employer] MEDICARE / Plan: AETNA MEDICARE-ADVANTAGE PPO / Product Type: Medicare /      Patient  verified Yes     Visit #   Current / Total 4 8-24   Time   In / Out 12:34 1:40   Pain   In / Out 6/10 lower back 10   Subjective Functional Status/Changes: Pt reports that she continues to have lower back and hip pain.  Pt reports that her hips only bother her at night.  Pt states that she takes chair classes next door at [facility] and always has some soreness after that.  Pt reports that she has been performing HEP, reports that the exercises help temporarily but that she tightens back up again after about an hour.     Next PN/ RC due 2024  Auth due MAURICIO, med nec    TREATMENT AREA =  Other low back pain [M54.59]    OBJECTIVE    Modalities Rationale:     decrease pain to improve patient's ability to progress to PLOF and address remaining functional goals.    15 min [x] Estim Unattended, type/location:  IFC, L/S, supine with wedge under B LE                                    []  w/ice    []  w/heat    min [] Estim Attended, type/location:                                     []  w/US     []  w/ice    []  w/heat    []  TENS insruct      min []  Mechanical Traction: type/lbs                   []  pro   []  sup   []  int   []  cont    []  before manual    []  after manual    min []  Ultrasound, settings/location:      min  unbill []  Ice     []  Heat    location/position:     min []  Paraffin,  details:     min []  Vasopneumatic Device, press/temp:     min []  Whirlpool / Fluido:    If using vaso (only need to measure limb vaso being performed on)      pre-treatment girth :       post-treatment girth :       measured at (landmark location) :      min []  Other:    Skin assessment post-treatment:   Intact      Therapeutic Procedures:    Tx Min Billable or 1:1 
demonstrate strength of at least  3+/5 with B hip abduction to improve dynamic stability and improve ADL tolerance.  Status at IE: 3/5 B  Current Status: 3/5 left, 3+/5 right, progressing     Long Term Goals: To be accomplished in 6-12 weeks   Patient will be independent with his/her home exercise program (HEP) to maintain gains of therapy.  Status at IE: HEP issued  Current Status: compliant, progressing  2.  Patient will score < 47% on the Oswestry Disability Index to indicate improved tolerance with functional activities.  Status at IE: 60%  Current Status: 42%, MET  3.  Patient will report ability to walk 1/2 mile without increased pain in low back to improve tolerance with IADL's.  Status at IE: cannot walk more than 1/4 mile  Current Status: cannot walk more than 1/4 mile, NOT MET  4.  Patient will demonstrate strength of at least  4/5 with B hip abduction to improve dynamic stability and improve ADL tolerance.  Status at IE: 3/5 B  Current Status: 3/5 left, 3+/5 right, progressing     : Medicare, cannot change goals, cannot adjust frequency/duration, no signature required   Reporting Period: (date from last Prog Note/Eval to current Prog Note/Recert)  10/2/2024 - 10/29/2024    RECOMMENDATIONS  Patient would benefit from the continuation of skilled rehab interventions, per initial Plan of Care or most recent Medicare Recert, for functional progress to achieving above stated clinically significant goals.    If you have any questions/comments please contact us directly.  Thank you for allowing us to assist in the care of your patient.    Reshma Peña, PT       10/29/2024       12:47 PM

## 2024-11-06 ENCOUNTER — HOSPITAL ENCOUNTER (OUTPATIENT)
Facility: HOSPITAL | Age: 64
Setting detail: RECURRING SERIES
Discharge: HOME | End: 2024-11-09
Payer: MEDICARE

## 2024-11-06 PROCEDURE — 97112 NEUROMUSCULAR REEDUCATION: CPT

## 2024-11-06 PROCEDURE — 97530 THERAPEUTIC ACTIVITIES: CPT

## 2024-11-06 PROCEDURE — G0283 ELEC STIM OTHER THAN WOUND: HCPCS

## 2024-11-06 PROCEDURE — 97110 THERAPEUTIC EXERCISES: CPT

## 2024-11-06 NOTE — PROGRESS NOTES
PHYSICAL / OCCUPATIONAL THERAPY - DAILY TREATMENT NOTE (updated )    Patient Name: Amie Griffin    Date: 2024    : 1960  Insurance: Payor: AMANDATTIN MEDICARE / Plan: AETNA MEDICARE-ADVANTAGE PPO / Product Type: Medicare /      Patient  verified Yes     Visit #   Current / Total 5 8-24   Time   In / Out 12:40 1:35   Pain   In / Out 2-3/10 lower back 2   Subjective Functional Status/Changes: Pt reports she feels great when she leaves and then she wakes up with the pain when she starts moving, especially walking.      Next PN/ RC due 2024  Auth due MAURICIO, med nec    TREATMENT AREA =  Other low back pain [M54.59]    OBJECTIVE    Modalities Rationale:     decrease pain to improve patient's ability to progress to PLOF and address remaining functional goals.    15 min [x] Estim Unattended, type/location:  IFC, L/S, supine with wedge under B LE                                    []  w/ice    []  w/heat    min [] Estim Attended, type/location:                                     []  w/US     []  w/ice    []  w/heat    []  TENS insruct      min []  Mechanical Traction: type/lbs                   []  pro   []  sup   []  int   []  cont    []  before manual    []  after manual    min []  Ultrasound, settings/location:      min  unbill []  Ice     []  Heat    location/position:     min []  Paraffin,  details:     min []  Vasopneumatic Device, press/temp:     min []  Whirlpool / Fluido:    If using vaso (only need to measure limb vaso being performed on)      pre-treatment girth :       post-treatment girth :       measured at (landmark location) :      min []  Other:    Skin assessment post-treatment:   Intact      Therapeutic Procedures:    Tx Min Billable or 1:1 Min (if diff from Tx Min) Procedure, Rationale, Specifics   12  45582 Therapeutic Exercise (timed):  increase ROM, strength, coordination, balance, and proprioception to improve patient's ability to progress to PLOF and address remaining

## 2024-11-13 ENCOUNTER — APPOINTMENT (OUTPATIENT)
Facility: HOSPITAL | Age: 64
End: 2024-11-13
Payer: MEDICARE

## 2024-11-18 ENCOUNTER — HOSPITAL ENCOUNTER (OUTPATIENT)
Facility: HOSPITAL | Age: 64
Setting detail: RECURRING SERIES
Discharge: HOME | End: 2024-11-21
Payer: MEDICARE

## 2024-11-18 PROCEDURE — 97530 THERAPEUTIC ACTIVITIES: CPT

## 2024-11-18 PROCEDURE — 97110 THERAPEUTIC EXERCISES: CPT

## 2024-11-18 PROCEDURE — 97112 NEUROMUSCULAR REEDUCATION: CPT

## 2024-11-18 NOTE — PROGRESS NOTES
PHYSICAL / OCCUPATIONAL THERAPY - DAILY TREATMENT NOTE (updated )    Patient Name: Amie Griffin    Date: 2024    : 1960  Insurance: Payor: [employer]ICARE / Plan: AETNA MEDICARE-ADVANTAGE PPO / Product Type: Medicare /      Patient  verified Yes     Visit #   Current / Total 6 8-24   Time   In / Out 12:40 1:35   Pain   In / Out 4 3   Subjective Functional Status/Changes: Pt reports she wrenched her back doing lunges in a workout class.      Next PN/ RC due 2024  Auth due MAURICIO, med nec    TREATMENT AREA =  Other low back pain [M54.59]    OBJECTIVE    Modalities Rationale:     decrease pain to improve patient's ability to progress to PLOF and address remaining functional goals.    15 min [x] Estim Unattended, type/location:  IFC, L/S, supine with wedge under B LE                                    []  w/ice    []  w/heat    min [] Estim Attended, type/location:                                     []  w/US     []  w/ice    []  w/heat    []  TENS insruct      min []  Mechanical Traction: type/lbs                   []  pro   []  sup   []  int   []  cont    []  before manual    []  after manual    min []  Ultrasound, settings/location:      min  unbill []  Ice     []  Heat    location/position:     min []  Paraffin,  details:     min []  Vasopneumatic Device, press/temp:     min []  Whirlpool / Fluido:    If using vaso (only need to measure limb vaso being performed on)      pre-treatment girth :       post-treatment girth :       measured at (landmark location) :      min []  Other:    Skin assessment post-treatment:   Intact      Therapeutic Procedures:    Tx Min Billable or 1:1 Min (if diff from Tx Min) Procedure, Rationale, Specifics   12  00296 Therapeutic Exercise (timed):  increase ROM, strength, coordination, balance, and proprioception to improve patient's ability to progress to PLOF and address remaining functional goals. (see flow sheet as applicable)     Details if

## 2024-11-25 ENCOUNTER — HOSPITAL ENCOUNTER (OUTPATIENT)
Dept: WOMENS IMAGING | Facility: HOSPITAL | Age: 64
Discharge: HOME | End: 2024-11-28
Payer: MEDICARE

## 2024-11-25 DIAGNOSIS — Z12.31 ENCOUNTER FOR SCREENING MAMMOGRAM FOR MALIGNANT NEOPLASM OF BREAST: ICD-10-CM

## 2024-11-25 PROCEDURE — 77063 BREAST TOMOSYNTHESIS BI: CPT

## 2024-11-26 ENCOUNTER — HOSPITAL ENCOUNTER (OUTPATIENT)
Facility: HOSPITAL | Age: 64
Setting detail: RECURRING SERIES
Discharge: HOME | End: 2024-11-29
Payer: MEDICARE

## 2024-11-26 PROCEDURE — 97110 THERAPEUTIC EXERCISES: CPT

## 2024-11-26 PROCEDURE — G0283 ELEC STIM OTHER THAN WOUND: HCPCS

## 2024-11-26 PROCEDURE — 97530 THERAPEUTIC ACTIVITIES: CPT

## 2024-11-26 PROCEDURE — 97535 SELF CARE MNGMENT TRAINING: CPT

## 2024-11-26 PROCEDURE — 97112 NEUROMUSCULAR REEDUCATION: CPT

## 2024-11-26 NOTE — PROGRESS NOTES
Rationale, Specifics   8  56983 Therapeutic Exercise (timed):  increase ROM, strength, coordination, balance, and proprioception to improve patient's ability to progress to PLOF and address remaining functional goals. (see flow sheet as applicable)     Details if applicable:  HR, H/L hip add/abd, LTR/WW     22  57259 Neuromuscular Re-Education (timed):  improve balance, coordination, kinesthetic sense, posture, core stability and proprioception to improve patient's ability to develop conscious control of individual muscles and awareness of position of extremities in order to progress to PLOF and address remaining functional goals. (see flow sheet as applicable)     Details if applicable:  Stand hip flex/abd, RFISitting, t-band row/ext, BKFO, clam, H/L march   15  31704 Therapeutic Activity (timed):  use of dynamic activities replicating functional movements to increase ROM, strength, coordination, balance, and proprioception in order to improve patient's ability to progress to PLOF and address remaining functional goals.  (see flow sheet as applicable)     Details if applicable:  Purvi Ruiz, bridge with ball   8  33205 Self Care/Home Management (timed):  improve patient knowledge and understanding of home injury/symptom/pain management, positioning, posture/ergonomics, and activity modification  to improve patient's ability to progress to PLOF and address remaining functional goals.  (see flow sheet as applicable)     Details if applicable:  Educated on L/S mechanics and use of RFISitting to relieve exacerbation of pain due to standing/walking        53  [facility] Totals Reminder: bill using total billable min of TIMED therapeutic procedures (example: do not include dry needle or estim unattended, both untimed codes, in totals to left)  8-22 min = 1 unit; 23-37 min = 2 units; 38-52 min = 3 units; 53-67 min = 4 units; 68-82 min = 5 units   Total Total     [x]  Patient Education billed concurrently with other

## 2024-12-06 ENCOUNTER — HOSPITAL ENCOUNTER (OUTPATIENT)
Facility: HOSPITAL | Age: 64
Setting detail: RECURRING SERIES
Discharge: HOME | End: 2024-12-09
Payer: MEDICARE

## 2024-12-06 PROCEDURE — G0283 ELEC STIM OTHER THAN WOUND: HCPCS

## 2024-12-06 PROCEDURE — 97112 NEUROMUSCULAR REEDUCATION: CPT

## 2024-12-06 PROCEDURE — 97530 THERAPEUTIC ACTIVITIES: CPT

## 2024-12-06 PROCEDURE — 97110 THERAPEUTIC EXERCISES: CPT

## 2024-12-06 NOTE — PROGRESS NOTES
PHYSICAL THERAPY - DAILY TREATMENT NOTE (updated )    Patient Name: Amie Griffin    Date: 2024    : 1960  Insurance: Payor: [employer]IC[employer] / Plan: AETNA MEDICARE-ADVANTAGE PPO / Product Type: Medicare /      Patient  verified yes     Visit #   Current / Total 8 8-24   Time   In / Out 1:20 2:14   Pain   In / Out 0/10 0/10   Subjective Functional Status/Changes: \"I have just been running errands today. I don't have any back pain right now. I feel like this has been helping. I feel like my core is stronger. I am trying to make myself stand straighter.\"  Patient states standing and walking tolerance is a little better but still limited. She reports a 25-30% improvement since beginning therapy. States she can stand 10-15 minutes now; she is still unable to walk 2 blocks (can walk 1 block).      TREATMENT AREA =  Other low back pain [M54.59]    Next PN/ RC due 2024  Auth due MAURICIO, med nec    OBJECTIVE    Estim Unattended (UNTIMED), with HEAT type/location: IFC to right L/S region in supported H/L     Min Rationale   15 decrease pain and increase tissue extensibility to improve patient's ability to progress to PLOF and address remaining functional goals.     Skin assessment post-treatment:   Intact     Therapeutic Procedures:  Tx Min Billable or 1:1 Min (if diff from Tx Min) Procedure, Rationale, Specifics   10  69959 Therapeutic Exercise (timed):  increase ROM, strength, coordination, balance, and proprioception to improve patient's ability to progress to PLOF and address remaining functional goals. (see flow sheet as applicable)     Details if applicable:  H/L hip abd/add, LTR     8  42585 Therapeutic Activity (timed):  use of dynamic activities replicating functional movements to increase ROM, strength, coordination, balance, and proprioception in order to improve patient's ability to progress to PLOF and address remaining functional goals.  (see flow sheet as applicable)     Details

## 2024-12-06 NOTE — PROGRESS NOTES
[facility] at [facility]  [address], [place], [address] Phone , fax    CONTINUED PLAN OF CARE/RECERTIFICATION FOR PHYSICAL THERAPY          Patient Name: Amie Griffin : 1960   Treatment/Medical Diagnosis: Other low back pain [M54.59]   Onset Date: chronic    Referral Source: Chance Bright MD Start of Care (SOC): 10/2/24   Prior Hospitalization: See Medical History Provider #: [id number]   Prior Level of Function:   independent ambulation, living alone, exercising daily      Comorbidities: arthritis, high cholesterol, HTN, h/o intracranial hemorrhage    Visits from SOC: 8 Missed Visits: 2     Progress to Goals:    Short Term Goals: To be accomplished in 4-6 weeks   Patient will be compliant with home exercise program (HEP) to promote gains with therapy.  Status at IE: HEP issued  Current Status: compliant, MET  2.  Patient will demonstrate ability to tolerate a supine bridge with minimal pain.  Status at IE: unable to tolerate due to low back pain  Current Status: minimal clearance with pain, NOT MET  3. Patient will demonstrate strength of at least  3+/5 with B hip abduction to improve dynamic stability and improve ADL tolerance.  Status at IE: 3/5 B  Current Status: 4-/5 B, MET     Long Term Goals: To be accomplished in 6-12 weeks   Patient will be independent with his/her home exercise program (HEP) to maintain gains of therapy.  Status at IE: HEP issued  Current Status: compliant, progressing  2.  Patient will score < 47% on the Oswestry Disability Index to indicate improved tolerance with functional activities.  Status at IE: 60%  Current Status: 50%,  NOT MET  3.  Patient will report ability to walk 1/2 mile without increased pain in low back to improve tolerance with IADL's.  Status at IE: cannot walk more than 1/4 mile  Current Status: cannot walk more than 1/4 mile, NOT MET  4.  Patient will

## 2024-12-09 ENCOUNTER — APPOINTMENT (OUTPATIENT)
Facility: HOSPITAL | Age: 64
End: 2024-12-09
Payer: MEDICARE

## 2024-12-16 ENCOUNTER — HOSPITAL ENCOUNTER (OUTPATIENT)
Facility: HOSPITAL | Age: 64
Setting detail: RECURRING SERIES
Discharge: HOME | End: 2024-12-19
Payer: MEDICARE

## 2024-12-16 PROCEDURE — 97112 NEUROMUSCULAR REEDUCATION: CPT

## 2024-12-16 PROCEDURE — 97110 THERAPEUTIC EXERCISES: CPT

## 2024-12-16 PROCEDURE — 97530 THERAPEUTIC ACTIVITIES: CPT

## 2024-12-16 PROCEDURE — G0283 ELEC STIM OTHER THAN WOUND: HCPCS

## 2024-12-16 NOTE — PROGRESS NOTES
PHYSICAL THERAPY - DAILY TREATMENT NOTE (updated )    Patient Name: Amie Griffin    Date: 2024    : 1960  Insurance: Payor: [employer] MEDICARE / Plan: AETNA MEDICARE-ADVANTAGE PPO / Product Type: Medicare /      Patient  verified yes     Visit #   Current / Total 9 8-24   Time   In / Out 12:40 1:35   Pain   In / Out 0/10 0/10   Subjective Functional Status/Changes: Pt reports her grandkids stayed with her this weekend and after running around a lot she was in a lot of pain.      TREATMENT AREA =  Other low back pain [M54.59]    Next PN/ RC due 2024  Auth due MAURICIO, med nec    OBJECTIVE    Estim Unattended (UNTIMED), with HEAT type/location: IFC to right L/S region in supported H/L     Min Rationale   15 decrease pain and increase tissue extensibility to improve patient's ability to progress to PLOF and address remaining functional goals.     Skin assessment post-treatment:   Intact     Therapeutic Procedures:  Tx Min Billable or 1:1 Min (if diff from Tx Min) Procedure, Rationale, Specifics   12  04889 Therapeutic Exercise (timed):  increase ROM, strength, coordination, balance, and proprioception to improve patient's ability to progress to PLOF and address remaining functional goals. (see flow sheet as applicable)     Details if applicable:       8  59186 Therapeutic Activity (timed):  use of dynamic activities replicating functional movements to increase ROM, strength, coordination, balance, and proprioception in order to improve patient's ability to progress to PLOF and address remaining functional goals.  (see flow sheet as applicable)     Details if applicable:       75339 Neuromuscular Re-Education (timed):  improve balance, coordination, kinesthetic sense, posture, core stability and proprioception to improve patient's ability to develop conscious control of individual muscles and awareness of position of extremities in order to progress to PLOF and address remaining

## 2025-01-06 ENCOUNTER — HOSPITAL ENCOUNTER (OUTPATIENT)
Facility: HOSPITAL | Age: 65
Setting detail: RECURRING SERIES
Discharge: HOME | End: 2025-01-09
Payer: MEDICARE

## 2025-01-06 PROCEDURE — 97530 THERAPEUTIC ACTIVITIES: CPT

## 2025-01-06 PROCEDURE — G0283 ELEC STIM OTHER THAN WOUND: HCPCS

## 2025-01-06 PROCEDURE — 97112 NEUROMUSCULAR REEDUCATION: CPT

## 2025-01-06 PROCEDURE — 97110 THERAPEUTIC EXERCISES: CPT

## 2025-01-06 NOTE — PROGRESS NOTES
[facility] at [facility]  [address], [place], [address] Phone , fax    CONTINUED PLAN OF CARE/RECERTIFICATION FOR PHYSICAL THERAPY          Patient Name: Amie Griffin : 1960   Treatment/Medical Diagnosis: Other low back pain [M54.59]   Onset Date: chronic    Referral Source: Chance Bright MD Start of Care (SOC): 10/2/24   Prior Hospitalization: See Medical History Provider #: [id number]   Prior Level of Function: independent ambulation, living alone, exercising daily    Comorbidities:   arthritis, high cholesterol, HTN, h/o intracranial hemorrhage      Visits from SOC: 10 Missed Visits: 0     Progress to Goals:  Short Term Goals: To be accomplished in 4-6 weeks   Patient will be compliant with home exercise program (HEP) to promote gains with therapy.  Status at IE: HEP issued  Current Status: compliant, GOAL MET  2.  Patient will demonstrate ability to tolerate a supine bridge with minimal pain.  Status at IE: unable to tolerate due to low back pain  Current Status: GOAL MET  3. Patient will demonstrate strength of at least  3+/5 with B hip abduction to improve dynamic stability and improve ADL tolerance.  Status at IE: 3/5 B  Current Status: 4-/5 B, GOAL MET     Long Term Goals: To be accomplished in 6-12 weeks   Patient will be independent with his/her home exercise program (HEP) to maintain gains of therapy.  Status at IE: HEP issued  Current Status: compliant, PROGRESSING  2.  Patient will score < 47% on the Oswestry Disability Index to indicate improved tolerance with functional activities.  Status at IE: 60%  Last PN: 50%  Current: 26% GOAL MET  3.  Patient will report ability to walk 1/2 mile without increased pain in low back to improve tolerance with IADL's.  Status at IE: cannot walk more than 1/4 mile  Current Status: patient reports if she walks at a relaxed pace she can go for more than half 
Patient will be independent with his/her home exercise program (HEP) to maintain gains of therapy.  Status at IE: HEP issued  Current Status: compliant, PROGRESSING  2.  Patient will score < 47% on the Oswestry Disability Index to indicate improved tolerance with functional activities.  Status at IE: 60%  Last PN: 50%  Current: 26% GOAL MET  3.  Patient will report ability to walk 1/2 mile without increased pain in low back to improve tolerance with IADL's.  Status at IE: cannot walk more than 1/4 mile  Current Status: patient reports if she walks at a relaxed pace she can go for more than half an hour. At a steady, faster pace she can't go past a few blocks. She will rest 5 minutes and then is ready to go again. PROGRESSING  4.  Patient will demonstrate strength of at least  4/5 with B hip abduction to improve dynamic stability and improve ADL tolerance.  Status at IE: 3/5 B  Current Status: 4-/5, PROGRESSING    PLAN  yes Continue plan of care  [x]  Upgrade activities as tolerated  []  Discharge due to :  []  Other:    Marisol Fairbanks, PT    1/6/2025    12:45 PM    Future Appointments   Date Time Provider Department Center   1/13/2025 12:40 PM Reshma Peña PT [facility]   1/20/2025 12:40 PM Marisol Fairbanks, PT [facility]   1/27/2025 12:40 PM Marisol Fairbanks PT [facility]   12/1/2025  1:20 PM YARED VARGAS BX [facility]       If an interpreting service was utilized for treatment of this patient, the contents of this document represent the material reviewed with the patient via the .

## 2025-01-13 ENCOUNTER — HOSPITAL ENCOUNTER (OUTPATIENT)
Facility: HOSPITAL | Age: 65
Setting detail: RECURRING SERIES
Discharge: HOME | End: 2025-01-16
Payer: MEDICARE

## 2025-01-13 PROCEDURE — 97530 THERAPEUTIC ACTIVITIES: CPT

## 2025-01-13 PROCEDURE — 97110 THERAPEUTIC EXERCISES: CPT

## 2025-01-13 PROCEDURE — 97112 NEUROMUSCULAR REEDUCATION: CPT

## 2025-01-13 NOTE — PROGRESS NOTES
PHYSICAL / OCCUPATIONAL THERAPY - DAILY TREATMENT NOTE (updated )    Patient Name: Amie Griffin    Date: 2025    : 1960  Insurance: Payor: [employer]ICARE / Plan: AETNA MEDICARE-ADVANTAGE PPO / Product Type: Medicare /      Patient  verified Yes     Visit #   Current / Total 11 14-18   Time   In / Out 12:40 1:18   Pain   In / Out 2 2   Subjective Functional Status/Changes: Pt reports that she did 2 45-minute classes at [facility] prior to this appointment.  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     Next PN/ RC due PN 2025, RC 3/3/2025  Auth due MAURICIO, med nec    TREATMENT AREA =  Other low back pain [M54.59]    OBJECTIVE    Therapeutic Procedures:    Tx Min Billable or 1:1 Min (if diff from Tx Min) Procedure, Rationale, Specifics   8  81131 Therapeutic Exercise (timed):  increase ROM, strength, coordination, balance, and proprioception to improve patient's ability to progress to PLOF and address remaining functional goals. (see flow sheet as applicable)     Details if applicable:  Stand hip 3-way, LTR/WW     18  77124 Neuromuscular Re-Education (timed):  improve balance, coordination, kinesthetic sense, posture, core stability and proprioception to improve patient's ability to develop conscious control of individual muscles and awareness of position of extremities in order to progress to PLOF and address remaining functional goals. (see flow sheet as applicable)     Details if applicable:  T-band row/ext, Pallof press, PPT at wall, DD TA, DD march,  LAQ, RFISitting, clam, H/L toe taps #1   12  51361 Therapeutic Activity (timed):  use of dynamic activities replicating functional movements to increase ROM, strength, coordination, balance, and proprioception in order to improve patient's ability to progress to PLOF and address remaining functional goals.  (see flow sheet as applicable)     Details if

## 2025-01-20 ENCOUNTER — HOSPITAL ENCOUNTER (OUTPATIENT)
Facility: HOSPITAL | Age: 65
Setting detail: RECURRING SERIES
Discharge: HOME | End: 2025-01-23
Payer: MEDICARE

## 2025-01-20 PROCEDURE — 97112 NEUROMUSCULAR REEDUCATION: CPT

## 2025-01-20 PROCEDURE — 97530 THERAPEUTIC ACTIVITIES: CPT

## 2025-01-20 PROCEDURE — 97110 THERAPEUTIC EXERCISES: CPT

## 2025-01-20 NOTE — PROGRESS NOTES
PHYSICAL / OCCUPATIONAL THERAPY - DAILY TREATMENT NOTE (updated )    Patient Name: Amie Griffin    Date: 2025    : 1960  Insurance: Payor: [employer] MEDICARE / Plan: AETNA MEDICARE-ADVANTAGE PPO / Product Type: Medicare /      Patient  verified Yes     Visit #   Current / Total 12 14-18   Time   In / Out 12:40 1:20   Pain   In / Out 0 0   Subjective Functional Status/Changes: Pt reports that she has been feeling good     Next PN/ RC due PN 2025, RC 3/3/2025  Auth due MAURICIO, med nec    TREATMENT AREA =  Other low back pain [M54.59]    OBJECTIVE    Therapeutic Procedures:    Tx Min Billable or 1:1 Min (if diff from Tx Min) Procedure, Rationale, Specifics   8  93290 Therapeutic Exercise (timed):  increase ROM, strength, coordination, balance, and proprioception to improve patient's ability to progress to PLOF and address remaining functional goals. (see flow sheet as applicable)     Details if applicable:       17  33627 Neuromuscular Re-Education (timed):  improve balance, coordination, kinesthetic sense, posture, core stability and proprioception to improve patient's ability to develop conscious control of individual muscles and awareness of position of extremities in order to progress to PLOF and address remaining functional goals. (see flow sheet as applicable)     Details if applicable:     15  44999 Therapeutic Activity (timed):  use of dynamic activities replicating functional movements to increase ROM, strength, coordination, balance, and proprioception in order to improve patient's ability to progress to PLOF and address remaining functional goals.  (see flow sheet as applicable)     Details if applicable:               40  [facility] Totals Reminder: bill using total billable min of TIMED therapeutic procedures (example: do not include dry needle or estim unattended, both untimed codes, in totals to left)  8-22 min = 1 unit; 23-37 min = 2 units; 38-52 min = 3 units; 53-67 min = 4

## 2025-01-27 ENCOUNTER — APPOINTMENT (OUTPATIENT)
Facility: HOSPITAL | Age: 65
End: 2025-01-27
Payer: MEDICARE

## 2025-01-27 ENCOUNTER — HOSPITAL ENCOUNTER (OUTPATIENT)
Facility: HOSPITAL | Age: 65
Setting detail: RECURRING SERIES
Discharge: HOME | End: 2025-01-30
Payer: MEDICARE

## 2025-01-27 PROCEDURE — 97530 THERAPEUTIC ACTIVITIES: CPT

## 2025-01-27 PROCEDURE — 97112 NEUROMUSCULAR REEDUCATION: CPT

## 2025-01-27 PROCEDURE — 97116 GAIT TRAINING THERAPY: CPT

## 2025-01-27 PROCEDURE — 97110 THERAPEUTIC EXERCISES: CPT

## 2025-01-27 NOTE — PROGRESS NOTES
PHYSICAL / OCCUPATIONAL THERAPY - DAILY TREATMENT NOTE (updated )    Patient Name: Amie Griffin    Date: 2025    : 1960  Insurance: Payor: [employer] MEDICARE / Plan: AETNA MEDICARE-ADVANTAGE PPO / Product Type: Medicare /      Patient  verified Yes     Visit #   Current / Total 13 14-18   Time   In / Out 12:40 1:33   Pain   In / Out 0 0   Subjective Functional Status/Changes: Pt reports she can stand 30-45 minutes before the pain starts. She had R calf muscle soreness for 3 days following last session.      Next PN/ RC due PN 2025, RC 3/3/2025  Auth due MAURICIO, med nec    TREATMENT AREA =  Other low back pain [M54.59]    OBJECTIVE    Therapeutic Procedures:    Tx Min Billable or 1:1 Min (if diff from Tx Min) Procedure, Rationale, Specifics   8  33895 Therapeutic Exercise (timed):  increase ROM, strength, coordination, balance, and proprioception to improve patient's ability to progress to PLOF and address remaining functional goals. (see flow sheet as applicable)     Details if applicable:       22  12156 Neuromuscular Re-Education (timed):  improve balance, coordination, kinesthetic sense, posture, core stability and proprioception to improve patient's ability to develop conscious control of individual muscles and awareness of position of extremities in order to progress to PLOF and address remaining functional goals. (see flow sheet as applicable)     Details if applicable:     15  79795 Therapeutic Activity (timed):  use of dynamic activities replicating functional movements to increase ROM, strength, coordination, balance, and proprioception in order to improve patient's ability to progress to PLOF and address remaining functional goals.  (see flow sheet as applicable)     Details if applicable:     8  25573 Gait Training (timed): To improve safety and dynamic movement with household/community ambulation.  (see flow sheet as applicable)          53  [facility] Totals Reminder: bill using

## 2025-01-29 ENCOUNTER — APPOINTMENT (OUTPATIENT)
Facility: HOSPITAL | Age: 65
End: 2025-01-29
Payer: MEDICARE

## 2025-02-03 ENCOUNTER — HOSPITAL ENCOUNTER (OUTPATIENT)
Facility: HOSPITAL | Age: 65
Setting detail: RECURRING SERIES
Discharge: HOME | End: 2025-02-06
Payer: MEDICARE

## 2025-02-03 PROCEDURE — 97112 NEUROMUSCULAR REEDUCATION: CPT

## 2025-02-03 PROCEDURE — 97110 THERAPEUTIC EXERCISES: CPT

## 2025-02-03 PROCEDURE — 97140 MANUAL THERAPY 1/> REGIONS: CPT

## 2025-02-03 NOTE — PROGRESS NOTES
PHYSICAL / OCCUPATIONAL THERAPY - DAILY TREATMENT NOTE (updated )    Patient Name: Amie Griffin    Date: 2/3/2025    : 1960  Insurance: Payor: [employer] MEDICARE / Plan: AETNA MEDICARE-ADVANTAGE PPO / Product Type: Medicare /      Patient  verified Yes     Visit #   Current / Total 14 14-18   Time   In / Out 11:20 12:15   Pain   In / Out 5 0   Subjective Functional Status/Changes: Pt reports she woke up the day after last session and was in more pain. It is slowly improving. She hadn't been going to as many exercises or moving as much with the snow storm and holiday.      Next PN/ RC due PN 2025, RC 3/3/2025  Auth due MAURICIO, med nec    TREATMENT AREA =  Other low back pain [M54.59]    OBJECTIVE    Therapeutic Procedures:    Tx Min Billable or 1:1 Min (if diff from Tx Min) Procedure, Rationale, Specifics   10  78476 Therapeutic Exercise (timed):  increase ROM, strength, coordination, balance, and proprioception to improve patient's ability to progress to PLOF and address remaining functional goals. (see flow sheet as applicable)     Details if applicable:       112 Neuromuscular Re-Education (timed):  improve balance, coordination, kinesthetic sense, posture, core stability and proprioception to improve patient's ability to develop conscious control of individual muscles and awareness of position of extremities in order to progress to PLOF and address remaining functional goals. (see flow sheet as applicable)     Details if applicable:     10  21830 Manual Therapy (timed):  decrease pain and increase tissue extensibility to improve patient's ability to progress to PLOF and address remaining functional goals.  The manual therapy interventions were performed at a separate and distinct time from the therapeutic activities interventions . (see flow sheet as applicable)      Cupping low back    53  [facility] Totals Reminder: bill using total billable min of TIMED therapeutic procedures (example:

## 2025-02-03 NOTE — PROGRESS NOTES
[facility] - [facility] AT [facility]  [address] [place] [address]   Phone: [phone] Fax: [phone]  PROGRESS NOTE  Patient Name: Amie Griffin : 1960   Treatment/Medical Diagnosis: Other low back pain [M54.59] Other low back pain [M54.59]   Referral Source: Chance Bright MD     Date of Initial Visit: 10/2/24 Attended Visits: 14 Missed Visits: 0     SUMMARY OF TREATMENT  Patient's POC has consisted of therex, therapeutic activities, manual therapy prn, modalities prn, pt. education, and a comprehensive HEP. Treatment strategies used to address functional mobility deficits, ROM deficits, strength deficits, analyze and address soft tissue restrictions, analyze and cue movement patterns, analyze and modify body mechanics/ergonomics, assess and modify postural abnormalities and instruct in home and community integration.      CURRENT STATUS  Patient will be able to walk 1/2 mile at a faster, self selected pace without little to no low back pain.         Last PN: able to walk 1/2 mile at a slow pace, only 1/4 mile at faster, self selected pace.   Current: able to walk 30 minutes at slow pace, but on a block or so at faster pace PROGRESSING  2.   Patient will improve bilateral hip abduction to at least 4/5 in order to walk with less difficulty        Last PN: 4-/5 Suman  Current: 4/5 GOAL MET  3.   Patient will be independent with his/her home exercise program (HEP) to maintain gains of therapy.        Current: pt not yet independent with HEP PROGRESSING    RECOMMENDATIONS  Pt has met 1 goal, but is still not independent with HEP and has pain with walking. She continues to need skilled PT to help meet this goal.     If you have any questions/comments please contact us directly at [phone]   Thank you for allowing us to assist in the care of your patient.  PTA Signature Marisol Fairbanks, PT     Therapist Signature: Marisol Fairbanks, PT

## 2025-02-11 ENCOUNTER — HOSPITAL ENCOUNTER (OUTPATIENT)
Facility: HOSPITAL | Age: 65
Setting detail: RECURRING SERIES
Discharge: HOME | End: 2025-02-14
Payer: MEDICARE

## 2025-02-11 PROCEDURE — 97530 THERAPEUTIC ACTIVITIES: CPT

## 2025-02-11 PROCEDURE — 97112 NEUROMUSCULAR REEDUCATION: CPT

## 2025-02-11 PROCEDURE — 97110 THERAPEUTIC EXERCISES: CPT

## 2025-02-11 NOTE — PROGRESS NOTES
PHYSICAL / OCCUPATIONAL THERAPY - DAILY TREATMENT NOTE (updated )    Patient Name: Amie Griffin    Date: 2025    : 1960  Insurance: Payor: [employer] / Plan: [employer] MEDICARE-ADVANTAGE PPO / Product Type: Medicare /      Patient  verified Yes     Visit #   Current / Total 15 22   Time   In / Out 11:20 12:00   Pain   In / Out 0 0   Subjective Functional Status/Changes: Pt reports feeling better today. She will notice she will start to feel it if she is just standing and cooking. If she does her pelvic tilts it helps     Next PN/ RC due 3/3/25, RC 3/3/2025  Auth due MAURICIO, med nec    TREATMENT AREA =  Other low back pain [M54.59]    OBJECTIVE    Therapeutic Procedures:    Tx Min Billable or 1:1 Min (if diff from Tx Min) Procedure, Rationale, Specifics   10  91164 Therapeutic Exercise (timed):  increase ROM, strength, coordination, balance, and proprioception to improve patient's ability to progress to PLOF and address remaining functional goals. (see flow sheet as applicable)     Details if applicable:       112 Neuromuscular Re-Education (timed):  improve balance, coordination, kinesthetic sense, posture, core stability and proprioception to improve patient's ability to develop conscious control of individual muscles and awareness of position of extremities in order to progress to PLOF and address remaining functional goals. (see flow sheet as applicable)     Details if applicable:     10  58736 Therapeutic Activity (timed):  use of dynamic activities replicating functional movements to increase ROM, strength, coordination, balance, and proprioception in order to improve patient's ability to progress to PLOF and address remaining functional goals.  (see flow sheet as applicable)      Cupping low back    40  [facility] Totals Reminder: bill using total billable min of TIMED therapeutic procedures (example: do not include dry needle or estim unattended, both untimed codes, in totals to

## 2025-02-17 ENCOUNTER — APPOINTMENT (OUTPATIENT)
Facility: HOSPITAL | Age: 65
End: 2025-02-17
Payer: MEDICARE

## 2025-02-18 ENCOUNTER — HOSPITAL ENCOUNTER (OUTPATIENT)
Facility: HOSPITAL | Age: 65
Setting detail: RECURRING SERIES
Discharge: HOME | End: 2025-02-21
Payer: MEDICARE

## 2025-02-18 PROCEDURE — 97112 NEUROMUSCULAR REEDUCATION: CPT

## 2025-02-18 PROCEDURE — 97530 THERAPEUTIC ACTIVITIES: CPT

## 2025-02-18 PROCEDURE — 97110 THERAPEUTIC EXERCISES: CPT

## 2025-02-18 NOTE — PROGRESS NOTES
PHYSICAL / OCCUPATIONAL THERAPY - DAILY TREATMENT NOTE (updated )    Patient Name: Amie Griffin    Date: 2025    : 1960  Insurance: Payor: [employer] / Plan: AET[employer] MEDICARE-ADVANTAGE PPO / Product Type: Medicare /      Patient  verified Yes     Visit #   Current / Total 16 22   Time   In / Out 11:25 12:05   Pain   In / Out 0 0   Subjective Functional Status/Changes: Pt  reports doing well, just feeling some soreness (points to R side glut) when she lies on her side.      Next PN/ RC due 3/3/25, RC 3/3/2025  Auth due MAURICIO, med nec    TREATMENT AREA =  Other low back pain [M54.59]    OBJECTIVE    Therapeutic Procedures:    Tx Min Billable or 1:1 Min (if diff from Tx Min) Procedure, Rationale, Specifics   10  00068 Therapeutic Exercise (timed):  increase ROM, strength, coordination, balance, and proprioception to improve patient's ability to progress to PLOF and address remaining functional goals. (see flow sheet as applicable)     Details if applicable:         13628 Neuromuscular Re-Education (timed):  improve balance, coordination, kinesthetic sense, posture, core stability and proprioception to improve patient's ability to develop conscious control of individual muscles and awareness of position of extremities in order to progress to PLOF and address remaining functional goals. (see flow sheet as applicable)     Details if applicable:     10  07985 Therapeutic Activity (timed):  use of dynamic activities replicating functional movements to increase ROM, strength, coordination, balance, and proprioception in order to improve patient's ability to progress to PLOF and address remaining functional goals.  (see flow sheet as applicable)      Cupping low back    40  [facility] Totals Reminder: bill using total billable min of TIMED therapeutic procedures (example: do not include dry needle or estim unattended, both untimed codes, in totals to left)  8-22 min = 1 unit; 23-37 min = 2 units;

## 2025-02-24 ENCOUNTER — HOSPITAL ENCOUNTER (OUTPATIENT)
Facility: HOSPITAL | Age: 65
Setting detail: RECURRING SERIES
Discharge: HOME | End: 2025-02-27
Payer: MEDICARE

## 2025-02-24 PROCEDURE — 97530 THERAPEUTIC ACTIVITIES: CPT

## 2025-02-24 PROCEDURE — 97110 THERAPEUTIC EXERCISES: CPT

## 2025-02-24 PROCEDURE — 97112 NEUROMUSCULAR REEDUCATION: CPT

## 2025-02-24 NOTE — PROGRESS NOTES
PHYSICAL / OCCUPATIONAL THERAPY - DAILY TREATMENT NOTE (updated )    Patient Name: Amie Griffin    Date: 2025    : 1960  Insurance: Payor: [employer] / Plan: AET[employer] MEDICARE-ADVANTAGE PPO / Product Type: Medicare /      Patient  verified Yes     Visit #   Current / Total 17 22   Time   In / Out 11:20 12:00   Pain   In / Out 0 0   Subjective Functional Status/Changes: Pt reports feeling good this week.      Next PN/ RC due 3/3/25, RC 3/3/2025  Auth due MAURICIO, med nec    TREATMENT AREA =  Other low back pain [M54.59]    OBJECTIVE    Therapeutic Procedures:    Tx Min Billable or 1:1 Min (if diff from Tx Min) Procedure, Rationale, Specifics   10  61293 Therapeutic Exercise (timed):  increase ROM, strength, coordination, balance, and proprioception to improve patient's ability to progress to PLOF and address remaining functional goals. (see flow sheet as applicable)     Details if applicable:       112 Neuromuscular Re-Education (timed):  improve balance, coordination, kinesthetic sense, posture, core stability and proprioception to improve patient's ability to develop conscious control of individual muscles and awareness of position of extremities in order to progress to PLOF and address remaining functional goals. (see flow sheet as applicable)     Details if applicable:     10  77360 Therapeutic Activity (timed):  use of dynamic activities replicating functional movements to increase ROM, strength, coordination, balance, and proprioception in order to improve patient's ability to progress to PLOF and address remaining functional goals.  (see flow sheet as applicable)      Cupping low back    40  [facility] Totals Reminder: bill using total billable min of TIMED therapeutic procedures (example: do not include dry needle or estim unattended, both untimed codes, in totals to left)  8-22 min = 1 unit; 23-37 min = 2 units; 38-52 min = 3 units; 53-67 min = 4 units; 68-82 min = 5 units

## 2025-03-05 ENCOUNTER — HOSPITAL ENCOUNTER (OUTPATIENT)
Facility: HOSPITAL | Age: 65
Setting detail: RECURRING SERIES
Discharge: HOME | End: 2025-03-08
Payer: MEDICARE

## 2025-03-05 PROCEDURE — 97112 NEUROMUSCULAR REEDUCATION: CPT

## 2025-03-05 PROCEDURE — 97530 THERAPEUTIC ACTIVITIES: CPT

## 2025-03-05 PROCEDURE — 97110 THERAPEUTIC EXERCISES: CPT

## 2025-03-05 NOTE — PROGRESS NOTES
[facility]  [address] [place] [address]  Phone: [phone] Fax [phone]  DISCHARGE SUMMARY  Patient Name: Amie Griffin : 1960   Treatment/Medical Diagnosis: Other low back pain [M54.59]   Referral Source: Chance Bright MD     Date of Initial Visit: 10/02/2024 Attended Visits: 18 Missed Visits: 0     SUMMARY OF TREATMENT  Patient's POC has consisted of therex, therapeutic activities, manual therapy prn, modalities prn, pt. education, and a comprehensive HEP. Treatment strategies used to address functional mobility deficits, ROM deficits, strength deficits, analyze and address soft tissue restrictions, analyze and cue movement patterns, analyze and modify body mechanics/ergonomics, assess and modify postural abnormalities and instruct in home and community integration.      CURRENT STATUS  Patient will be able to walk 1/2 mile at a faster, self selected pace without little to no low back pain.         Last PN: able to walk 1/2 mile at a slow pace, only 1/4 mile at faster, self selected pace.   Current: 1/2 mile at self selected pace GOAL MET  2.   Patient will improve bilateral hip abduction to at least 4/5 in order to walk with less difficulty        Last PN: 4-/5 Suman  Current: 4+/5 RLE, 5/5 LLE GOAL MET  3.   Patient will be independent with his/her home exercise program (HEP) to maintain gains of therapy.        Current: pt is independent with HEP and ready for D/C GOAL MET    RECOMMENDATIONS  Pt has met all goals and is ready to be D/C at this time    If you have any questions/comments please contact us directly at [phone].   Thank you for allowing us to assist in the care of your patient.  PTA signature  Marisol Fairbanks PT     Therapist Signature: Marisol Fairbanks PT Date: 2025   Reporting Period: 2025 - 2025 Time: 12:54 PM

## 2025-03-05 NOTE — PROGRESS NOTES
PHYSICAL / OCCUPATIONAL THERAPY - DAILY TREATMENT NOTE (updated )    Patient Name: Amie Griffin    Date: 3/5/2025    : 1960  Insurance: Payor: [employer] / Plan: AETNA MEDICARE-ADVANTAGE PPO / Product Type: Medicare /      Patient  verified Yes     Visit #   Current / Total 18 22   Time   In / Out 11:20 12:00   Pain   In / Out 0 0   Subjective Functional Status/Changes: Pt reports walking at her normal pace and feeling good     Next PN/ RC due 3/3/25, RC 3/3/2025  Auth due MAURICIO, med nec    TREATMENT AREA =  Other low back pain [M54.59]    OBJECTIVE    Therapeutic Procedures:    Tx Min Billable or 1:1 Min (if diff from Tx Min) Procedure, Rationale, Specifics   10  18856 Therapeutic Exercise (timed):  increase ROM, strength, coordination, balance, and proprioception to improve patient's ability to progress to PLOF and address remaining functional goals. (see flow sheet as applicable)     Details if applicable:       15  90019 Neuromuscular Re-Education (timed):  improve balance, coordination, kinesthetic sense, posture, core stability and proprioception to improve patient's ability to develop conscious control of individual muscles and awareness of position of extremities in order to progress to PLOF and address remaining functional goals. (see flow sheet as applicable)     Details if applicable:     15  46308 Therapeutic Activity (timed):  use of dynamic activities replicating functional movements to increase ROM, strength, coordination, balance, and proprioception in order to improve patient's ability to progress to PLOF and address remaining functional goals.  (see flow sheet as applicable)      Testing for note of progress   40  [facility] Totals Reminder: bill using total billable min of TIMED therapeutic procedures (example: do not include dry needle or estim unattended, both untimed codes, in totals to left)  8-22 min = 1 unit; 23-37 min = 2 units; 38-52 min = 3 units; 53-67 min = 4  [FreeTextEntry1] : reflux laryngitis- continue diet mech omep for now. She mentioned she is losing weight - I recommended she go back to usual diet and discuss with her internist asap - she agreed\par \par atrophic rhinitis - continue atrovent\par \par pulm- continue followup for her cough \par \par rtc 3 weeks